# Patient Record
Sex: MALE | Race: WHITE | NOT HISPANIC OR LATINO | Employment: OTHER | URBAN - METROPOLITAN AREA
[De-identification: names, ages, dates, MRNs, and addresses within clinical notes are randomized per-mention and may not be internally consistent; named-entity substitution may affect disease eponyms.]

---

## 2017-11-21 ENCOUNTER — OFFICE VISIT (OUTPATIENT)
Dept: ORTHOPEDIC SURGERY | Facility: CLINIC | Age: 77
End: 2017-11-21

## 2017-11-21 VITALS — WEIGHT: 222 LBS | BODY MASS INDEX: 32.88 KG/M2 | HEIGHT: 69 IN | TEMPERATURE: 97.6 F

## 2017-11-21 DIAGNOSIS — M54.50 LUMBAR SPINE PAIN: Primary | ICD-10-CM

## 2017-11-21 DIAGNOSIS — M48.061 SPINAL STENOSIS OF LUMBAR REGION, UNSPECIFIED WHETHER NEUROGENIC CLAUDICATION PRESENT: ICD-10-CM

## 2017-11-21 DIAGNOSIS — M41.9 ACQUIRED SCOLIOSIS: ICD-10-CM

## 2017-11-21 PROCEDURE — 72100 X-RAY EXAM L-S SPINE 2/3 VWS: CPT | Performed by: ORTHOPAEDIC SURGERY

## 2017-11-21 PROCEDURE — 99204 OFFICE O/P NEW MOD 45 MIN: CPT | Performed by: ORTHOPAEDIC SURGERY

## 2017-11-21 RX ORDER — DUTASTERIDE 0.5 MG/1
0.5 CAPSULE, LIQUID FILLED ORAL DAILY
COMMUNITY
End: 2021-04-09

## 2017-11-21 RX ORDER — IBUPROFEN 200 MG
200 TABLET ORAL 2 TIMES DAILY
COMMUNITY
End: 2018-08-22

## 2017-11-21 RX ORDER — MULTIVIT-MIN/IRON FUM/FOLIC AC 7.5 MG-4
1 TABLET ORAL DAILY
COMMUNITY
End: 2020-09-22 | Stop reason: SDUPTHER

## 2017-11-21 RX ORDER — GABAPENTIN 300 MG/1
CAPSULE ORAL
COMMUNITY
Start: 2017-10-19 | End: 2019-08-06

## 2017-11-21 RX ORDER — LISINOPRIL 5 MG/1
5 TABLET ORAL
COMMUNITY
Start: 2017-02-16 | End: 2018-02-16

## 2017-11-21 RX ORDER — TAMSULOSIN HYDROCHLORIDE 0.4 MG/1
1 CAPSULE ORAL NIGHTLY
COMMUNITY
End: 2020-09-22 | Stop reason: SDUPTHER

## 2017-11-21 NOTE — PROGRESS NOTES
New patient or new problem visit    Chief Complaint   Patient presents with   • Lumbar Spine - Pain       HPI: He complains of chronic low back pain but more recent history of leg pain left more so than right which radiates to the feet.  The leg pain is worse than the back pain.  Over time the back pain is improved with the distraction therapy at the chiropractors, chiropractic itself home stretching devices a of various sorts and the most recent regimen of Neurontin and Aleve which have helped for the past 5 days to relieve his moderate intermittent but constantly present the pain which is burning in nature.    PFSH: See chart- reviewed    Review of Systems   Constitutional: Negative for chills, fever and unexpected weight change.   HENT: Positive for hearing loss and tinnitus. Negative for trouble swallowing and voice change.    Eyes: Negative for visual disturbance.   Respiratory: Negative for cough and shortness of breath.    Cardiovascular: Negative for chest pain and leg swelling.   Gastrointestinal: Negative for abdominal pain, nausea and vomiting.   Endocrine: Negative for cold intolerance and heat intolerance.   Genitourinary: Negative for difficulty urinating, frequency and urgency.   Skin: Negative for rash and wound.   Allergic/Immunologic: Negative for immunocompromised state.   Neurological: Positive for numbness (anita feet, left worse). Negative for weakness.   Hematological: Does not bruise/bleed easily.   Psychiatric/Behavioral: Negative for dysphoric mood. The patient is not nervous/anxious.        PE: Constitutional: Vital signs above-noted.  Awake, alert and oriented    Psychiatric: Affect and insight do not appear grossly disturbed.    Pulmonary: Breathing is unlabored, color is good.    Skin: Warm, dry and normal turgor    Cardiac:  pedal pulses intact.  No edema.    Eyesight and hearing appear adequate for examination purposes      Musculoskeletal:  There is some tenderness to percussion and  palpation of the spine. Motion appears undisturbed.  Posture is unremarkable to coronal and sagittal inspection.    The skin about the area is intact.  There is no palpable or visible deformity.  There is no local spasm.       Neurologic:   Reflexes are 2+ and symmetrical in the patellae and achilles.   Motor function is undisturbed in quadriceps, EHL, and gastrocnemius      Sensation appears symmetrically intact to light touch though he notes some numbness in the lateral aspect of the left leg.  In the bilateral lower extremities there is no evidence of atrophy.   Clonus is absent..  Gait appears undisturbed.  SLR test negative      MEDICAL DECISION MAKING    XRAY: Plain film x-rays of the lumbar spine show scoliosis and multilevel disc degeneration and some loss of lordosis.  No comparison views are available.    Other: n/a    Impression: Worsening back and leg pain which I believe is lumbar spinal stenosis, scoliosis and disc degeneration    Plan: I plan MRI scan with an eye toward lumbar epidural steroid injections.  The risk of the injections were explained.

## 2017-12-05 ENCOUNTER — HOSPITAL ENCOUNTER (OUTPATIENT)
Dept: MRI IMAGING | Facility: HOSPITAL | Age: 77
Discharge: HOME | End: 2017-12-05
Attending: ORTHOPAEDIC SURGERY | Admitting: ORTHOPAEDIC SURGERY

## 2017-12-05 DIAGNOSIS — M48.061 SPINAL STENOSIS OF LUMBAR REGION, UNSPECIFIED WHETHER NEUROGENIC CLAUDICATION PRESENT: ICD-10-CM

## 2017-12-05 DIAGNOSIS — M54.50 LUMBAR SPINE PAIN: ICD-10-CM

## 2017-12-05 PROCEDURE — 72148 MRI LUMBAR SPINE W/O DYE: CPT

## 2017-12-08 ENCOUNTER — TELEPHONE (OUTPATIENT)
Dept: ORTHOPEDIC SURGERY | Facility: CLINIC | Age: 77
End: 2017-12-08

## 2017-12-08 NOTE — TELEPHONE ENCOUNTER
----- Message from Alex Little MD sent at 12/7/2017  5:07 PM EST -----      LEFT MESSAGE FOR PT TO CALL BACK FOR MRI RESULTS MG    Tell him that the lumbar MRI shows moderate nerve compression, and that he is an excellent candidate for lumbar epidural steroid injections.  Go ahead and schedule these.

## 2017-12-08 NOTE — TELEPHONE ENCOUNTER
Patient has been informed of results and would like to proceed with injections. Please place order.cld

## 2017-12-11 DIAGNOSIS — M54.50 LUMBAR SPINE PAIN: Primary | ICD-10-CM

## 2017-12-26 ENCOUNTER — HOSPITAL ENCOUNTER (OUTPATIENT)
Dept: PAIN MEDICINE | Facility: HOSPITAL | Age: 77
Discharge: HOME | End: 2017-12-26
Attending: ORTHOPAEDIC SURGERY | Admitting: ORTHOPAEDIC SURGERY

## 2017-12-26 ENCOUNTER — ANESTHESIA (OUTPATIENT)
Dept: PAIN MEDICINE | Facility: HOSPITAL | Age: 77
End: 2017-12-26

## 2017-12-26 ENCOUNTER — TRANSCRIBE ORDERS (OUTPATIENT)
Dept: PAIN MEDICINE | Facility: HOSPITAL | Age: 77
End: 2017-12-26

## 2017-12-26 ENCOUNTER — HOSPITAL ENCOUNTER (OUTPATIENT)
Dept: GENERAL RADIOLOGY | Facility: HOSPITAL | Age: 77
Discharge: HOME | End: 2017-12-26

## 2017-12-26 ENCOUNTER — ANESTHESIA EVENT (OUTPATIENT)
Dept: PAIN MEDICINE | Facility: HOSPITAL | Age: 77
End: 2017-12-26

## 2017-12-26 VITALS
HEIGHT: 70 IN | OXYGEN SATURATION: 96 % | TEMPERATURE: 98.1 F | DIASTOLIC BLOOD PRESSURE: 77 MMHG | BODY MASS INDEX: 29.92 KG/M2 | SYSTOLIC BLOOD PRESSURE: 120 MMHG | RESPIRATION RATE: 16 BRPM | HEART RATE: 63 BPM | WEIGHT: 209 LBS

## 2017-12-26 DIAGNOSIS — M54.50 LUMBAR SPINE PAIN: Primary | ICD-10-CM

## 2017-12-26 DIAGNOSIS — M54.50 LUMBAR SPINE PAIN: ICD-10-CM

## 2017-12-26 DIAGNOSIS — R52 PAIN: ICD-10-CM

## 2017-12-26 PROBLEM — Z12.11 SCREEN FOR COLON CANCER: Status: ACTIVE | Noted: 2017-11-06

## 2017-12-26 PROBLEM — G62.9 NEUROPATHY: Status: ACTIVE | Noted: 2017-04-10

## 2017-12-26 PROBLEM — I10 HYPERTENSION: Status: ACTIVE | Noted: 2017-10-09

## 2017-12-26 PROBLEM — K21.9 GERD (GASTROESOPHAGEAL REFLUX DISEASE): Status: ACTIVE | Noted: 2017-12-26

## 2017-12-26 PROBLEM — I45.10 RIGHT BUNDLE BRANCH BLOCK (RBBB): Status: ACTIVE | Noted: 2017-12-26

## 2017-12-26 PROBLEM — N40.0 BPH (BENIGN PROSTATIC HYPERPLASIA): Status: ACTIVE | Noted: 2017-02-16

## 2017-12-26 PROCEDURE — 77003 FLUOROGUIDE FOR SPINE INJECT: CPT

## 2017-12-26 PROCEDURE — C1755 CATHETER, INTRASPINAL: HCPCS

## 2017-12-26 PROCEDURE — 25010000002 METHYLPREDNISOLONE PER 80 MG: Performed by: ANESTHESIOLOGY

## 2017-12-26 PROCEDURE — 0 IOPAMIDOL 41 % SOLUTION: Performed by: ANESTHESIOLOGY

## 2017-12-26 RX ORDER — OMEPRAZOLE 20 MG/1
20 CAPSULE, DELAYED RELEASE ORAL DAILY
COMMUNITY
End: 2021-04-09

## 2017-12-26 RX ORDER — LIDOCAINE HYDROCHLORIDE 10 MG/ML
1 INJECTION, SOLUTION INFILTRATION; PERINEURAL ONCE AS NEEDED
Status: DISCONTINUED | OUTPATIENT
Start: 2017-12-26 | End: 2017-12-27 | Stop reason: HOSPADM

## 2017-12-26 RX ORDER — MIDAZOLAM HYDROCHLORIDE 1 MG/ML
1 INJECTION INTRAMUSCULAR; INTRAVENOUS AS NEEDED
Status: DISCONTINUED | OUTPATIENT
Start: 2017-12-26 | End: 2017-12-27 | Stop reason: HOSPADM

## 2017-12-26 RX ORDER — SODIUM CHLORIDE 0.9 % (FLUSH) 0.9 %
1-10 SYRINGE (ML) INJECTION AS NEEDED
Status: DISCONTINUED | OUTPATIENT
Start: 2017-12-26 | End: 2017-12-27 | Stop reason: HOSPADM

## 2017-12-26 RX ORDER — FENTANYL CITRATE 50 UG/ML
50 INJECTION, SOLUTION INTRAMUSCULAR; INTRAVENOUS AS NEEDED
Status: DISCONTINUED | OUTPATIENT
Start: 2017-12-26 | End: 2017-12-27 | Stop reason: HOSPADM

## 2017-12-26 RX ORDER — NAPROXEN SODIUM 220 MG
220 TABLET ORAL 2 TIMES DAILY PRN
COMMUNITY
End: 2019-08-06

## 2017-12-26 RX ORDER — METHYLPREDNISOLONE ACETATE 80 MG/ML
80 INJECTION, SUSPENSION INTRA-ARTICULAR; INTRALESIONAL; INTRAMUSCULAR; SOFT TISSUE ONCE
Status: COMPLETED | OUTPATIENT
Start: 2017-12-26 | End: 2017-12-26

## 2017-12-26 RX ADMIN — IOPAMIDOL 10 ML: 408 INJECTION, SOLUTION INTRATHECAL at 09:21

## 2017-12-26 RX ADMIN — METHYLPREDNISOLONE ACETATE 80 MG: 80 INJECTION, SUSPENSION INTRA-ARTICULAR; INTRALESIONAL; INTRAMUSCULAR; SOFT TISSUE at 09:21

## 2017-12-26 NOTE — H&P
"[facility]    History and Physical    Patient Name: CHRIS Mccoy Jr.  :  1940  MRN:  [id number]  Date of Admission: 2017    Subjective     Patient is a 77 y.o. male presents with chief complaint of chronic, moderate, severe leg: bilateral pain, numbness, and tingling.  Onset of symptoms was gradual starting over 5 years ago.  Symptoms are associated/aggravated by activity or exercise. Symptoms improve with pain medication    The following portions of the patients history were reviewed and updated as appropriate: current medications, allergies, past medical history, past surgical history, past family history, past social history and problem list                Objective     Past Medical History:   Past Medical History:   Diagnosis Date   • GERD (gastroesophageal reflux disease)    • Hypertension    • Low back pain      Past Surgical History:   Past Surgical History:   Procedure Laterality Date   • TONSILLECTOMY      age 7     Family History:   Family History   Problem Relation Age of Onset   • Hypertension Father    • Diabetes Sister      Social History:   Social History   Substance Use Topics   • Smoking status: Never Smoker   • Smokeless tobacco: Never Used   • Alcohol use Yes      Comment: 4-5 drinks per week       Vital Signs Range for the last 24 hours  Temperature: Temp:  [36.7 °C (98.1 °F)] 36.7 °C (98.1 °F)   Temp Source: Temp src: Oral   BP: BP: (137)/(95) 137/95   Pulse: Heart Rate:  [65] 65   Respirations: Resp:  [16] 16   SPO2: SpO2:  [98 %] 98 %   O2 Amount (l/min):     O2 Devices O2 Device: room air   Weight: Weight:  [94.8 kg (209 lb)] 94.8 kg (209 lb)     Flowsheet Rows         First Filed Value    Admission Height  177.8 cm (70\") Documented at 2017 0857    Admission Weight  94.8 kg (209 lb) Documented at 2017 0857          --------------------------------------------------------------------------------    Current Outpatient Prescriptions   Medication Sig Dispense " Refill   • dutasteride (AVODART) 0.5 MG capsule Take  by mouth.     • gabapentin (NEURONTIN) 300 MG capsule TAKE ONE CAPSULE BY MOUTH TWICE A DAY     • lisinopril (PRINIVIL,ZESTRIL) 5 MG tablet Take 5 mg by mouth.     • Multiple Vitamins-Minerals (MULTIVITAMIN WITH MINERALS) tablet Take 1 tablet by mouth.     • naproxen sodium (ALEVE) 220 MG tablet Take 220 mg by mouth 2 (Two) Times a Day As Needed.     • omeprazole (priLOSEC) 20 MG capsule Take 20 mg by mouth Daily.     • tamsulosin (FLOMAX) 0.4 MG capsule 24 hr capsule Take 0.4 mg by mouth.     • ibuprofen (ADVIL,MOTRIN) 200 MG tablet Take 200 mg by mouth 2 (Two) Times a Day.       Current Facility-Administered Medications   Medication Dose Route Frequency Provider Last Rate Last Dose   • fentaNYL citrate (PF) (SUBLIMAZE) injection 50 mcg  50 mcg Intravenous PRN Damien Covarrubias MD       • iopamidol (ISOVUE-M 200) injection 41%  12 mL Epidural Once PRN Damien Covarrubias MD       • lidocaine (XYLOCAINE) 1 % injection 1 mL  1 mL Intradermal Once PRN Damien Covarrubias MD       • methylPREDNISolone acetate (DEPO-medrol) injection 80 mg  80 mg Intra-articular Once Damien Covarrubias MD       • midazolam (VERSED) injection 1 mg  1 mg Intravenous PRN Damien Covarrubias MD       • sodium chloride 0.9 % flush 1-10 mL  1-10 mL Intravenous PRN Damien Covarrubias MD           --------------------------------------------------------------------------------  Assessment/Plan      Anesthesia Evaluation     Patient summary reviewed and Nursing notes reviewed   no history of anesthetic complications:         Airway   Mallampati: II  TM distance: >3 FB  Neck ROM: full  no difficulty expected  Dental - normal exam     Pulmonary - negative pulmonary ROS and normal exam   (-) shortness of breath, sleep apnea, rhonchi, decreased breath sounds  Cardiovascular - normal exam  Exercise tolerance: good (4-7 METS)    Rhythm: regular  Rate: normal    (+) hypertension,  dysrhythmias (RBBB),   (-) past MI, angina, CHF, orthopnea, PND, LUND, PVD      Neuro/Psych  (+) numbness (LLE>RLE),   Strength normal in all four extremities,   left straight leg raise test,  Sensory Deficit,    normal reflexes and symmetric  (-) seizures, neuromuscular disease, TIA, CVA, dizziness/light headedness, weakness, normal coordination, normal gait, right straight leg raise test  GI/Hepatic/Renal/Endo    (+)  GERD,   (-) liver disease, diabetes    Musculoskeletal (-) normal exam    (+) back pain, radiculopathy Left lower extremity and Right lower extremity  (-)  DEAN test  Abdominal  - normal exam   Substance History - negative use  (-) alcohol use, drug use     OB/GYN negative ob/gyn ROS         Other - negative ROS                                          Diagnosis and Plan    Treatment Plan  ASA 3      Procedures: Lumbar Epidural Steroid Injection(LESI), With fluoroscopy,       Anesthetic plan and risks discussed with patient.        MRI - reviewed    Diagnosis     * Lumbar degenerative disc disease [M51.36]     * Lumbar radiculopathy [M54.16]     PLAN:  1.  Lumbar epidural steroid injections, up to 3, spaced 1-2 weeks apart.  If pain control is acceptable after 1 or 2 injections, it was discussed with the patient that they may return for the subsequent injections if and when their pain returns.  The risks were discussed with the patient including failure of relief, worsening pain, Headache (post dural puncture headache), bleeding (epidural hematoma) and infection (epidural abscess or skin infection).  2.  Physical therapy exercises at home as prescribed by physical therapy or from the pain clinic handout (given to the patient).  Continuation of these exercises every day, or multiple times per week, even when the patient has good pain relief, was stressed to the patient as a preventative measure to decrease the frequency and severity of future pain episodes.  3.  Continue pain medicines as already  prescribed.  If patient not currently taking any, it is recommended to begin Acetaminophen 1000 mg po q 8 hours.  If other medicines containing Acetaminophen are currently prescribed, maintain daily dose at 3000 mg.    4.  If they can tolerate NSAIDS, it is recommended to take Ibuprofen 600 mg po q 6 hours for 7 days during pain exacerbations.  Alternatively, they may substitute an NSAID of their choice (e.g. Aleve).  This may be taken at the same time as Acetaminophen.  5.  Heat and ice to the affected area as tolerated for pain control.  It was discussed that heating pads can cause burns.  6.  Daily low impact exercise such as walking or water exercise was recommended to maintain overall health and aid in weight control.   7.  Follow up as needed for subsequent injections.  8.  Patient was counseled to abstain from tobacco products.

## 2017-12-26 NOTE — PLAN OF CARE
Problem: Pain, Chronic (Adult)  Goal: Acceptable Pain Control/Comfort Level  Outcome: Ongoing (interventions implemented as appropriate)

## 2017-12-26 NOTE — PLAN OF CARE
Problem: Pain, Chronic (Adult)  Goal: Identify Related Risk Factors and Signs and Symptoms  Outcome: Ongoing (interventions implemented as appropriate)

## 2017-12-26 NOTE — ANESTHESIA PROCEDURE NOTES
PAIN Epidural block    Patient location during procedure: pain clinic  Indication:procedure for pain  Performed By  Anesthesiologist: BOAZ PEREZ  Preanesthetic Checklist  Completed: patient identified, surgical consent, pre-op evaluation, timeout performed, IV checked, risks and benefits discussed and monitors and equipment checked  Additional Notes  Diagnosis:  Post-Op Diagnosis Codes:     * Lumbar degenerative disc disease (M51.36)     * Lumbar radiculopathy (M54.16)      Prep:  Pt Position:prone  Sterile Tech:gloves, mask and sterile barrier  Prep:chlorhexidine gluconate and isopropyl alcohol  Monitoring:blood pressure monitoring, continuous pulse oximetry and EKG  Procedure:  Sedation: no   Approach:left paramedian  Guidance: fluoroscopy  Location:lumbar  Interspace: L5-S1.  Needle Type:Tuohy  Needle Gauge:20  Aspiration:negative  Test Dose:negative  Medications:  Depomedrol:80 mg  Preservative Free Saline:3mL  Isovue:3mL    Post Assessment:  Dressing:occlusive dressing applied  Pt Tolerance:patient tolerated the procedure well with no apparent complications  Complications:no

## 2018-01-23 ENCOUNTER — HOSPITAL ENCOUNTER (OUTPATIENT)
Dept: PAIN MEDICINE | Facility: HOSPITAL | Age: 78
Discharge: HOME | End: 2018-01-23
Admitting: ORTHOPAEDIC SURGERY

## 2018-01-23 ENCOUNTER — TRANSCRIBE ORDERS (OUTPATIENT)
Dept: PAIN MEDICINE | Facility: HOSPITAL | Age: 78
End: 2018-01-23

## 2018-01-23 ENCOUNTER — ANESTHESIA EVENT (OUTPATIENT)
Dept: PAIN MEDICINE | Facility: HOSPITAL | Age: 78
End: 2018-01-23

## 2018-01-23 ENCOUNTER — HOSPITAL ENCOUNTER (OUTPATIENT)
Dept: GENERAL RADIOLOGY | Facility: HOSPITAL | Age: 78
Discharge: HOME | End: 2018-01-23

## 2018-01-23 ENCOUNTER — ANESTHESIA (OUTPATIENT)
Dept: PAIN MEDICINE | Facility: HOSPITAL | Age: 78
End: 2018-01-23

## 2018-01-23 VITALS
TEMPERATURE: 98.1 F | DIASTOLIC BLOOD PRESSURE: 83 MMHG | WEIGHT: 207 LBS | RESPIRATION RATE: 16 BRPM | BODY MASS INDEX: 29.63 KG/M2 | HEIGHT: 70 IN | HEART RATE: 68 BPM | SYSTOLIC BLOOD PRESSURE: 126 MMHG | OXYGEN SATURATION: 97 %

## 2018-01-23 DIAGNOSIS — M54.50 LUMBAR SPINE PAIN: Primary | ICD-10-CM

## 2018-01-23 DIAGNOSIS — R52 PAIN: ICD-10-CM

## 2018-01-23 DIAGNOSIS — M54.50 LUMBAR SPINE PAIN: ICD-10-CM

## 2018-01-23 PROCEDURE — 0 IOPAMIDOL 41 % SOLUTION: Performed by: ANESTHESIOLOGY

## 2018-01-23 PROCEDURE — 77003 FLUOROGUIDE FOR SPINE INJECT: CPT

## 2018-01-23 PROCEDURE — 25010000002 METHYLPREDNISOLONE PER 80 MG: Performed by: ANESTHESIOLOGY

## 2018-01-23 PROCEDURE — C1755 CATHETER, INTRASPINAL: HCPCS

## 2018-01-23 RX ORDER — METHYLPREDNISOLONE ACETATE 80 MG/ML
80 INJECTION, SUSPENSION INTRA-ARTICULAR; INTRALESIONAL; INTRAMUSCULAR; SOFT TISSUE ONCE
Status: COMPLETED | OUTPATIENT
Start: 2018-01-23 | End: 2018-01-23

## 2018-01-23 RX ADMIN — IOPAMIDOL 3 ML: 408 INJECTION, SOLUTION INTRATHECAL at 08:55

## 2018-01-23 RX ADMIN — METHYLPREDNISOLONE ACETATE 80 MG: 80 INJECTION, SUSPENSION INTRA-ARTICULAR; INTRALESIONAL; INTRAMUSCULAR; SOFT TISSUE at 08:55

## 2018-01-23 NOTE — INTERVAL H&P NOTE
[facility]  H&P reviewed. No changes since last visit.  Patient states   25-50% improvement since the last procedure/injection.    Diagnosis     * Degeneration of lumbar intervertebral disc [M51.36]     * Lumbar radiculopathy [M54.16]      Airway assessed since last visit. Airway class equals: 2.  He is being treated primarily for left leg pain.  His pain level today as a 4/10.  He is here for lumbar epidural steroid injection #2.

## 2018-01-23 NOTE — H&P (VIEW-ONLY)
"[facility]    History and Physical    Patient Name: CHRIS Mccoy Jr.  :  1940  MRN:  [id number]  Date of Admission: 2017    Subjective     Patient is a 77 y.o. male presents with chief complaint of chronic, moderate, severe leg: bilateral pain, numbness, and tingling.  Onset of symptoms was gradual starting over 5 years ago.  Symptoms are associated/aggravated by activity or exercise. Symptoms improve with pain medication    The following portions of the patients history were reviewed and updated as appropriate: current medications, allergies, past medical history, past surgical history, past family history, past social history and problem list                Objective     Past Medical History:   Past Medical History:   Diagnosis Date   • GERD (gastroesophageal reflux disease)    • Hypertension    • Low back pain      Past Surgical History:   Past Surgical History:   Procedure Laterality Date   • TONSILLECTOMY      age 7     Family History:   Family History   Problem Relation Age of Onset   • Hypertension Father    • Diabetes Sister      Social History:   Social History   Substance Use Topics   • Smoking status: Never Smoker   • Smokeless tobacco: Never Used   • Alcohol use Yes      Comment: 4-5 drinks per week       Vital Signs Range for the last 24 hours  Temperature: Temp:  [36.7 °C (98.1 °F)] 36.7 °C (98.1 °F)   Temp Source: Temp src: Oral   BP: BP: (137)/(95) 137/95   Pulse: Heart Rate:  [65] 65   Respirations: Resp:  [16] 16   SPO2: SpO2:  [98 %] 98 %   O2 Amount (l/min):     O2 Devices O2 Device: room air   Weight: Weight:  [94.8 kg (209 lb)] 94.8 kg (209 lb)     Flowsheet Rows         First Filed Value    Admission Height  177.8 cm (70\") Documented at 2017 0857    Admission Weight  94.8 kg (209 lb) Documented at 2017 0857          --------------------------------------------------------------------------------    Current Outpatient Prescriptions   Medication Sig Dispense " Refill   • dutasteride (AVODART) 0.5 MG capsule Take  by mouth.     • gabapentin (NEURONTIN) 300 MG capsule TAKE ONE CAPSULE BY MOUTH TWICE A DAY     • lisinopril (PRINIVIL,ZESTRIL) 5 MG tablet Take 5 mg by mouth.     • Multiple Vitamins-Minerals (MULTIVITAMIN WITH MINERALS) tablet Take 1 tablet by mouth.     • naproxen sodium (ALEVE) 220 MG tablet Take 220 mg by mouth 2 (Two) Times a Day As Needed.     • omeprazole (priLOSEC) 20 MG capsule Take 20 mg by mouth Daily.     • tamsulosin (FLOMAX) 0.4 MG capsule 24 hr capsule Take 0.4 mg by mouth.     • ibuprofen (ADVIL,MOTRIN) 200 MG tablet Take 200 mg by mouth 2 (Two) Times a Day.       Current Facility-Administered Medications   Medication Dose Route Frequency Provider Last Rate Last Dose   • fentaNYL citrate (PF) (SUBLIMAZE) injection 50 mcg  50 mcg Intravenous PRN Damien Covarrubias MD       • iopamidol (ISOVUE-M 200) injection 41%  12 mL Epidural Once PRN Damien Covarrubias MD       • lidocaine (XYLOCAINE) 1 % injection 1 mL  1 mL Intradermal Once PRN Damien Covarrubias MD       • methylPREDNISolone acetate (DEPO-medrol) injection 80 mg  80 mg Intra-articular Once Damien Covarrubias MD       • midazolam (VERSED) injection 1 mg  1 mg Intravenous PRN Damien Covarrubias MD       • sodium chloride 0.9 % flush 1-10 mL  1-10 mL Intravenous PRN Damien Covarrubias MD           --------------------------------------------------------------------------------  Assessment/Plan      Anesthesia Evaluation     Patient summary reviewed and Nursing notes reviewed   no history of anesthetic complications:         Airway   Mallampati: II  TM distance: >3 FB  Neck ROM: full  no difficulty expected  Dental - normal exam     Pulmonary - negative pulmonary ROS and normal exam   (-) shortness of breath, sleep apnea, rhonchi, decreased breath sounds  Cardiovascular - normal exam  Exercise tolerance: good (4-7 METS)    Rhythm: regular  Rate: normal    (+) hypertension,  dysrhythmias (RBBB),   (-) past MI, angina, CHF, orthopnea, PND, LUND, PVD      Neuro/Psych  (+) numbness (LLE>RLE),   Strength normal in all four extremities,   left straight leg raise test,  Sensory Deficit,    normal reflexes and symmetric  (-) seizures, neuromuscular disease, TIA, CVA, dizziness/light headedness, weakness, normal coordination, normal gait, right straight leg raise test  GI/Hepatic/Renal/Endo    (+)  GERD,   (-) liver disease, diabetes    Musculoskeletal (-) normal exam    (+) back pain, radiculopathy Left lower extremity and Right lower extremity  (-)  DEAN test  Abdominal  - normal exam   Substance History - negative use  (-) alcohol use, drug use     OB/GYN negative ob/gyn ROS         Other - negative ROS                                          Diagnosis and Plan    Treatment Plan  ASA 3      Procedures: Lumbar Epidural Steroid Injection(LESI), With fluoroscopy,       Anesthetic plan and risks discussed with patient.        MRI - reviewed    Diagnosis     * Lumbar degenerative disc disease [M51.36]     * Lumbar radiculopathy [M54.16]     PLAN:  1.  Lumbar epidural steroid injections, up to 3, spaced 1-2 weeks apart.  If pain control is acceptable after 1 or 2 injections, it was discussed with the patient that they may return for the subsequent injections if and when their pain returns.  The risks were discussed with the patient including failure of relief, worsening pain, Headache (post dural puncture headache), bleeding (epidural hematoma) and infection (epidural abscess or skin infection).  2.  Physical therapy exercises at home as prescribed by physical therapy or from the pain clinic handout (given to the patient).  Continuation of these exercises every day, or multiple times per week, even when the patient has good pain relief, was stressed to the patient as a preventative measure to decrease the frequency and severity of future pain episodes.  3.  Continue pain medicines as already  prescribed.  If patient not currently taking any, it is recommended to begin Acetaminophen 1000 mg po q 8 hours.  If other medicines containing Acetaminophen are currently prescribed, maintain daily dose at 3000 mg.    4.  If they can tolerate NSAIDS, it is recommended to take Ibuprofen 600 mg po q 6 hours for 7 days during pain exacerbations.  Alternatively, they may substitute an NSAID of their choice (e.g. Aleve).  This may be taken at the same time as Acetaminophen.  5.  Heat and ice to the affected area as tolerated for pain control.  It was discussed that heating pads can cause burns.  6.  Daily low impact exercise such as walking or water exercise was recommended to maintain overall health and aid in weight control.   7.  Follow up as needed for subsequent injections.  8.  Patient was counseled to abstain from tobacco products.

## 2018-01-23 NOTE — ANESTHESIA PROCEDURE NOTES
PAIN Epidural block    Patient location during procedure: pain clinic  Start Time: 1/23/2018 8:46 AM  Stop Time: 1/23/2018 8:58 AM  Indication:procedure for pain  Performed By  Anesthesiologist: NICOLE TADEO  Preanesthetic Checklist  Completed: patient identified, site marked, surgical consent, pre-op evaluation, timeout performed, risks and benefits discussed and monitors and equipment checked  Additional Notes  Interlaminar epidural was performed under fluoroscopic guidance.    Diagnosis     * Degeneration of lumbar intervertebral disc (M51.36)     * Lumbar radiculopathy (M54.16)  Prep:  Pt Position:prone  Sterile Tech:cap, gloves, mask and sterile barrier  Prep:chlorhexidine gluconate and isopropyl alcohol  Monitoring:blood pressure monitoring, continuous pulse oximetry and EKG  Procedure:  Sedation: no   Approach:left paramedian  Guidance: fluoroscopy  Location:lumbar  Interspace: L5-S1.  Needle Type:Tuohy  Needle Gauge:20 G  Aspiration:negative  Medications:  Depomedrol:80 mg  Preservative Free Saline:3mL  Isovue:2mL  Comments:Epidural spread of contrast.  Post Assessment:  Dressing:occlusive dressing applied  Pt Tolerance:patient tolerated the procedure well with no apparent complications  Complications:no

## 2018-03-13 ENCOUNTER — HOSPITAL ENCOUNTER (OUTPATIENT)
Dept: GENERAL RADIOLOGY | Facility: HOSPITAL | Age: 78
Discharge: HOME | End: 2018-03-13

## 2018-03-13 ENCOUNTER — ANESTHESIA EVENT (OUTPATIENT)
Dept: PAIN MEDICINE | Facility: HOSPITAL | Age: 78
End: 2018-03-13

## 2018-03-13 ENCOUNTER — ANESTHESIA (OUTPATIENT)
Dept: PAIN MEDICINE | Facility: HOSPITAL | Age: 78
End: 2018-03-13

## 2018-03-13 ENCOUNTER — HOSPITAL ENCOUNTER (OUTPATIENT)
Dept: PAIN MEDICINE | Facility: HOSPITAL | Age: 78
Discharge: HOME | End: 2018-03-13
Admitting: ORTHOPAEDIC SURGERY

## 2018-03-13 VITALS
SYSTOLIC BLOOD PRESSURE: 118 MMHG | DIASTOLIC BLOOD PRESSURE: 75 MMHG | RESPIRATION RATE: 16 BRPM | OXYGEN SATURATION: 96 % | HEART RATE: 72 BPM | TEMPERATURE: 97.5 F

## 2018-03-13 DIAGNOSIS — R52 PAIN: ICD-10-CM

## 2018-03-13 DIAGNOSIS — M54.50 LUMBAR SPINE PAIN: ICD-10-CM

## 2018-03-13 PROCEDURE — C1755 CATHETER, INTRASPINAL: HCPCS

## 2018-03-13 PROCEDURE — 25010000002 METHYLPREDNISOLONE PER 80 MG: Performed by: ANESTHESIOLOGY

## 2018-03-13 PROCEDURE — 0 IOPAMIDOL 41 % SOLUTION: Performed by: ANESTHESIOLOGY

## 2018-03-13 PROCEDURE — 77003 FLUOROGUIDE FOR SPINE INJECT: CPT

## 2018-03-13 RX ORDER — LISINOPRIL 5 MG/1
5 TABLET ORAL
COMMUNITY
Start: 2018-01-08 | End: 2019-01-08

## 2018-03-13 RX ORDER — METHYLPREDNISOLONE ACETATE 80 MG/ML
80 INJECTION, SUSPENSION INTRA-ARTICULAR; INTRALESIONAL; INTRAMUSCULAR; SOFT TISSUE ONCE
Status: COMPLETED | OUTPATIENT
Start: 2018-03-13 | End: 2018-03-13

## 2018-03-13 RX ADMIN — METHYLPREDNISOLONE ACETATE 80 MG: 80 INJECTION, SUSPENSION INTRA-ARTICULAR; INTRALESIONAL; INTRAMUSCULAR; SOFT TISSUE at 09:39

## 2018-03-13 RX ADMIN — IOPAMIDOL 10 ML: 408 INJECTION, SOLUTION INTRATHECAL at 09:39

## 2018-03-13 NOTE — ANESTHESIA PROCEDURE NOTES
PAIN Epidural block    Patient location during procedure: pain clinic  Start Time: 3/13/2018 9:27 AM  Stop Time: 3/13/2018 9:41 AM  Indication:procedure for pain  Performed By  Anesthesiologist: NICOLE TADEO  Preanesthetic Checklist  Completed: patient identified, site marked, surgical consent, pre-op evaluation, timeout performed, risks and benefits discussed and monitors and equipment checked  Additional Notes  Interlaminar epidural was performed under fluoroscopic guidance.    Diagnosis     * Degeneration of lumbar intervertebral disc (M51.36)     * Lumbar radiculopathy (M54.16)  Prep:  Pt Position:prone  Sterile Tech:cap, gloves, mask and sterile barrier  Prep:chlorhexidine gluconate and isopropyl alcohol  Monitoring:blood pressure monitoring, continuous pulse oximetry and EKG  Procedure:  Sedation: no   Approach:left paramedian  Guidance: fluoroscopy  Location:lumbar  Interspace: L5-S1.  Needle Type:Tuohy  Needle Gauge:20 G  Aspiration:negative  Medications:  Depomedrol:80 mg  Preservative Free Saline:3mL  Isovue:2mL  Comments:Epidural spread of contrast.  Post Assessment:  Dressing:occlusive dressing applied  Pt Tolerance:patient tolerated the procedure well with no apparent complications  Complications:no

## 2018-03-13 NOTE — H&P
[facility]    History and Physical    Patient Name: CHRIS Mccoy Jr.  :  1940  MRN:  [id number]  Date of Admission: 3/13/2018    Subjective     The patient is a 77-year-old male with pain in his lower back which radiates to his left greater than right lower extremity.  He reports approximate 75% relief following his last lumbar epidural steroid injection.  His pain level today is a 4/10.  He is here for lumbar epidural steroid injection #3.    The following portions of the patients history were reviewed and updated as appropriate: current medications, allergies, past medical history, past surgical history, past family history, past social history and problem list                Objective     Past Medical History:   Past Medical History:   Diagnosis Date   • GERD (gastroesophageal reflux disease)    • Hypertension    • Low back pain      Past Surgical History:   Past Surgical History:   Procedure Laterality Date   • TONSILLECTOMY      age 7     Family History:   Family History   Problem Relation Age of Onset   • Hypertension Father    • Diabetes Sister      Social History:   Social History   Substance Use Topics   • Smoking status: Never Smoker   • Smokeless tobacco: Never Used   • Alcohol use Yes      Comment: 4-5 drinks per week       Vital Signs Range for the last 24 hours  Temperature: Temp:  [36.4 °C (97.5 °F)] 36.4 °C (97.5 °F)   Temp Source: Temp src: Oral   BP: BP: (130)/(87) 130/87   Pulse: Heart Rate:  [76] 76   Respirations: Resp:  [16] 16   SPO2: SpO2:  [95 %] 95 %   O2 Amount (l/min):     O2 Devices Device (Oxygen Therapy): room air   Weight:           --------------------------------------------------------------------------------    Current Outpatient Prescriptions   Medication Sig Dispense Refill   • gabapentin (NEURONTIN) 300 MG capsule TAKE ONE CAPSULE BY MOUTH TWICE A DAY     • lisinopril (PRINIVIL,ZESTRIL) 5 MG tablet Take 5 mg by mouth.     • Multiple Vitamins-Minerals  (MULTIVITAMIN WITH MINERALS) tablet Take 1 tablet by mouth.     • naproxen sodium (ALEVE) 220 MG tablet Take 220 mg by mouth 2 (Two) Times a Day As Needed.     • omeprazole (priLOSEC) 20 MG capsule Take 20 mg by mouth Daily.     • tamsulosin (FLOMAX) 0.4 MG capsule 24 hr capsule Take 0.4 mg by mouth.     • dutasteride (AVODART) 0.5 MG capsule Take  by mouth.     • ibuprofen (ADVIL,MOTRIN) 200 MG tablet Take 200 mg by mouth 2 (Two) Times a Day.       No current facility-administered medications for this encounter.        --------------------------------------------------------------------------------  Assessment/Plan      Anesthesia Evaluation     Patient summary reviewed and Nursing notes reviewed   NPO Solid Status: > 8 hours  NPO Liquid Status: < 2 hours           Airway   Mallampati: II  TM distance: >3 FB  Neck ROM: full  Dental - normal exam     Pulmonary - negative pulmonary ROS and normal exam    breath sounds clear to auscultation  Cardiovascular - normal exam    Rhythm: regular  Rate: normal    (+) hypertension, dysrhythmias,   (-) angina, orthopnea, PND, LUND      Neuro/Psych- negative ROS  GI/Hepatic/Renal/Endo    (+)  GERD,      Musculoskeletal (-) negative ROS    Abdominal  - normal exam    Abdomen: soft.  Bowel sounds: normal.   Substance History - negative use     OB/GYN negative ob/gyn ROS         Other - negative ROS                  Diagnosis and Plan    Treatment Plan  ASA 3   Patient has had previous injection/procedure with 50-75% improvement.   Procedures: Lumbar Epidural Steroid Injection(LESI), With fluoroscopy,       Anesthetic plan and risks discussed with patient.          Diagnosis     * Degeneration of lumbar intervertebral disc [M51.36]     * Lumbar radiculopathy [M54.16]

## 2018-04-12 ENCOUNTER — TRANSCRIBE ORDERS (OUTPATIENT)
Dept: ADMINISTRATIVE | Facility: HOSPITAL | Age: 78
End: 2018-04-12

## 2018-04-12 DIAGNOSIS — R09.89 GLOBUS SENSATION: Primary | ICD-10-CM

## 2018-04-17 ENCOUNTER — APPOINTMENT (OUTPATIENT)
Dept: GENERAL RADIOLOGY | Facility: HOSPITAL | Age: 78
End: 2018-04-17
Attending: OTOLARYNGOLOGY

## 2018-04-18 ENCOUNTER — HOSPITAL ENCOUNTER (OUTPATIENT)
Dept: GENERAL RADIOLOGY | Facility: HOSPITAL | Age: 78
Discharge: HOME | End: 2018-04-18
Attending: OTOLARYNGOLOGY | Admitting: OTOLARYNGOLOGY

## 2018-04-18 DIAGNOSIS — R09.89 GLOBUS SENSATION: ICD-10-CM

## 2018-04-18 PROCEDURE — A9270 NON-COVERED ITEM OR SERVICE: HCPCS | Performed by: OTOLARYNGOLOGY

## 2018-04-18 PROCEDURE — 92611 MOTION FLUOROSCOPY/SWALLOW: CPT

## 2018-04-18 PROCEDURE — 63710000001 BARIUM SULFATE 40 % SUSPENSION: Performed by: OTOLARYNGOLOGY

## 2018-04-18 PROCEDURE — G8996 SWALLOW CURRENT STATUS: HCPCS

## 2018-04-18 PROCEDURE — 74230 X-RAY XM SWLNG FUNCJ C+: CPT

## 2018-04-18 PROCEDURE — 63710000001 BARIUM SULFATE 98 % RECONSTITUTED SUSPENSION: Performed by: OTOLARYNGOLOGY

## 2018-04-18 PROCEDURE — G8997 SWALLOW GOAL STATUS: HCPCS

## 2018-04-18 PROCEDURE — 63710000001 BARIUM SULFATE 96 % RECONSTITUTED SUSPENSION: Performed by: OTOLARYNGOLOGY

## 2018-04-18 PROCEDURE — G8998 SWALLOW D/C STATUS: HCPCS

## 2018-04-18 RX ADMIN — BARIUM SULFATE 4 ML: 980 POWDER, FOR SUSPENSION ORAL at 13:37

## 2018-04-18 RX ADMIN — BARIUM SULFATE 65 ML: 960 POWDER, FOR SUSPENSION ORAL at 13:37

## 2018-04-18 RX ADMIN — BARIUM SULFATE 50 ML: 400 SUSPENSION ORAL at 13:38

## 2018-04-18 NOTE — MBS/VFSS/FEES
"Outpatient Speech Language Pathology   Adult Swallow Initial Evaluation  [facility]     Patient Name: CHRIS Mccoy Jr.  : 1940  MRN: [id number]  Today's Date: 2018         Visit Date: 2018   Patient Active Problem List   Diagnosis   • BPH (benign prostatic hyperplasia)   • Hypertension   • Neuropathy   • Screen for colon cancer   • Right bundle branch block (RBBB)   • GERD (gastroesophageal reflux disease)   • Low back pain        Past Medical History:   Diagnosis Date   • GERD (gastroesophageal reflux disease)    • Hypertension    • Low back pain         Past Surgical History:   Procedure Laterality Date   • TONSILLECTOMY      age 7         Visit Dx:     ICD-10-CM ICD-9-CM   1. Globus sensation F45.8 306.4                 SLP Adult Swallow Evaluation - 18 1300        Rehab Evaluation    Document Type evaluation  -    Subjective Information complains of   R side globus sensation, but \"much of it has subsided\"  -    Patient Observations alert;cooperative  -    Patient Effort excellent  -    Symptoms Noted During/After Treatment none  -       General Information    Patient Profile Reviewed yes  -    Pertinent History Of Current Problem Globus sensation following virus. Pt reported \"much of it has subsided\". Pt noted his neck muscles have been tight and the globus sensation has been decreasing with stretching.  -    Current Method of Nutrition regular textures;thin liquids;other (see comments)   Pt denies dysphagia  -    Precautions/Limitations, Hearing hearing impairment, bilaterally  -    Prior Level of Function-Communication WFL  -    Prior Level of Function-Swallowing no diet consistency restrictions  -    Plans/Goals Discussed with patient  -    Barriers to Rehab hearing deficit  -    Patient's Goals for Discharge patient did not state  -       Pain Assessment    Additional Documentation Pain Scale: Numbers Pre/Post-Treatment (Group)  -       Pain Scale: " Numbers Pre/Post-Treatment    Pain Scale: Numbers, Pretreatment 0/10 - no pain  -SH    Pain Scale: Numbers, Post-Treatment 0/10 - no pain  -SH       Oral Musculature and Cranial Nerve Assessment    Oral Motor General Assessment WFL  -       MBS/VFSS Interpretation    Oral Prep Phase WFL  -SH    Oral Transit Phase WFL  -    Oral Residue WFL  -    VFSS Summary Pt presents with functional oropharyngeal swallow with mild esophageal dysphagia. Prominent cricopharyngeus and possible forming diverticulum allowing incomplete clearance of material past upper esophageal sphincter with backflow to pyriforms. Anterior view revealed stasis bilaterally. Slow esophageal clearance with puree. No penetration noted with thins, nectar, puree, or regular. Adequate bolus preparation and manipulation. Trace residue present post swallow with nectar at valleculae, pyriforms, and upper esophageal sphincter. Mild residue post swallow with thins at valleculae, pyriforms, and upper esophageal sphincter requiring cues to partially clear. Mild-moderate stasis post swallow with puree at valleculae, pyriforms, posterior pharyngeal wall and upper esophageal sphincter. Pt required cues to partially clear. Spillage to pyriforms before the swallow with mixed with transient, shallow posterior penetration.   -SH       Initiation of Pharyngeal Swallow    Initiation of Pharyngeal Swallow WFL  -SH       Esophageal Phase    Esophageal Phase esophageal retention  -SH       SLP Communication to Radiology    Summary Statement Pt presents with functional oropharyngeal swallow with mild esophageal dysphagia. Prominent cricopharyngeus and possible forming diverticulum allowing incomplete clearance of material past upper esophageal sphincter with backflow to pyriforms. Anterior view revealed stasis bilaterally. Slow esophageal clearance with puree. No penetration noted with thins, nectar, puree, or regular. Adequate bolus preparation and manipulation. Trace  residue present post swallow with nectar at valleculae, pyriforms, and upper esophageal sphincter. Mild residue post swallow with thins at valleculae, pyriforms, and upper esophageal sphincter requiring cues to partially clear. Mild-moderate stasis post swallow with puree at valleculae, pyriforms, posterior pharyngeal wall and upper esophageal sphincter. Pt required cues to partially clear. Spillage to pyriforms before the swallow with mixed with transient, shallow posterior penetration.   -       Clinical Impression    SLP Swallowing Diagnosis functional oral phase;functional pharyngeal phase;mild;esophageal dysfunction  -    Functional Impact no impact on function  -    Criteria for Skilled Therapeutic Interventions Met no problems identified which require skilled intervention  -       Recommendations    Therapy Frequency (Swallow) evaluation only  -    SLP Diet Recommendation regular textures;thin liquids  -    Recommended Precautions and Strategies multiple swallows per bite of food;multiple swallows per sip of liquid;upright posture during/after eating  -    SLP Rec. for Method of Medication Administration meds whole;with thin liquids;with pudding or applesauce  -    Monitor for Signs of Aspiration yes;notify SLP if any concerns;cough;elevated WBC count;gurgly voice;throat clearing;pneumonia;fever;upper respiratory;right lower lobe infiltrates  -      User Key  (r) = Recorded By, (t) = Taken By, (c) = Cosigned By    Initials Name Provider Type     Janet Cotton MS CCC-SLP Speech and Language Pathologist                              OP SLP Education     Row Name 04/18/18 8509       Education    Barriers to Learning Hearing deficit  -    Education Provided Described results of evaluation;Patient expressed understanding of evaluation  -    Assessed Learning needs;Learning motivation;Learning preferences;Learning readiness  -    Learning Motivation Strong  -    Learning Method  Explanation  -    Teaching Response Verbalized understanding  -    Education Comments Reviewed images of VFSS with patient  -      User Key  (r) = Recorded By, (t) = Taken By, (c) = Cosigned By    Initials Name Effective Dates     Janet Cotton MS CCC-SLP 03/07/18 -                     OP SLP Assessment/Plan - 04/18/18 1414        SLP Assessment    Clinical Impression: Swallowing WNL;esophageal dysphagia  -    Please refer to paper survey for additional self-reported information Yes  -    Please refer to items scanned into chart for additional diagnostic informaiton and handouts as provided by clinician Yes  -    Prognosis Good (comment)  -    Patient/caregiver participated in establishment of treatment plan and goals Yes  -    Patient would benefit from skilled therapy intervention Yes  -      User Key  (r) = Recorded By, (t) = Taken By, (c) = Cosigned By    Initials Name Provider Type     Janet Cotton MS CCC-SLP Speech and Language Pathologist              SLP Outcome Measures (last 72 hours)      SLP Outcome Measures     Row Name 04/18/18 1400             SLP Outcome Measures    Outcome Measure Used? Adult NOMS  -         FCM Scores    FCM Chosen Swallowing  -      Swallowing FCM Score 6  -        User Key  (r) = Recorded By, (t) = Taken By, (c) = Cosigned By    Initials Name Effective Dates     Janet Cotton MS CCC-SLP 03/07/18 -                Time Calculation:   SLP Start Time: 1300  SLP Stop Time: 1415  SLP Time Calculation (min): 75 min    Therapy Charges for Today     Code Description Service Date Service Provider Modifiers Qty    28013488202 HC ST SWALLOWING CURRENT STATUS 4/18/2018 Janet Cotton, MS CCC-SLP GN, CI 1    92249909265 HC ST SWALLOWING PROJECTED 4/18/2018 Janet Cotton, MS CCC-SLP GN, CI 1    70693375514 HC ST SWALLOWING DISCHARGE 4/18/2018 Janet Cotton, MS CCC-SLP GN, CI 1    46314340462 HC ST MOTION FLUORO EVAL SWALLOW 5 4/18/2018 Janet A MS Yury CCC-SLP GN 1           SLP G-Codes  Functional Limitations: Swallowing  Swallow Current Status (): At least 1 percent but less than 20 percent impaired, limited or restricted  Swallow Goal Status (): At least 1 percent but less than 20 percent impaired, limited or restricted  Swallow Discharge Status (): At least 1 percent but less than 20 percent impaired, limited or restricted        Janet Cotton MS CCC-SLP  4/18/2018

## 2018-08-22 ENCOUNTER — ANESTHESIA (OUTPATIENT)
Dept: PAIN MEDICINE | Facility: HOSPITAL | Age: 78
End: 2018-08-22

## 2018-08-22 ENCOUNTER — ANESTHESIA EVENT (OUTPATIENT)
Dept: PAIN MEDICINE | Facility: HOSPITAL | Age: 78
End: 2018-08-22

## 2018-08-22 ENCOUNTER — HOSPITAL ENCOUNTER (OUTPATIENT)
Dept: GENERAL RADIOLOGY | Facility: HOSPITAL | Age: 78
Discharge: HOME | End: 2018-08-22

## 2018-08-22 ENCOUNTER — HOSPITAL ENCOUNTER (OUTPATIENT)
Dept: PAIN MEDICINE | Facility: HOSPITAL | Age: 78
Discharge: HOME | End: 2018-08-22
Admitting: ANESTHESIOLOGY

## 2018-08-22 VITALS
RESPIRATION RATE: 16 BRPM | DIASTOLIC BLOOD PRESSURE: 96 MMHG | OXYGEN SATURATION: 96 % | TEMPERATURE: 97.5 F | HEART RATE: 76 BPM | SYSTOLIC BLOOD PRESSURE: 134 MMHG

## 2018-08-22 DIAGNOSIS — R52 PAIN: ICD-10-CM

## 2018-08-22 PROCEDURE — 25010000002 METHYLPREDNISOLONE PER 80 MG: Performed by: ANESTHESIOLOGY

## 2018-08-22 PROCEDURE — 0 IOPAMIDOL 41 % SOLUTION: Performed by: ANESTHESIOLOGY

## 2018-08-22 PROCEDURE — C1755 CATHETER, INTRASPINAL: HCPCS

## 2018-08-22 PROCEDURE — 77003 FLUOROGUIDE FOR SPINE INJECT: CPT

## 2018-08-22 RX ORDER — MIDAZOLAM HYDROCHLORIDE 1 MG/ML
1 INJECTION INTRAMUSCULAR; INTRAVENOUS AS NEEDED
Status: DISCONTINUED | OUTPATIENT
Start: 2018-08-22 | End: 2018-08-23 | Stop reason: HOSPADM

## 2018-08-22 RX ORDER — FENTANYL CITRATE 50 UG/ML
50 INJECTION, SOLUTION INTRAMUSCULAR; INTRAVENOUS AS NEEDED
Status: DISCONTINUED | OUTPATIENT
Start: 2018-08-22 | End: 2018-08-23 | Stop reason: HOSPADM

## 2018-08-22 RX ORDER — METHYLPREDNISOLONE ACETATE 80 MG/ML
80 INJECTION, SUSPENSION INTRA-ARTICULAR; INTRALESIONAL; INTRAMUSCULAR; SOFT TISSUE ONCE
Status: COMPLETED | OUTPATIENT
Start: 2018-08-22 | End: 2018-08-22

## 2018-08-22 RX ORDER — LIDOCAINE HYDROCHLORIDE 10 MG/ML
1 INJECTION, SOLUTION INFILTRATION; PERINEURAL ONCE AS NEEDED
Status: DISCONTINUED | OUTPATIENT
Start: 2018-08-22 | End: 2018-08-23 | Stop reason: HOSPADM

## 2018-08-22 RX ORDER — SODIUM CHLORIDE 0.9 % (FLUSH) 0.9 %
1-10 SYRINGE (ML) INJECTION AS NEEDED
Status: DISCONTINUED | OUTPATIENT
Start: 2018-08-22 | End: 2018-08-23 | Stop reason: HOSPADM

## 2018-08-22 RX ADMIN — METHYLPREDNISOLONE ACETATE 80 MG: 80 INJECTION, SUSPENSION INTRA-ARTICULAR; INTRALESIONAL; INTRAMUSCULAR; SOFT TISSUE at 12:57

## 2018-08-22 RX ADMIN — IOPAMIDOL 2 ML: 408 INJECTION, SOLUTION INTRATHECAL at 12:57

## 2018-08-22 NOTE — ANESTHESIA PROCEDURE NOTES
PAIN Epidural block    Patient location during procedure: pain clinic  Start Time: 8/22/2018 12:47 PM  Stop Time: 8/22/2018 12:59 PM  Indication:at surgeon's request and procedure for pain  Performed By  Anesthesiologist: LAURENCE WEISS  Preanesthetic Checklist  Completed: patient identified, site marked, surgical consent, pre-op evaluation, timeout performed, IV checked, risks and benefits discussed and monitors and equipment checked  Additional Notes  Post-Op Diagnosis Codes:     * Lumbar degenerative disc disease (M51.36)  Prep:  Pt Position:prone  Sterile Tech:cap, gloves, mask and sterile barrier  Prep:chlorhexidine gluconate and isopropyl alcohol  Monitoring:blood pressure monitoring, continuous pulse oximetry and EKG  Procedure:  Sedation: no   Approach:left paramedian  Guidance: fluoroscopy  Location:lumbar  Level:5-6  Needle Type:Kory  Needle Gauge:17 G  Aspiration:negative  Test Dose:negative  Medications:  Depomedrol:80 mg  Preservative Free Saline:4mL  Isovue:2mL  Comments:IMPRESSION:  1.  Transitional anatomy appreciated with what is being considered to be  lumbarization of S1. Careful correlation prior to any intervention is  required, given the presence of transitional anatomy.  2.  There is multilevel degenerative disease involving the lumbar spine  including loss of disc height, disc desiccation, endplate degenerative  changes and a grade 1 retrolisthesis at L2-L3. Posterior element  degenerative disease and mild to moderate canal stenosis and lateral  recess narrowing is also present at L2-L3. The greatest degree of neural  foraminal compromise is at L5-S1 to the right. There is no evidence of  focal herniation. See above.     Post Assessment:  Dressing:occlusive dressing applied  Pt Tolerance:patient tolerated the procedure well with no apparent complications  Complications:no

## 2019-07-03 ENCOUNTER — OFFICE VISIT (OUTPATIENT)
Dept: ORTHOPEDIC SURGERY | Facility: CLINIC | Age: 79
End: 2019-07-03

## 2019-07-03 VITALS — HEIGHT: 70 IN | WEIGHT: 204 LBS | TEMPERATURE: 97.9 F | BODY MASS INDEX: 29.2 KG/M2

## 2019-07-03 DIAGNOSIS — M25.561 PAIN IN BOTH KNEES, UNSPECIFIED CHRONICITY: Primary | ICD-10-CM

## 2019-07-03 DIAGNOSIS — M25.562 PAIN IN BOTH KNEES, UNSPECIFIED CHRONICITY: Primary | ICD-10-CM

## 2019-07-03 PROCEDURE — 99213 OFFICE O/P EST LOW 20 MIN: CPT | Performed by: ORTHOPAEDIC SURGERY

## 2019-07-03 PROCEDURE — 73562 X-RAY EXAM OF KNEE 3: CPT | Performed by: ORTHOPAEDIC SURGERY

## 2019-07-03 RX ORDER — LISINOPRIL 5 MG/1
2.5 TABLET ORAL DAILY
COMMUNITY
Start: 2019-06-08

## 2019-07-03 RX ORDER — MULTIVIT-MIN/IRON FUM/FOLIC AC 7.5 MG-4
1 TABLET ORAL DAILY
COMMUNITY
End: 2019-08-06

## 2019-07-03 RX ORDER — NAPROXEN SODIUM 220 MG
220 TABLET ORAL
COMMUNITY
End: 2019-08-06

## 2019-07-03 NOTE — PROGRESS NOTES
Patient: CHRIS Mccoy Jr.    YOB: 1940    Medical Record Number: [id number]    Chief Complaints: Bilateral knee pain    History of Present Illness:     78 y.o. male patient who presents for evaluation of both knees.  He reports a 2 to 3-year history of anterior knee pain.  He describes it as a burning pain.  He has had several cortisone injections and Visco injections.  The cortisone did help somewhat.  The Visco did not.  Of note, he does have a history of neuropathy and sciatica.  He is scheduled to see Dr. Little for the sciatica.  He has had several epidural steroid injections which did help somewhat with his sciatica but did not seem to alleviate his knee pain.  He did try some limited physical therapy.  It did not seem to help.  He takes Neurontin and ibuprofen which do help.  Describes his pain is moderate, intermittent and burning.  Localizes pain to the front of both knees.  Denies any catching, locking or instability.    Allergies:   Allergies   Allergen Reactions   • Penicillins Rash   • Sulfa Antibiotics Rash       Home Medications:      Current Outpatient Medications:   •  dutasteride (AVODART) 0.5 MG capsule, Take  by mouth., Disp: , Rfl:   •  gabapentin (NEURONTIN) 300 MG capsule, TAKE ONE CAPSULE BY MOUTH TWICE A DAY, Disp: , Rfl:   •  lisinopril (PRINIVIL,ZESTRIL) 5 MG tablet, , Disp: , Rfl:   •  Multiple Vitamins-Minerals (MULTIVITAMIN WITH MINERALS) tablet, Take 1 tablet by mouth., Disp: , Rfl:   •  Multiple Vitamins-Minerals (MULTIVITAMIN WITH MINERALS) tablet, Take 1 tablet by mouth Daily., Disp: , Rfl:   •  naproxen sodium (ALEVE) 220 MG tablet, Take 220 mg by mouth 2 (Two) Times a Day As Needed., Disp: , Rfl:   •  naproxen sodium (ALEVE) 220 MG tablet, Take 220 mg by mouth., Disp: , Rfl:   •  omeprazole (priLOSEC) 20 MG capsule, Take 20 mg by mouth Daily., Disp: , Rfl:   •  tamsulosin (FLOMAX) 0.4 MG capsule 24 hr capsule, Take 0.4 mg by mouth., Disp: , Rfl:     Past Medical  "History:   Diagnosis Date   • GERD (gastroesophageal reflux disease)    • Hypertension    • Low back pain        Past Surgical History:   Procedure Laterality Date   • TONSILLECTOMY      age 7       Social History     Occupational History   • Not on file   Tobacco Use   • Smoking status: Never Smoker   • Smokeless tobacco: Never Used   Substance and Sexual Activity   • Alcohol use: Yes     Comment: 4-5 drinks per week   • Drug use: Defer   • Sexual activity: Defer      Social History     Social History Narrative   • Not on file       Family History   Problem Relation Age of Onset   • Hypertension Father    • Diabetes Sister        Review of Systems:      Constitutional: Denies fever, shaking or chills   Eyes: Denies change in visual acuity   HEENT: Denies nasal congestion or sore throat   Respiratory: Denies cough or shortness of breath   Cardiovascular: Denies chest pain or edema  Endocrine: Denies tremors, palpitations, intolerance of heat or cold, polyuria, polydipsia.  GI: Denies abdominal pain, nausea, vomiting, bloody stools or diarrhea  : Denies frequency, urgency, incontinence, retention, or nocturia.  Musculoskeletal: Denies numbness, tingling or loss of motor function except as above  Integument: Denies rash, lesion or ulceration   Neurologic: Denies headache or focal weakness, deficits  Heme: Denies spontaneous or excessive bleeding, epistaxis, hematuria, melena, fatigue, enlarged or tender lymph nodes.      All other pertinent positives and negatives as noted above in HPI.    Physical Exam: 78 y.o. male    Vitals:    07/03/19 1016   Temp: 97.9 °F (36.6 °C)   TempSrc: Temporal   Weight: 92.5 kg (204 lb)   Height: 177.8 cm (70\")       General:  Patient is awake and alert.  Appears in no acute distress or discomfort.    Psych:  Affect and demeanor are appropriate.    Eyes:  Conjunctiva and sclera appear grossly normal.  Eyes track well and EOM seem to be intact.    Ears:  No gross abnormalities.  Hearing " adequate for the exam.    Cardiovascular:  Regular rate and rhythm.    Lungs:  Good chest expansion.  Breathing unlabored.    Lymph:  No palpable masses or adenopathy in either lower extremity    Extremities: Both knees are examined and his exam is symmetric.  Skin is benign.  No atrophy, swelling or masses.  No adenopathy.  No significant tenderness.  He has some mild tenderness over the lateral patellofemoral retinaculum bilaterally but nothing exquisite.  No tenderness over the patellar tendon.  No tenderness along the joint lines.  He has full, symmetric knee motion.  No instability.  Negative medial and lateral Kalpesh's test.  Normal motor and sensory function throughout both legs.  No pain with resisted knee extension.  He does have some hamstring tightness bilaterally with popliteal angles of approximately 140 degrees.  He has intact sensation throughout both legs and feet.  Some diminished sensation in his toes which is his baseline.  Palpable pedal pulses.  Brisk capillary refill.         Radiology:   AP, merchant's and lateral views of both knees are ordered and reviewed.  No comparison films are available.  He has what appears to be mild patellofemoral degenerative changes bilaterally.  Overall his joint spaces are well preserved.  No malalignment.  No lesions, masses or other concerning findings.    Assessment/Plan: Persistent anterior knee pain of unclear etiology.    He has patellofemoral chondromalacia but the severity of his pain does not really match up with the severity of his arthritis.  His pain sounds more neuropathic to me.   I think further therapy focusing on core and gluteal strengthening as well as hamstring stretching could likely alleviate whatever symptoms are coming from the patellofemoral arthritis.  We discussed that and he is frustrated that his symptoms have persisted this long without a definitive answer as to where this is coming from.      I have agreed to refer him for MRIs of  both knees to evaluate for intra-articular pathology.  I was ana maría with him about the fact that I think it unlikely we are going to find a source that could predictably be improved by surgery at this time.  We are going to go ahead and take a look with the MRIs.  I told him that I or Daniella will call him with the results of the MRI.  We will come up with a plan pending review of the MRIs.    Robert Bello MD    07/03/2019    CC to Milly Jackman APRN

## 2019-07-09 ENCOUNTER — OFFICE VISIT (OUTPATIENT)
Dept: ORTHOPEDIC SURGERY | Facility: CLINIC | Age: 79
End: 2019-07-09

## 2019-07-09 VITALS — WEIGHT: 204 LBS | HEIGHT: 70 IN | BODY MASS INDEX: 29.2 KG/M2 | TEMPERATURE: 98.2 F

## 2019-07-09 DIAGNOSIS — M48.061 SPINAL STENOSIS OF LUMBAR REGION, UNSPECIFIED WHETHER NEUROGENIC CLAUDICATION PRESENT: ICD-10-CM

## 2019-07-09 DIAGNOSIS — M41.9 ACQUIRED SCOLIOSIS: ICD-10-CM

## 2019-07-09 DIAGNOSIS — M54.50 LUMBAR SPINE PAIN: Primary | ICD-10-CM

## 2019-07-09 PROCEDURE — 99214 OFFICE O/P EST MOD 30 MIN: CPT | Performed by: ORTHOPAEDIC SURGERY

## 2019-07-09 PROCEDURE — 72100 X-RAY EXAM L-S SPINE 2/3 VWS: CPT | Performed by: ORTHOPAEDIC SURGERY

## 2019-07-10 ENCOUNTER — TELEPHONE (OUTPATIENT)
Dept: ORTHOPEDIC SURGERY | Facility: CLINIC | Age: 79
End: 2019-07-10

## 2019-07-12 NOTE — PROGRESS NOTES
New patient or new problem visit    CC: He complains of continued back pain which radiates into the left lower extremity    HPI: Back and leg pain predominantly the latter quite severe improved with epidurals but only temporarily.  Traction seem to help some as well but gabapentin 600 3 times daily has not.  No balance difficulties bowel or bladder complaints but this is hurting and interfering with physical activities including laying golf.    PFSH: See attached    ROS: See attached    PE: Constitutional: Vital signs above-noted.  Awake, alert and oriented    Psychiatric: Affect and insight do not appear grossly disturbed.    Pulmonary: Breathing is unlabored, color is good.    Skin: Warm, dry and normal turgor    Cardiac: Pedal pulses intact.  No edema.    Eyesight and hearing appear adequate for examination purposes      Musculoskeletal:  There is moderate tenderness to percussion and palpation of the spine. Motion appears undisturbed.  Posture is unremarkable to coronal and sagittal inspection.    The skin about the area is intact.  There is no palpable or visible deformity.  There is no local spasm.       Neurologic:   Reflexes are 2+ and symmetrical in the patellae and absent in the Achilles.   Motor function is undisturbed in quadriceps, EHL, and gastrocnemius   sensation appears symmetrically intact to light touch.  In the bilateral lower extremities there is left calf atrophy.   Clonus is absent..  Gait appears undisturbed.  SLR test negative    XRAY: Plain film x-rays show DISH, scoliosis, loss of lordosis and no change from 2017    Other: n/a    Impression: Scoliosis, disc degeneration, spinal stenosis failing to improve with conservative care    Plan: Lumbar myelogram and CT scan with an eye toward surgery.  I will call him with results

## 2019-07-22 RX ORDER — IBUPROFEN 200 MG
200 TABLET ORAL EVERY 6 HOURS PRN
COMMUNITY
End: 2019-08-06

## 2019-07-24 ENCOUNTER — HOSPITAL ENCOUNTER (OUTPATIENT)
Dept: MRI IMAGING | Facility: HOSPITAL | Age: 79
Discharge: HOME | End: 2019-07-24

## 2019-07-24 ENCOUNTER — HOSPITAL ENCOUNTER (OUTPATIENT)
Dept: MRI IMAGING | Facility: HOSPITAL | Age: 79
Discharge: HOME | End: 2019-07-24
Admitting: ORTHOPAEDIC SURGERY

## 2019-07-24 DIAGNOSIS — M25.562 PAIN IN BOTH KNEES, UNSPECIFIED CHRONICITY: ICD-10-CM

## 2019-07-24 DIAGNOSIS — M25.561 PAIN IN BOTH KNEES, UNSPECIFIED CHRONICITY: ICD-10-CM

## 2019-07-24 PROCEDURE — 73721 MRI JNT OF LWR EXTRE W/O DYE: CPT

## 2019-07-26 ENCOUNTER — TELEPHONE (OUTPATIENT)
Dept: ORTHOPEDIC SURGERY | Facility: CLINIC | Age: 79
End: 2019-07-26

## 2019-07-26 ENCOUNTER — HOSPITAL ENCOUNTER (OUTPATIENT)
Dept: CT IMAGING | Facility: HOSPITAL | Age: 79
Discharge: HOME | End: 2019-07-26
Admitting: ORTHOPAEDIC SURGERY

## 2019-07-26 ENCOUNTER — HOSPITAL ENCOUNTER (OUTPATIENT)
Dept: GENERAL RADIOLOGY | Facility: HOSPITAL | Age: 79
Discharge: HOME | End: 2019-07-26

## 2019-07-26 VITALS
DIASTOLIC BLOOD PRESSURE: 73 MMHG | SYSTOLIC BLOOD PRESSURE: 121 MMHG | OXYGEN SATURATION: 97 % | HEIGHT: 70 IN | HEART RATE: 61 BPM | RESPIRATION RATE: 16 BRPM | WEIGHT: 205 LBS | BODY MASS INDEX: 29.35 KG/M2

## 2019-07-26 DIAGNOSIS — M48.061 SPINAL STENOSIS OF LUMBAR REGION, UNSPECIFIED WHETHER NEUROGENIC CLAUDICATION PRESENT: ICD-10-CM

## 2019-07-26 DIAGNOSIS — M54.50 LUMBAR SPINE PAIN: ICD-10-CM

## 2019-07-26 PROCEDURE — 0 IOPAMIDOL 41 % SOLUTION: Performed by: RADIOLOGY

## 2019-07-26 PROCEDURE — 72132 CT LUMBAR SPINE W/DYE: CPT

## 2019-07-26 PROCEDURE — 62304 MYELOGRAPHY LUMBAR INJECTION: CPT

## 2019-07-26 PROCEDURE — 25010000003 LIDOCAINE 1 % SOLUTION: Performed by: RADIOLOGY

## 2019-07-26 PROCEDURE — 72240 MYELOGRAPHY NECK SPINE: CPT

## 2019-07-26 RX ORDER — LIDOCAINE HYDROCHLORIDE 10 MG/ML
10 INJECTION, SOLUTION INFILTRATION; PERINEURAL ONCE
Status: COMPLETED | OUTPATIENT
Start: 2019-07-26 | End: 2019-07-26

## 2019-07-26 RX ADMIN — IOPAMIDOL 20 ML: 408 INJECTION, SOLUTION INTRATHECAL at 09:10

## 2019-07-26 RX ADMIN — LIDOCAINE HYDROCHLORIDE 3 ML: 10 INJECTION, SOLUTION INFILTRATION; PERINEURAL at 09:07

## 2019-07-26 NOTE — TELEPHONE ENCOUNTER
We discussed his MRI findings and his options.  In talking with him, I still think the majority of his symptoms are neuropathic, possibly coming from the back.  He absolutely agrees.  He says the back is by far his biggest issue right now.  He is going to follow-up with Dr. Max and figure out a plan for dressing those symptoms.  He says the knees are secondary issues and he will follow-up as needed.  He will call me if anything changes.

## 2019-07-26 NOTE — DISCHARGE INSTRUCTIONS
EDUCATION /DISCHARGE INSTRUCTIONS:  A myelogram is a special radiology procedure of the spinal cord, spinal nerves and other related structures.  You will be awake during the examination.  An area of your lower back will be cleansed with an antiseptic solution.  The physician will inject a numbing medication in your lower back.  While your back is numb, a needle will be placed in the lower back area.  A small amount of spinal fluid may be withdrawn and sent to the lab if ordered by your physician. While the needle is in the back, an injection of a contrast material (xray dye) will be given through the needle.  The contrast material will allow the physician to see the spinal cord and spinal nerves.  Once injected, the needle will be removed and a band aid will be placed over the injection site.  The table will be tilted during the process to allow the contrast material to flow to particular areas in the spine.  Following the injection and xrays, you will be taken to the CT scan where more pictures will be taken. After the procedure is finished, the contrast material will be absorbed by your body and eliminated through your kidneys.  The radiologist will study and interpret your myelogram and send the results to your physician.  Procedure risks of a myelogram include, but are not limited to:  *  Bleeding   *  seizure  *  Infection   *  Headache, possibly severe requiring  *  Contrast reaction      a blood patch  *  Nerve or cord injury  *  Paralysis and death  Benefits of the procedure:  *  Best examination for delineating pathology related to spinal cord compression from a disc and/or nerve root compression  Alternatives to the procedure:  MRI - a non invasive procedure requiring intravenous contrast injection.  Cannot be done on patients with certain pacemakers or metal in the body.  MRI risks include possible reaction to the contrast material, movement of metal located in the body.   Benefit to MRI:  Non-invasive  and usually painless procedure.  THIS EDUCATION INFORMATION WAS REVIEWED PRIOR TO THE PROCEDURE AND CONSENT. Patient initials __________________Time___0807______________    Important information following your myelogram:  *  Lie down with your head elevated no more than 2 pillows for the next 24 hours.   *  Sit up in the car going home.  Sit up to eat and use the restroom only,  for 24 hours.  *  24 HOURS COMPLETE AT ___1100_____________________   *  Tomorrow, after 24 hours complete, take it easy and rest.  *  Do not drive for 48 hours following a myelogram  *  You may remove the bandage and shower in the morning  *  Increase your fluids for the next 24 hours.  Caffeinated drinks are encouraged.   Resume taking your blood thinner or Aspirin on __7/27/16 after 1100_________________________    CALL YOUR PHYSICIAN FOR THE FOLLOWING:  * Pain at the injection site  * Reddness, swelling, bruising or drainage at the injection site  * A fever by mouth of 101.0  * Any new symptoms  If you have problems with a headache that is not relieved with rest and medication, please call the Radiology Triage Nurse desk  [phone]

## 2019-07-27 ENCOUNTER — TELEPHONE (OUTPATIENT)
Dept: INTERVENTIONAL RADIOLOGY/VASCULAR | Facility: HOSPITAL | Age: 79
End: 2019-07-27

## 2019-07-29 ENCOUNTER — OFFICE VISIT (OUTPATIENT)
Dept: ORTHOPEDIC SURGERY | Facility: CLINIC | Age: 79
End: 2019-07-29

## 2019-07-29 ENCOUNTER — TELEPHONE (OUTPATIENT)
Dept: ORTHOPEDIC SURGERY | Facility: CLINIC | Age: 79
End: 2019-07-29

## 2019-07-29 VITALS — HEIGHT: 70 IN | BODY MASS INDEX: 35.79 KG/M2 | TEMPERATURE: 97.9 F | WEIGHT: 250 LBS

## 2019-07-29 DIAGNOSIS — M25.562 PAIN IN BOTH KNEES, UNSPECIFIED CHRONICITY: ICD-10-CM

## 2019-07-29 DIAGNOSIS — M48.061 SPINAL STENOSIS OF LUMBAR REGION, UNSPECIFIED WHETHER NEUROGENIC CLAUDICATION PRESENT: Primary | ICD-10-CM

## 2019-07-29 DIAGNOSIS — M17.10 ARTHRITIS OF KNEE: Primary | ICD-10-CM

## 2019-07-29 DIAGNOSIS — M41.9 ACQUIRED SCOLIOSIS: ICD-10-CM

## 2019-07-29 DIAGNOSIS — M25.561 PAIN IN BOTH KNEES, UNSPECIFIED CHRONICITY: ICD-10-CM

## 2019-07-29 PROCEDURE — 20610 DRAIN/INJ JOINT/BURSA W/O US: CPT | Performed by: ORTHOPAEDIC SURGERY

## 2019-07-29 PROCEDURE — 99213 OFFICE O/P EST LOW 20 MIN: CPT | Performed by: ORTHOPAEDIC SURGERY

## 2019-07-29 RX ADMIN — LIDOCAINE HYDROCHLORIDE 2 ML: 10 INJECTION, SOLUTION EPIDURAL; INFILTRATION; INTRACAUDAL; PERINEURAL at 14:01

## 2019-07-29 RX ADMIN — METHYLPREDNISOLONE ACETATE 80 MG: 80 INJECTION, SUSPENSION INTRA-ARTICULAR; INTRALESIONAL; INTRAMUSCULAR; SOFT TISSUE at 14:01

## 2019-07-29 NOTE — PROGRESS NOTES
Patient:CHRIS Mccoy Jr.    YOB: 1940    Medical Record Number:[id number]    Chief Complaints: Follow-up regarding bilateral knee pain    History of Present Illness:     78 y.o. male patient who presents for follow-up of both knees.  He comes in wanting to discuss his MRI results.  Symptoms are unchanged.  Pain is primarily in the front of his knees.  He describes it as a burning pain.  No posterior pain.  No mechanical symptoms or instability.    Allergies:  Allergies   Allergen Reactions   • Penicillins Rash   • Sulfa Antibiotics Rash       Home Medications:    Current Outpatient Medications:   •  Arginine 1000 MG tablet, Take 1,000 mg by mouth Daily., Disp: , Rfl:   •  dutasteride (AVODART) 0.5 MG capsule, Take 0.5 mg by mouth Daily., Disp: , Rfl:   •  gabapentin (NEURONTIN) 300 MG capsule, TAKE ONE CAPSULE BY MOUTH TWICE A DAY, Disp: , Rfl:   •  ibuprofen (ADVIL,MOTRIN) 200 MG tablet, Take 200 mg by mouth Every 6 (Six) Hours As Needed for Mild Pain ., Disp: , Rfl:   •  lisinopril (PRINIVIL,ZESTRIL) 5 MG tablet, Take 5 mg by mouth Daily. Takes 2.5 mg or 5 mg depending on his BP, Disp: , Rfl:   •  Multiple Vitamins-Minerals (MULTIVITAMIN WITH MINERALS) tablet, Take 1 tablet by mouth Daily., Disp: , Rfl:   •  Multiple Vitamins-Minerals (MULTIVITAMIN WITH MINERALS) tablet, Take 1 tablet by mouth Daily., Disp: , Rfl:   •  naproxen sodium (ALEVE) 220 MG tablet, Take 220 mg by mouth 2 (Two) Times a Day As Needed., Disp: , Rfl:   •  naproxen sodium (ALEVE) 220 MG tablet, Take 220 mg by mouth., Disp: , Rfl:   •  omeprazole (priLOSEC) 20 MG capsule, Take 20 mg by mouth Daily., Disp: , Rfl:   •  tamsulosin (FLOMAX) 0.4 MG capsule 24 hr capsule, Take 0.4 mg by mouth Every Night., Disp: , Rfl:     Past Medical History:   Diagnosis Date   • GERD (gastroesophageal reflux disease)    • Hypertension    • Low back pain        Past Surgical History:   Procedure Laterality Date   • LEG SURGERY Left     compound fx  "of lower left leg - age 9 or 10   • TONSILLECTOMY      age 7       Social History     Occupational History   • Not on file   Tobacco Use   • Smoking status: Never Smoker   • Smokeless tobacco: Never Used   Substance and Sexual Activity   • Alcohol use: Yes     Comment: 4-5 drinks per week   • Drug use: Defer   • Sexual activity: Defer      Social History     Social History Narrative   • Not on file       Family History   Problem Relation Age of Onset   • Hypertension Father    • Diabetes Sister        Review of Systems:      Constitutional: Denies fever, shaking or chills   Eyes: Denies change in visual acuity   HEENT: Denies nasal congestion or sore throat   Respiratory: Denies cough or shortness of breath   Cardiovascular: Denies chest pain or edema  Endocrine: Denies tremors, palpitations, intolerance of heat or cold, polyuria, polydipsia.  GI: Denies abdominal pain, nausea, vomiting, bloody stools or diarrhea  : Denies frequency, urgency, incontinence, retention, or nocturia.  Musculoskeletal: Denies numbness, tingling or loss of motor function except as above  Integument: Denies rash, lesion or ulceration   Neurologic: Denies headache or focal weakness, deficits  Heme: Denies spontaneous or excessive bleeding, epistaxis, hematuria, melena, fatigue, enlarged or tender lymph nodes.      All other pertinent positives and negatives as noted above in HPI.    Physical Exam:78 y.o. male  Vitals:    07/29/19 1340   Temp: 97.9 °F (36.6 °C)   TempSrc: Temporal   Weight: 113 kg (250 lb)   Height: 177.8 cm (70\")       General:  Patient is awake and alert.  Appears in no acute distress or discomfort.    Psych:  Affect and demeanor are appropriate.    Extremities: Both knees are briefly examined.  He has some mild tenderness anteriorly over the patellar tendons and lateral patellofemoral retinaculum but nothing exquisite.  He has no effusion on either side.  He has good motion.  Negative medial and lateral Kalpesh's test.  " Intact motor and sensory function.  I could not reproduce his anterior knee pain with a straight leg raise on either side.    Imaging: MRIs of both knees are reviewed along with the associated reports.    Left knee:    IMPRESSION:  There is some chondromalacia in the medial and lateral  compartments and a horizontal undersurface medial meniscus tear.    Right knee:     IMPRESSION:  Large, complex lateral meniscus tear predominantly involving  the body and anterior horn. There is some fraying along the free edge of  posterior horn and there are areas of chondromalacia along the posterior  weightbearing surfaces of the lateral compartment. More extensive,  full-thickness chondral loss is observed at the patellofemoral  compartment.    Assessment/Plan: 1.  Bilateral knee pain of unclear etiology   2. 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  It still sounds neuropathic.  I suggested a neuropathic cream.  He was given a prescription for this.  Risk were discussed.  I also suggested injections for both knees.  I think this would be helpful for both a diagnostic and therapeutic standpoint.  Last, I think he should get an opinion from Dr. Max to determine whether or not this may be coming from the back.  The risk, benefits and alternatives to the injections were discussed and then the injections were performed as described below.  We will see how he responds.  We will see him back as needed.  He will let me know if the symptoms persist and then I told him I will be happy to reevaluate.    Robert Bello MD    07/29/2019    Large Joint Arthrocentesis: R knee  Date/Time: 7/29/2019 2:01 PM  Consent given by: patient  Site marked: site marked  Timeout: Immediately prior to procedure a time out was called to verify the correct patient, procedure, equipment, support staff and site/side marked as required   Supporting  Documentation  Indications: pain and joint swelling   Procedure Details  Location: knee - R knee  Preparation: Patient was prepped and draped in the usual sterile fashion  Needle gauge: 21 G.  Approach: anterolateral  Medications administered: 80 mg methylPREDNISolone acetate 80 MG/ML; 2 mL lidocaine PF 1% 1 %  Patient tolerance: patient tolerated the procedure well with no immediate complications    Large Joint Arthrocentesis: L knee  Date/Time: 7/29/2019 2:01 PM  Consent given by: patient  Site marked: site marked  Timeout: Immediately prior to procedure a time out was called to verify the correct patient, procedure, equipment, support staff and site/side marked as required   Supporting Documentation  Indications: pain and joint swelling   Procedure Details  Location: knee - L knee  Preparation: Patient was prepped and draped in the usual sterile fashion  Needle gauge: 21 G.  Approach: anterolateral  Medications administered: 2 mL lidocaine PF 1% 1 %; 80 mg methylPREDNISolone acetate 80 MG/ML  Patient tolerance: patient tolerated the procedure well with no immediate complications

## 2019-07-31 RX ORDER — LIDOCAINE HYDROCHLORIDE 10 MG/ML
2 INJECTION, SOLUTION EPIDURAL; INFILTRATION; INTRACAUDAL; PERINEURAL
Status: COMPLETED | OUTPATIENT
Start: 2019-07-29 | End: 2019-07-29

## 2019-07-31 RX ORDER — METHYLPREDNISOLONE ACETATE 80 MG/ML
80 INJECTION, SUSPENSION INTRA-ARTICULAR; INTRALESIONAL; INTRAMUSCULAR; SOFT TISSUE
Status: COMPLETED | OUTPATIENT
Start: 2019-07-29 | End: 2019-07-29

## 2019-08-01 ENCOUNTER — PREP FOR SURGERY (OUTPATIENT)
Dept: OTHER | Facility: HOSPITAL | Age: 79
End: 2019-08-01

## 2019-08-01 DIAGNOSIS — M41.9 ACQUIRED SCOLIOSIS: Primary | ICD-10-CM

## 2019-08-01 DIAGNOSIS — M48.062 SPINAL STENOSIS OF LUMBAR REGION WITH NEUROGENIC CLAUDICATION: ICD-10-CM

## 2019-08-01 NOTE — TELEPHONE ENCOUNTER
Call returned to the patient regarding his myelogram and post myelogram CT.  I was unable to reach him but I did leave a message on his answering machine for him to contact me at the office

## 2019-08-01 NOTE — TELEPHONE ENCOUNTER
Patient returned my call.  We did discuss his myelogram and post myelogram CT results.  Does have severe stenosis at L2-L5 and possibly L5-S1.  Dr. Little is recommending an L2-S1 laminectomy and fusion with Dr. George Luong.  Will make the patient an appointment to see Dr. Luong.  Patient is agreeable to proceed

## 2019-08-02 ENCOUNTER — OFFICE VISIT (OUTPATIENT)
Dept: NEUROSURGERY | Facility: CLINIC | Age: 79
End: 2019-08-02

## 2019-08-02 VITALS
BODY MASS INDEX: 35.79 KG/M2 | HEART RATE: 67 BPM | HEIGHT: 70 IN | WEIGHT: 250 LBS | DIASTOLIC BLOOD PRESSURE: 85 MMHG | SYSTOLIC BLOOD PRESSURE: 144 MMHG

## 2019-08-02 DIAGNOSIS — M48.062 SPINAL STENOSIS OF LUMBAR REGION WITH NEUROGENIC CLAUDICATION: Primary | ICD-10-CM

## 2019-08-02 PROBLEM — M48.061 LUMBAR SPINAL STENOSIS: Status: ACTIVE | Noted: 2017-12-26

## 2019-08-02 PROCEDURE — 99204 OFFICE O/P NEW MOD 45 MIN: CPT | Performed by: NEUROLOGICAL SURGERY

## 2019-08-02 RX ORDER — CEFAZOLIN SODIUM 2 G/100ML
2 INJECTION, SOLUTION INTRAVENOUS ONCE
Status: CANCELLED | OUTPATIENT
Start: 2019-08-12 | End: 2019-08-02

## 2019-08-02 NOTE — PROGRESS NOTES
Subjective   Patient ID: CHRIS Mccoy Jr. is a 78 y.o. male is being seen for consultation today at the request of Alex Little MD  For back and leg pain with new lumbar myelogram.  He tried 3 REBA with no relief.  Mr. Mccoy takes Gabapentin and Ibuprofen or Tylenol for pain.     History of Present Illness     This patient has been having severe pain primarily in his left leg for several years now.  The pain has been getting steadily worse.  He does not have a lot of pain in his back.  Most of the pain in his leg is located in the left hip and radiates down the posterior lateral thigh posterior lateral calf with numbness and tingling in the bottom of his feet which he has always attributed to neuropathy.  He has some pain in his right leg as well recently but not as bad as the pain in the left.  He has been treated with physical therapy, epidural blocks and medications as well as time.  He has no difficulty with bowel and bladder control or other associated symptoms.  Any kind of activity makes the pain worse especially walking.    The following portions of the patient's history were reviewed and updated as appropriate: allergies, current medications, past family history, past medical history, past social history, past surgical history and problem list.    Review of Systems   Constitutional: Positive for activity change.   Musculoskeletal: Positive for back pain.   Neurological: Positive for weakness and numbness.   All other systems reviewed and are negative.      Objective   Physical Exam   Constitutional: He is oriented to person, place, and time. He appears well-developed and well-nourished.   HENT:   Head: Normocephalic and atraumatic.   Eyes: Conjunctivae and EOM are normal. Pupils are equal, round, and reactive to light.   Fundoscopic exam:       The right eye shows no papilledema. The right eye shows venous pulsations.        The left eye shows no papilledema. The left eye shows venous pulsations.    Neck: Carotid bruit is not present.   Neurological: He is oriented to person, place, and time. He has a normal Finger-Nose-Finger Test and a normal Heel to Shin Test. Gait normal.   Reflex Scores:       Tricep reflexes are 2+ on the right side and 2+ on the left side.       Bicep reflexes are 2+ on the right side and 2+ on the left side.       Brachioradialis reflexes are 2+ on the right side and 2+ on the left side.       Patellar reflexes are 2+ on the right side and 2+ on the left side.       Achilles reflexes are 2+ on the right side and 2+ on the left side.  Psychiatric: His speech is normal.     Neurologic Exam     Mental Status   Oriented to person, place, and time.   Registration of memory: Good recent and remote memory.   Attention: normal. Concentration: normal.   Speech: speech is normal   Level of consciousness: alert  Knowledge: consistent with education.     Cranial Nerves     CN II   Visual fields full to confrontation.   Visual acuity: normal    CN III, IV, VI   Pupils are equal, round, and reactive to light.  Extraocular motions are normal.     CN V   Facial sensation intact.   Right corneal reflex: normal  Left corneal reflex: normal    CN VII   Facial expression full, symmetric.   Right facial weakness: none  Left facial weakness: none    CN VIII   Hearing: intact    CN IX, X   Palate: symmetric    CN XI   Right sternocleidomastoid strength: normal  Left sternocleidomastoid strength: normal    CN XII   Tongue: not atrophic  Tongue deviation: none    Motor Exam   Muscle bulk: normal  Right arm tone: normal  Left arm tone: normal  Right leg tone: normal  Left leg tone: normal    Strength   Strength 5/5 except as noted.     Sensory Exam   Light touch normal.     Gait, Coordination, and Reflexes     Gait  Gait: normal    Coordination   Finger to nose coordination: normal  Heel to shin coordination: normal    Reflexes   Right brachioradialis: 2+  Left brachioradialis: 2+  Right biceps: 2+  Left  biceps: 2+  Right triceps: 2+  Left triceps: 2+  Right patellar: 2+  Left patellar: 2+  Right achilles: 2+  Left achilles: 2+  Right : 2+  Left : 2+      Assessment/Plan   Independent Review of Radiographic Studies:      I reviewed his myelogram and post myelographic CT scan done on 26 July.  Myelogram itself shows lateral recess stenosis on the right at L4-5 and more on the left at L3-4 and L2-3.  The nerves at L5-S1 fill out pretty well on the left but there appears to be some pressure on the right on 1 of the images particularly when he is standing.  On the post myelographic CT scan the lower thoracic spine down to L2 looks okay.  At L2-3 there is fairly severe stenosis particularly on the left side in the lateral recess consistent with the myelogram.  L3-4 also shows severe left-sided lateral recess stenosis again consistent with the myelogram.  L4-5 shows bilateral stenosis and L5-S1 shows severe lateral recess stenosis with foraminal stenosis.    Medical Decision Making:      I told the patient and his wife about the imaging.  I told him that from my point of view I think he is down to 2 options.  He can either continue to live with it or he can proceed with surgery.  If surgery were done I think he would require a L2-S1 decompression and fusion.  I told the patient about the risks, complications and expected outcome of the lumbar surgery.  I explained that there was an 80% chance of getting rid of the pain in the leg.  I explained that there would still be back pain after the surgery.  Initially this will be quite severe but will improve over time.  There is a 2 or 3% chance of infection, bleeding, CSF leak, damage to the nerve as a result of surgery, paralysis, as well as anesthetic risk.  There is a 10% chance of recurrent problems.  There is a 10% chance of nonunion or failure of the instrumentation.  We discussed the postoperative hospital and home course.  The patient does ask proceed.    He will  need to be scheduled for a: L2-S1 laminectomy with a fusion by orthopedics    CHRIS was seen today for back pain and leg pain.    Diagnoses and all orders for this visit:    Spinal stenosis of lumbar region with neurogenic claudication  -     Case Request; Standing  -     Follow anesthesia standing orders.; Standing  -     SCD (sequential compression device)- to be placed on patient in Pre-op; Standing  -     Verify/Perform Chlorhexidine Skin Prep; Standing  -     ceFAZolin (ANCEF) 2 g in sodium chloride 0.9 % 100 mL IVPB  -     Obtain informed consent; Standing  -     Case Request      Return for 2-3 week post op.

## 2019-08-05 DIAGNOSIS — M48.061 SPINAL STENOSIS OF LUMBAR REGION, UNSPECIFIED WHETHER NEUROGENIC CLAUDICATION PRESENT: Primary | ICD-10-CM

## 2019-08-05 DIAGNOSIS — M41.9 ACQUIRED SCOLIOSIS: ICD-10-CM

## 2019-08-06 ENCOUNTER — APPOINTMENT (OUTPATIENT)
Dept: PREADMISSION TESTING | Facility: HOSPITAL | Age: 79
End: 2019-08-06

## 2019-08-06 VITALS
BODY MASS INDEX: 29.88 KG/M2 | WEIGHT: 208.7 LBS | SYSTOLIC BLOOD PRESSURE: 145 MMHG | TEMPERATURE: 97.9 F | HEIGHT: 70 IN | RESPIRATION RATE: 20 BRPM | DIASTOLIC BLOOD PRESSURE: 80 MMHG | OXYGEN SATURATION: 98 % | HEART RATE: 58 BPM

## 2019-08-06 LAB
ANION GAP SERPL CALCULATED.3IONS-SCNC: 10.3 MMOL/L (ref 5–15)
BUN BLD-MCNC: 13 MG/DL (ref 8–23)
BUN/CREAT SERPL: 12.4 (ref 7–25)
CALCIUM SPEC-SCNC: 9.3 MG/DL (ref 8.6–10.5)
CHLORIDE SERPL-SCNC: 101 MMOL/L (ref 98–107)
CO2 SERPL-SCNC: 27.7 MMOL/L (ref 22–29)
CREAT BLD-MCNC: 1.05 MG/DL (ref 0.76–1.27)
DEPRECATED RDW RBC AUTO: 55.8 FL (ref 37–54)
ERYTHROCYTE [DISTWIDTH] IN BLOOD BY AUTOMATED COUNT: 15.2 % (ref 12.3–15.4)
GFR SERPL CREATININE-BSD FRML MDRD: 68 ML/MIN/1.73
GLUCOSE BLD-MCNC: 114 MG/DL (ref 65–99)
HCT VFR BLD AUTO: 47.4 % (ref 37.5–51)
HGB BLD-MCNC: 15.6 G/DL (ref 13–17.7)
MCH RBC QN AUTO: 32.5 PG (ref 26.6–33)
MCHC RBC AUTO-ENTMCNC: 32.9 G/DL (ref 31.5–35.7)
MCV RBC AUTO: 98.8 FL (ref 79–97)
PLATELET # BLD AUTO: 143 10*3/MM3 (ref 140–450)
PMV BLD AUTO: 10.9 FL (ref 6–12)
POTASSIUM BLD-SCNC: 5.3 MMOL/L (ref 3.5–5.2)
RBC # BLD AUTO: 4.8 10*6/MM3 (ref 4.14–5.8)
SODIUM BLD-SCNC: 139 MMOL/L (ref 136–145)
WBC NRBC COR # BLD: 5.9 10*3/MM3 (ref 3.4–10.8)

## 2019-08-06 PROCEDURE — 85027 COMPLETE CBC AUTOMATED: CPT | Performed by: ORTHOPAEDIC SURGERY

## 2019-08-06 PROCEDURE — 36415 COLL VENOUS BLD VENIPUNCTURE: CPT

## 2019-08-06 PROCEDURE — 80048 BASIC METABOLIC PNL TOTAL CA: CPT | Performed by: ORTHOPAEDIC SURGERY

## 2019-08-06 PROCEDURE — 93005 ELECTROCARDIOGRAM TRACING: CPT

## 2019-08-06 PROCEDURE — 93010 ELECTROCARDIOGRAM REPORT: CPT | Performed by: INTERNAL MEDICINE

## 2019-08-06 RX ORDER — IBUPROFEN 200 MG
200 TABLET ORAL EVERY 6 HOURS PRN
Status: ON HOLD | COMMUNITY
End: 2019-08-12

## 2019-08-06 RX ORDER — ACETAMINOPHEN 325 MG/1
650 TABLET ORAL EVERY 6 HOURS PRN
COMMUNITY

## 2019-08-06 RX ORDER — GABAPENTIN 600 MG/1
600 TABLET ORAL 3 TIMES DAILY
COMMUNITY
End: 2020-08-14

## 2019-08-06 RX ORDER — CHLORAL HYDRATE 500 MG
1000 CAPSULE ORAL
COMMUNITY
End: 2021-04-09

## 2019-08-06 NOTE — DISCHARGE INSTRUCTIONS
Take the following medications the morning of surgery with a small sip of water:  OMEPRAZOLE, GABAPENTIN      ARRIVAL TIME 0530 TO MAIN OR         General Instructions:  • Do not eat solid food after midnight the night before surgery.  • You may drink clear liquids day of surgery but must stop at least one hour before your hospital arrival time. - CUTOFF TIME 0430  • It is beneficial for you to have a clear drink that contains carbohydrates the day of surgery.  We suggest a 12 to 20 ounce bottle of Gatorade or Powerade for non-diabetic patients or a 12 to 20 ounce bottle of G2 or Powerade Zero for diabetic patients. (Pediatric patients, are not advised to drink a 12 to 20 ounce carbohydrate drink)    Clear liquids are liquids you can see through.  Nothing red in color.     Plain water                               Sports drinks  Sodas                                   Gelatin (Jell-O)  Fruit juices without pulp such as white grape juice and apple juice  Popsicles that contain no fruit or yogurt  Tea or coffee (no cream or milk added)  Gatorade / Powerade  G2 / Powerade Zero    • Infants may have breast milk up to four hours before surgery.  • Infants drinking formula may drink formula up to six hours before surgery.   • Patients who avoid smoking, chewing tobacco and alcohol for 4 weeks prior to surgery have a reduced risk of post-operative complications.  Quit smoking as many days before surgery as you can.  • Do not smoke, use chewing tobacco or drink alcohol the day of surgery.   • If applicable bring your C-PAP/ BI-PAP machine.  • Bring any papers given to you in the doctor’s office.  • Wear clean comfortable clothes and socks.  • Do not wear contact lenses, false eyelashes or make-up.  Bring a case for your glasses.   • Bring crutches or walker if applicable.  • Remove all piercings.  Leave jewelry and any other valuables at home.  • Hair extensions with metal clips must be removed prior to surgery.  • The  Pre-Admission Testing nurse will instruct you to bring medications if unable to obtain an accurate list in Pre-Admission Testing.        Preventing a Surgical Site Infection:  • For 2 to 3 days before surgery, avoid shaving with a razor because the razor can irritate skin and make it easier to develop an infection.    • Any areas of open skin can increase the risk of a post-operative wound infection by allowing bacteria to enter and travel throughout the body.  Notify your surgeon if you have any skin wounds / rashes even if it is not near the expected surgical site.  The area will need assessed to determine if surgery should be delayed until it is healed.  • The night prior to surgery sleep in a clean bed with clean clothing.  Do not allow pets to sleep with you.  • Shower on the morning of surgery using a fresh bar of anti-bacterial soap (such as Dial) and clean washcloth.  Dry with a clean towel and dress in clean clothing.  • Ask your surgeon if you will be receiving antibiotics prior to surgery.  • Make sure you, your family, and all healthcare providers clean their hands with soap and water or an alcohol based hand  before caring for you or your wound.    Day of surgery:  Upon arrival, a Pre-op nurse and Anesthesiologist will review your health history, obtain vital signs, and answer questions you may have.  The only belongings needed at this time will be a list of your home medications and if applicable your C-PAP/BI-PAP machine.  If you are staying overnight your family can leave the rest of your belongings in the car and bring them to your room later.  A Pre-op nurse will start an IV and you may receive medication in preparation for surgery, including something to help you relax.  Your family will be able to see you in the Pre-op area.  While you are in surgery your family should notify the waiting room  if they leave the waiting room area and provide a contact phone number.    Please  be aware that surgery does come with discomfort.  We want to make every effort to control your discomfort so please discuss any uncontrolled symptoms with your nurse.   Your doctor will most likely have prescribed pain medications.      If you are going home after surgery you will receive individualized written care instructions before being discharged.  A responsible adult must drive you to and from the hospital on the day of your surgery and stay with you for 24 hours.    If you are staying overnight following surgery, you will be transported to your hospital room following the recovery period.  [facility] has all private rooms.    You have received a list of surgical assistants for your reference.  If you have any questions please call Pre-Admission Testing at [phone].  Deductibles and co-payments are collected on the day of service. Please be prepared to pay the required co-pay, deductible or deposit on the day of service as defined by your plan.

## 2019-08-12 ENCOUNTER — ANESTHESIA EVENT (OUTPATIENT)
Dept: PERIOP | Facility: HOSPITAL | Age: 79
End: 2019-08-12

## 2019-08-12 ENCOUNTER — HOSPITAL ENCOUNTER (INPATIENT)
Facility: HOSPITAL | Age: 79
LOS: 3 days | Discharge: NURSING, REHAB OR LONG-TERM CARE | End: 2019-08-15
Attending: NEUROLOGICAL SURGERY | Admitting: NEUROLOGICAL SURGERY

## 2019-08-12 ENCOUNTER — APPOINTMENT (OUTPATIENT)
Dept: GENERAL RADIOLOGY | Facility: HOSPITAL | Age: 79
End: 2019-08-12

## 2019-08-12 ENCOUNTER — ANESTHESIA (OUTPATIENT)
Dept: PERIOP | Facility: HOSPITAL | Age: 79
End: 2019-08-12

## 2019-08-12 DIAGNOSIS — M41.9 ACQUIRED SCOLIOSIS: ICD-10-CM

## 2019-08-12 DIAGNOSIS — M48.062 SPINAL STENOSIS OF LUMBAR REGION WITH NEUROGENIC CLAUDICATION: ICD-10-CM

## 2019-08-12 LAB
ABO GROUP BLD: NORMAL
BLD GP AB SCN SERPL QL: NEGATIVE
HCT VFR BLD AUTO: 42.7 % (ref 37.5–51)
HGB BLD-MCNC: 13.5 G/DL (ref 13–17.7)
RH BLD: POSITIVE
T&S EXPIRATION DATE: NORMAL

## 2019-08-12 PROCEDURE — 25010000002 HYDROMORPHONE PER 4 MG: Performed by: NURSE ANESTHETIST, CERTIFIED REGISTERED

## 2019-08-12 PROCEDURE — C1713 ANCHOR/SCREW BN/BN,TIS/BN: HCPCS | Performed by: NEUROLOGICAL SURGERY

## 2019-08-12 PROCEDURE — S0260 H&P FOR SURGERY: HCPCS | Performed by: ORTHOPAEDIC SURGERY

## 2019-08-12 PROCEDURE — 25010000002 NEOSTIGMINE PER 0.5 MG: Performed by: NURSE ANESTHETIST, CERTIFIED REGISTERED

## 2019-08-12 PROCEDURE — 25010000002 FENTANYL CITRATE (PF) 100 MCG/2ML SOLUTION: Performed by: ANESTHESIOLOGY

## 2019-08-12 PROCEDURE — 86900 BLOOD TYPING SEROLOGIC ABO: CPT | Performed by: ORTHOPAEDIC SURGERY

## 2019-08-12 PROCEDURE — 25010000003 CEFAZOLIN IN DEXTROSE 2-4 GM/100ML-% SOLUTION: Performed by: NEUROLOGICAL SURGERY

## 2019-08-12 PROCEDURE — 63047 LAM FACETEC & FORAMOT LUMBAR: CPT | Performed by: NEUROLOGICAL SURGERY

## 2019-08-12 PROCEDURE — 25010000002 PROPOFOL 10 MG/ML EMULSION: Performed by: NURSE ANESTHETIST, CERTIFIED REGISTERED

## 2019-08-12 PROCEDURE — 86850 RBC ANTIBODY SCREEN: CPT | Performed by: ORTHOPAEDIC SURGERY

## 2019-08-12 PROCEDURE — 86901 BLOOD TYPING SEROLOGIC RH(D): CPT | Performed by: ORTHOPAEDIC SURGERY

## 2019-08-12 PROCEDURE — 25010000002 VANCOMYCIN 10 G RECONSTITUTED SOLUTION: Performed by: ORTHOPAEDIC SURGERY

## 2019-08-12 PROCEDURE — 25010000002 MIDAZOLAM PER 1 MG: Performed by: ANESTHESIOLOGY

## 2019-08-12 PROCEDURE — 25010000002 KETOROLAC TROMETHAMINE PER 15 MG: Performed by: NURSE ANESTHETIST, CERTIFIED REGISTERED

## 2019-08-12 PROCEDURE — 0SG1071 FUSION OF 2 OR MORE LUMBAR VERTEBRAL JOINTS WITH AUTOLOGOUS TISSUE SUBSTITUTE, POSTERIOR APPROACH, POSTERIOR COLUMN, OPEN APPROACH: ICD-10-PCS | Performed by: NEUROLOGICAL SURGERY

## 2019-08-12 PROCEDURE — 85014 HEMATOCRIT: CPT | Performed by: ORTHOPAEDIC SURGERY

## 2019-08-12 PROCEDURE — 20939 BONE MARROW ASPIR BONE GRFG: CPT | Performed by: ORTHOPAEDIC SURGERY

## 2019-08-12 PROCEDURE — 25010000002 ONDANSETRON PER 1 MG: Performed by: NURSE ANESTHETIST, CERTIFIED REGISTERED

## 2019-08-12 PROCEDURE — 22614 ARTHRD PST TQ 1NTRSPC EA ADD: CPT | Performed by: ORTHOPAEDIC SURGERY

## 2019-08-12 PROCEDURE — 07DR0ZZ EXTRACTION OF ILIAC BONE MARROW, OPEN APPROACH: ICD-10-PCS | Performed by: INTERNAL MEDICINE

## 2019-08-12 PROCEDURE — 76000 FLUOROSCOPY <1 HR PHYS/QHP: CPT

## 2019-08-12 PROCEDURE — 85018 HEMOGLOBIN: CPT | Performed by: ORTHOPAEDIC SURGERY

## 2019-08-12 PROCEDURE — 72100 X-RAY EXAM L-S SPINE 2/3 VWS: CPT

## 2019-08-12 PROCEDURE — 22842 INSERT SPINE FIXATION DEVICE: CPT | Performed by: ORTHOPAEDIC SURGERY

## 2019-08-12 PROCEDURE — 25010000002 DEXAMETHASONE PER 1 MG: Performed by: NURSE ANESTHETIST, CERTIFIED REGISTERED

## 2019-08-12 PROCEDURE — 25010000002 PHENYLEPHRINE PER 1 ML: Performed by: NURSE ANESTHETIST, CERTIFIED REGISTERED

## 2019-08-12 PROCEDURE — 22612 ARTHRD PST TQ 1NTRSPC LUMBAR: CPT | Performed by: ORTHOPAEDIC SURGERY

## 2019-08-12 PROCEDURE — 63048 LAM FACETEC &FORAMOT EA ADDL: CPT | Performed by: NEUROLOGICAL SURGERY

## 2019-08-12 DEVICE — SCRW EXPEDIUM PA TI 6X45MM: Type: IMPLANTABLE DEVICE | Site: SPINE LUMBAR | Status: FUNCTIONAL

## 2019-08-12 DEVICE — MATRX STRIP PILAFX DBM 50X25X4MM: Type: IMPLANTABLE DEVICE | Site: SPINE LUMBAR | Status: FUNCTIONAL

## 2019-08-12 DEVICE — SCRW EXPEDIUM PA TI 7X40MM: Type: IMPLANTABLE DEVICE | Site: SPINE LUMBAR | Status: FUNCTIONAL

## 2019-08-12 DEVICE — SCRW INNR EXPEDIUM: Type: IMPLANTABLE DEVICE | Site: SPINE LUMBAR | Status: FUNCTIONAL

## 2019-08-12 DEVICE — SSC BONE WAX
Type: IMPLANTABLE DEVICE | Site: SPINE LUMBAR | Status: FUNCTIONAL
Brand: SSC BONE WAX

## 2019-08-12 DEVICE — SCRW EXPEDIUM PA TI 7X45MM: Type: IMPLANTABLE DEVICE | Site: SPINE LUMBAR | Status: FUNCTIONAL

## 2019-08-12 DEVICE — SCRW EXPEDIUM PA TI 7.5X40MM: Type: IMPLANTABLE DEVICE | Site: SPINE LUMBAR | Status: FUNCTIONAL

## 2019-08-12 DEVICE — ROD PRELRD SPINE EXPEDIUM W/LINE TI 5.5X135MM: Type: IMPLANTABLE DEVICE | Site: SPINE LUMBAR | Status: FUNCTIONAL

## 2019-08-12 RX ORDER — KETOROLAC TROMETHAMINE 30 MG/ML
INJECTION, SOLUTION INTRAMUSCULAR; INTRAVENOUS AS NEEDED
Status: DISCONTINUED | OUTPATIENT
Start: 2019-08-12 | End: 2019-08-12 | Stop reason: SURG

## 2019-08-12 RX ORDER — FAMOTIDINE 10 MG/ML
20 INJECTION, SOLUTION INTRAVENOUS ONCE
Status: COMPLETED | OUTPATIENT
Start: 2019-08-12 | End: 2019-08-12

## 2019-08-12 RX ORDER — HYDRALAZINE HYDROCHLORIDE 20 MG/ML
5 INJECTION INTRAMUSCULAR; INTRAVENOUS
Status: DISCONTINUED | OUTPATIENT
Start: 2019-08-12 | End: 2019-08-12 | Stop reason: HOSPADM

## 2019-08-12 RX ORDER — ONDANSETRON 2 MG/ML
INJECTION INTRAMUSCULAR; INTRAVENOUS AS NEEDED
Status: DISCONTINUED | OUTPATIENT
Start: 2019-08-12 | End: 2019-08-12 | Stop reason: SURG

## 2019-08-12 RX ORDER — DIPHENHYDRAMINE HYDROCHLORIDE 50 MG/ML
12.5 INJECTION INTRAMUSCULAR; INTRAVENOUS
Status: DISCONTINUED | OUTPATIENT
Start: 2019-08-12 | End: 2019-08-12 | Stop reason: HOSPADM

## 2019-08-12 RX ORDER — MIDAZOLAM HYDROCHLORIDE 1 MG/ML
2 INJECTION INTRAMUSCULAR; INTRAVENOUS
Status: DISCONTINUED | OUTPATIENT
Start: 2019-08-12 | End: 2019-08-12 | Stop reason: HOSPADM

## 2019-08-12 RX ORDER — PROMETHAZINE HYDROCHLORIDE 25 MG/ML
12.5 INJECTION, SOLUTION INTRAMUSCULAR; INTRAVENOUS ONCE AS NEEDED
Status: DISCONTINUED | OUTPATIENT
Start: 2019-08-12 | End: 2019-08-12 | Stop reason: HOSPADM

## 2019-08-12 RX ORDER — GLYCOPYRROLATE 0.2 MG/ML
INJECTION INTRAMUSCULAR; INTRAVENOUS AS NEEDED
Status: DISCONTINUED | OUTPATIENT
Start: 2019-08-12 | End: 2019-08-12 | Stop reason: SURG

## 2019-08-12 RX ORDER — TEMAZEPAM 15 MG/1
15 CAPSULE ORAL NIGHTLY PRN
Status: DISCONTINUED | OUTPATIENT
Start: 2019-08-12 | End: 2019-08-15 | Stop reason: HOSPADM

## 2019-08-12 RX ORDER — HYDROCODONE BITARTRATE AND ACETAMINOPHEN 7.5; 325 MG/1; MG/1
1 TABLET ORAL ONCE AS NEEDED
Status: DISCONTINUED | OUTPATIENT
Start: 2019-08-12 | End: 2019-08-12 | Stop reason: HOSPADM

## 2019-08-12 RX ORDER — FINASTERIDE 5 MG/1
5 TABLET, FILM COATED ORAL DAILY
Status: DISCONTINUED | OUTPATIENT
Start: 2019-08-12 | End: 2019-08-15 | Stop reason: HOSPADM

## 2019-08-12 RX ORDER — SODIUM CHLORIDE 0.9 % (FLUSH) 0.9 %
3 SYRINGE (ML) INJECTION EVERY 12 HOURS SCHEDULED
Status: DISCONTINUED | OUTPATIENT
Start: 2019-08-12 | End: 2019-08-15 | Stop reason: HOSPADM

## 2019-08-12 RX ORDER — SODIUM CHLORIDE 450 MG/100ML
100 INJECTION, SOLUTION INTRAVENOUS CONTINUOUS
Status: DISCONTINUED | OUTPATIENT
Start: 2019-08-12 | End: 2019-08-14

## 2019-08-12 RX ORDER — LISINOPRIL 2.5 MG/1
2.5 TABLET ORAL DAILY
Status: DISCONTINUED | OUTPATIENT
Start: 2019-08-13 | End: 2019-08-13

## 2019-08-12 RX ORDER — ONDANSETRON 2 MG/ML
4 INJECTION INTRAMUSCULAR; INTRAVENOUS ONCE AS NEEDED
Status: DISCONTINUED | OUTPATIENT
Start: 2019-08-12 | End: 2019-08-12 | Stop reason: HOSPADM

## 2019-08-12 RX ORDER — PROMETHAZINE HYDROCHLORIDE 25 MG/1
25 TABLET ORAL ONCE AS NEEDED
Status: DISCONTINUED | OUTPATIENT
Start: 2019-08-12 | End: 2019-08-12 | Stop reason: HOSPADM

## 2019-08-12 RX ORDER — OXYCODONE HYDROCHLORIDE AND ACETAMINOPHEN 5; 325 MG/1; MG/1
2 TABLET ORAL EVERY 4 HOURS PRN
Status: DISCONTINUED | OUTPATIENT
Start: 2019-08-12 | End: 2019-08-15 | Stop reason: HOSPADM

## 2019-08-12 RX ORDER — SENNA AND DOCUSATE SODIUM 50; 8.6 MG/1; MG/1
1 TABLET, FILM COATED ORAL 2 TIMES DAILY
Status: DISCONTINUED | OUTPATIENT
Start: 2019-08-12 | End: 2019-08-14

## 2019-08-12 RX ORDER — PANTOPRAZOLE SODIUM 40 MG/1
40 TABLET, DELAYED RELEASE ORAL EVERY MORNING
Status: DISCONTINUED | OUTPATIENT
Start: 2019-08-13 | End: 2019-08-15 | Stop reason: HOSPADM

## 2019-08-12 RX ORDER — MIDAZOLAM HYDROCHLORIDE 1 MG/ML
1 INJECTION INTRAMUSCULAR; INTRAVENOUS
Status: DISCONTINUED | OUTPATIENT
Start: 2019-08-12 | End: 2019-08-12 | Stop reason: HOSPADM

## 2019-08-12 RX ORDER — SODIUM CHLORIDE 0.9 % (FLUSH) 0.9 %
1-10 SYRINGE (ML) INJECTION AS NEEDED
Status: DISCONTINUED | OUTPATIENT
Start: 2019-08-12 | End: 2019-08-15 | Stop reason: HOSPADM

## 2019-08-12 RX ORDER — EPHEDRINE SULFATE 50 MG/ML
5 INJECTION, SOLUTION INTRAVENOUS ONCE AS NEEDED
Status: DISCONTINUED | OUTPATIENT
Start: 2019-08-12 | End: 2019-08-12 | Stop reason: HOSPADM

## 2019-08-12 RX ORDER — BISACODYL 10 MG
10 SUPPOSITORY, RECTAL RECTAL DAILY PRN
Status: DISCONTINUED | OUTPATIENT
Start: 2019-08-12 | End: 2019-08-15 | Stop reason: HOSPADM

## 2019-08-12 RX ORDER — CEFAZOLIN SODIUM 2 G/100ML
2 INJECTION, SOLUTION INTRAVENOUS ONCE
Status: COMPLETED | OUTPATIENT
Start: 2019-08-12 | End: 2019-08-12

## 2019-08-12 RX ORDER — PROPOFOL 10 MG/ML
VIAL (ML) INTRAVENOUS AS NEEDED
Status: DISCONTINUED | OUTPATIENT
Start: 2019-08-12 | End: 2019-08-12 | Stop reason: SURG

## 2019-08-12 RX ORDER — ROCURONIUM BROMIDE 10 MG/ML
INJECTION, SOLUTION INTRAVENOUS AS NEEDED
Status: DISCONTINUED | OUTPATIENT
Start: 2019-08-12 | End: 2019-08-12 | Stop reason: SURG

## 2019-08-12 RX ORDER — SODIUM CHLORIDE 0.9 % (FLUSH) 0.9 %
1-10 SYRINGE (ML) INJECTION AS NEEDED
Status: DISCONTINUED | OUTPATIENT
Start: 2019-08-12 | End: 2019-08-12 | Stop reason: HOSPADM

## 2019-08-12 RX ORDER — DIPHENHYDRAMINE HCL 25 MG
25 CAPSULE ORAL
Status: DISCONTINUED | OUTPATIENT
Start: 2019-08-12 | End: 2019-08-12 | Stop reason: HOSPADM

## 2019-08-12 RX ORDER — DEXAMETHASONE SODIUM PHOSPHATE 10 MG/ML
INJECTION INTRAMUSCULAR; INTRAVENOUS AS NEEDED
Status: DISCONTINUED | OUTPATIENT
Start: 2019-08-12 | End: 2019-08-12 | Stop reason: SURG

## 2019-08-12 RX ORDER — EPHEDRINE SULFATE 50 MG/ML
INJECTION, SOLUTION INTRAVENOUS AS NEEDED
Status: DISCONTINUED | OUTPATIENT
Start: 2019-08-12 | End: 2019-08-12 | Stop reason: SURG

## 2019-08-12 RX ORDER — LABETALOL HYDROCHLORIDE 5 MG/ML
5 INJECTION, SOLUTION INTRAVENOUS
Status: DISCONTINUED | OUTPATIENT
Start: 2019-08-12 | End: 2019-08-12 | Stop reason: HOSPADM

## 2019-08-12 RX ORDER — FENTANYL CITRATE 50 UG/ML
50 INJECTION, SOLUTION INTRAMUSCULAR; INTRAVENOUS
Status: DISCONTINUED | OUTPATIENT
Start: 2019-08-12 | End: 2019-08-12 | Stop reason: HOSPADM

## 2019-08-12 RX ORDER — NALOXONE HCL 0.4 MG/ML
0.1 VIAL (ML) INJECTION
Status: DISCONTINUED | OUTPATIENT
Start: 2019-08-12 | End: 2019-08-15 | Stop reason: HOSPADM

## 2019-08-12 RX ORDER — CYCLOBENZAPRINE HCL 10 MG
10 TABLET ORAL 3 TIMES DAILY PRN
Status: DISCONTINUED | OUTPATIENT
Start: 2019-08-12 | End: 2019-08-15 | Stop reason: HOSPADM

## 2019-08-12 RX ORDER — HYDROMORPHONE HCL IN 0.9% NACL 10 MG/50ML
PATIENT CONTROLLED ANALGESIA SYRINGE INTRAVENOUS CONTINUOUS
Status: DISCONTINUED | OUTPATIENT
Start: 2019-08-12 | End: 2019-08-14

## 2019-08-12 RX ORDER — SODIUM CHLORIDE, SODIUM LACTATE, POTASSIUM CHLORIDE, CALCIUM CHLORIDE 600; 310; 30; 20 MG/100ML; MG/100ML; MG/100ML; MG/100ML
9 INJECTION, SOLUTION INTRAVENOUS CONTINUOUS
Status: DISCONTINUED | OUTPATIENT
Start: 2019-08-12 | End: 2019-08-15 | Stop reason: HOSPADM

## 2019-08-12 RX ORDER — FLUMAZENIL 0.1 MG/ML
0.2 INJECTION INTRAVENOUS AS NEEDED
Status: DISCONTINUED | OUTPATIENT
Start: 2019-08-12 | End: 2019-08-12 | Stop reason: HOSPADM

## 2019-08-12 RX ORDER — TAMSULOSIN HYDROCHLORIDE 0.4 MG/1
0.4 CAPSULE ORAL NIGHTLY
Status: DISCONTINUED | OUTPATIENT
Start: 2019-08-12 | End: 2019-08-15 | Stop reason: HOSPADM

## 2019-08-12 RX ORDER — OXYCODONE AND ACETAMINOPHEN 7.5; 325 MG/1; MG/1
1 TABLET ORAL ONCE AS NEEDED
Status: DISCONTINUED | OUTPATIENT
Start: 2019-08-12 | End: 2019-08-12 | Stop reason: HOSPADM

## 2019-08-12 RX ORDER — PROMETHAZINE HYDROCHLORIDE 25 MG/ML
6.25 INJECTION, SOLUTION INTRAMUSCULAR; INTRAVENOUS
Status: DISCONTINUED | OUTPATIENT
Start: 2019-08-12 | End: 2019-08-12 | Stop reason: HOSPADM

## 2019-08-12 RX ORDER — HYDROMORPHONE HYDROCHLORIDE 1 MG/ML
0.5 INJECTION, SOLUTION INTRAMUSCULAR; INTRAVENOUS; SUBCUTANEOUS
Status: DISCONTINUED | OUTPATIENT
Start: 2019-08-12 | End: 2019-08-12 | Stop reason: HOSPADM

## 2019-08-12 RX ORDER — PROMETHAZINE HYDROCHLORIDE 25 MG/1
25 SUPPOSITORY RECTAL ONCE AS NEEDED
Status: DISCONTINUED | OUTPATIENT
Start: 2019-08-12 | End: 2019-08-12 | Stop reason: HOSPADM

## 2019-08-12 RX ORDER — NALOXONE HCL 0.4 MG/ML
0.2 VIAL (ML) INJECTION AS NEEDED
Status: DISCONTINUED | OUTPATIENT
Start: 2019-08-12 | End: 2019-08-12 | Stop reason: HOSPADM

## 2019-08-12 RX ORDER — LIDOCAINE HYDROCHLORIDE 20 MG/ML
INJECTION, SOLUTION INFILTRATION; PERINEURAL AS NEEDED
Status: DISCONTINUED | OUTPATIENT
Start: 2019-08-12 | End: 2019-08-12 | Stop reason: SURG

## 2019-08-12 RX ORDER — LIDOCAINE HYDROCHLORIDE 10 MG/ML
0.5 INJECTION, SOLUTION EPIDURAL; INFILTRATION; INTRACAUDAL; PERINEURAL ONCE AS NEEDED
Status: DISCONTINUED | OUTPATIENT
Start: 2019-08-12 | End: 2019-08-12 | Stop reason: HOSPADM

## 2019-08-12 RX ORDER — ACETAMINOPHEN 325 MG/1
650 TABLET ORAL ONCE AS NEEDED
Status: DISCONTINUED | OUTPATIENT
Start: 2019-08-12 | End: 2019-08-12 | Stop reason: HOSPADM

## 2019-08-12 RX ORDER — GABAPENTIN 300 MG/1
600 CAPSULE ORAL EVERY 8 HOURS SCHEDULED
Status: DISCONTINUED | OUTPATIENT
Start: 2019-08-12 | End: 2019-08-15 | Stop reason: HOSPADM

## 2019-08-12 RX ORDER — HYDROMORPHONE HCL 110MG/55ML
PATIENT CONTROLLED ANALGESIA SYRINGE INTRAVENOUS AS NEEDED
Status: DISCONTINUED | OUTPATIENT
Start: 2019-08-12 | End: 2019-08-12 | Stop reason: SURG

## 2019-08-12 RX ADMIN — GLYCOPYRROLATE 0.3 MG: 0.2 INJECTION INTRAMUSCULAR; INTRAVENOUS at 11:37

## 2019-08-12 RX ADMIN — PHENYLEPHRINE HYDROCHLORIDE 100 MCG: 10 INJECTION INTRAVENOUS at 08:43

## 2019-08-12 RX ADMIN — MIDAZOLAM 2 MG: 1 INJECTION INTRAMUSCULAR; INTRAVENOUS at 07:16

## 2019-08-12 RX ADMIN — KETOROLAC TROMETHAMINE 30 MG: 30 INJECTION, SOLUTION INTRAMUSCULAR; INTRAVENOUS at 11:47

## 2019-08-12 RX ADMIN — EPHEDRINE SULFATE 5 MG: 50 INJECTION INTRAMUSCULAR; INTRAVENOUS; SUBCUTANEOUS at 09:36

## 2019-08-12 RX ADMIN — HYDROMORPHONE HYDROCHLORIDE 0.5 MG: 2 INJECTION INTRAMUSCULAR; INTRAVENOUS; SUBCUTANEOUS at 11:42

## 2019-08-12 RX ADMIN — FENTANYL CITRATE 100 MCG: 50 INJECTION INTRAMUSCULAR; INTRAVENOUS at 07:38

## 2019-08-12 RX ADMIN — SENNOSIDES AND DOCUSATE SODIUM 1 TABLET: 8.6; 5 TABLET ORAL at 20:35

## 2019-08-12 RX ADMIN — TAMSULOSIN HYDROCHLORIDE 0.4 MG: 0.4 CAPSULE ORAL at 20:35

## 2019-08-12 RX ADMIN — GABAPENTIN 600 MG: 300 CAPSULE ORAL at 22:01

## 2019-08-12 RX ADMIN — LIDOCAINE HYDROCHLORIDE 100 MG: 20 INJECTION, SOLUTION INFILTRATION; PERINEURAL at 07:38

## 2019-08-12 RX ADMIN — DEXAMETHASONE SODIUM PHOSPHATE 8 MG: 10 INJECTION INTRAMUSCULAR; INTRAVENOUS at 07:44

## 2019-08-12 RX ADMIN — NEOSTIGMINE METHYLSULFATE 3 MG: 1 INJECTION INTRAMUSCULAR; INTRAVENOUS; SUBCUTANEOUS at 11:37

## 2019-08-12 RX ADMIN — HYDROMORPHONE HYDROCHLORIDE 0.5 MG: 2 INJECTION INTRAMUSCULAR; INTRAVENOUS; SUBCUTANEOUS at 08:04

## 2019-08-12 RX ADMIN — PHENYLEPHRINE HYDROCHLORIDE 100 MCG: 10 INJECTION INTRAVENOUS at 09:36

## 2019-08-12 RX ADMIN — PHENYLEPHRINE HYDROCHLORIDE 100 MCG: 10 INJECTION INTRAVENOUS at 09:13

## 2019-08-12 RX ADMIN — ONDANSETRON 4 MG: 2 INJECTION INTRAMUSCULAR; INTRAVENOUS at 11:42

## 2019-08-12 RX ADMIN — HYDROMORPHONE HYDROCHLORIDE 0.5 MG: 1 INJECTION, SOLUTION INTRAMUSCULAR; INTRAVENOUS; SUBCUTANEOUS at 16:02

## 2019-08-12 RX ADMIN — CEFAZOLIN SODIUM 2 G: 2 INJECTION, SOLUTION INTRAVENOUS at 07:52

## 2019-08-12 RX ADMIN — SODIUM CHLORIDE, POTASSIUM CHLORIDE, SODIUM LACTATE AND CALCIUM CHLORIDE 9 ML/HR: 600; 310; 30; 20 INJECTION, SOLUTION INTRAVENOUS at 07:15

## 2019-08-12 RX ADMIN — EPHEDRINE SULFATE 5 MG: 50 INJECTION INTRAMUSCULAR; INTRAVENOUS; SUBCUTANEOUS at 09:13

## 2019-08-12 RX ADMIN — FENTANYL CITRATE 50 MCG: 50 INJECTION INTRAMUSCULAR; INTRAVENOUS at 11:54

## 2019-08-12 RX ADMIN — FAMOTIDINE 20 MG: 10 INJECTION INTRAVENOUS at 07:15

## 2019-08-12 RX ADMIN — PROPOFOL 170 MG: 10 INJECTION, EMULSION INTRAVENOUS at 07:38

## 2019-08-12 RX ADMIN — PHENYLEPHRINE HYDROCHLORIDE 100 MCG: 10 INJECTION INTRAVENOUS at 09:29

## 2019-08-12 RX ADMIN — HYDROMORPHONE HYDROCHLORIDE: 10 INJECTION INTRAMUSCULAR; INTRAVENOUS; SUBCUTANEOUS at 16:11

## 2019-08-12 RX ADMIN — ROCURONIUM BROMIDE 10 MG: 10 INJECTION INTRAVENOUS at 08:20

## 2019-08-12 RX ADMIN — VANCOMYCIN HYDROCHLORIDE 1750 MG: 10 INJECTION, POWDER, LYOPHILIZED, FOR SOLUTION INTRAVENOUS at 07:16

## 2019-08-12 RX ADMIN — FENTANYL CITRATE 50 MCG: 50 INJECTION INTRAMUSCULAR; INTRAVENOUS at 12:07

## 2019-08-12 RX ADMIN — FENTANYL CITRATE 50 MCG: 50 INJECTION INTRAMUSCULAR; INTRAVENOUS at 08:20

## 2019-08-12 RX ADMIN — VANCOMYCIN HYDROCHLORIDE 1500 MG: 10 INJECTION, POWDER, LYOPHILIZED, FOR SOLUTION INTRAVENOUS at 20:36

## 2019-08-12 RX ADMIN — POLYETHYLENE GLYCOL 3350 17 G: 17 POWDER, FOR SOLUTION ORAL at 19:43

## 2019-08-12 RX ADMIN — SODIUM CHLORIDE 100 ML/HR: 4.5 INJECTION, SOLUTION INTRAVENOUS at 19:36

## 2019-08-12 RX ADMIN — ROCURONIUM BROMIDE 50 MG: 10 INJECTION INTRAVENOUS at 07:38

## 2019-08-12 NOTE — ANESTHESIA PROCEDURE NOTES
Airway  Urgency: elective    Airway not difficult    General Information and Staff    Patient location during procedure: OR  CRNA: David Wiggins CRNA    Indications and Patient Condition  Indications for airway management: airway protection    Preoxygenated: yes  MILS maintained throughout  Mask difficulty assessment: 1 - vent by mask    Final Airway Details  Final airway type: endotracheal airway      Successful airway: ETT and reinforced tube  Cuffed: yes   Successful intubation technique: direct laryngoscopy  Facilitating devices/methods: anterior pressure/BURP  Endotracheal tube insertion site: oral  Blade: Ketan  Blade size: 3  ETT size (mm): 7.0  Cormack-Lehane Classification: grade I - full view of glottis  Placement verified by: chest auscultation   Measured from: teeth  ETT to teeth (cm): 21  Number of attempts at approach: 1

## 2019-08-12 NOTE — ANESTHESIA PREPROCEDURE EVALUATION
Anesthesia Evaluation     Patient summary reviewed and Nursing notes reviewed   NPO Solid Status: > 8 hours  NPO Liquid Status: > 2 hours           Airway   Mallampati: II  TM distance: >3 FB  Neck ROM: full  Dental - normal exam     Pulmonary - negative pulmonary ROS and normal exam   Cardiovascular - normal exam    (+) hypertension, dysrhythmias,       Neuro/Psych- negative ROS  GI/Hepatic/Renal/Endo    (+)  GERD,      Musculoskeletal (-) negative ROS    Abdominal    Substance History - negative use     OB/GYN negative ob/gyn ROS         Other                        Anesthesia Plan    ASA 3     general     Anesthetic plan, all risks, benefits, and alternatives have been provided, discussed and informed consent has been obtained with: patient.

## 2019-08-12 NOTE — ANESTHESIA POSTPROCEDURE EVALUATION
"Patient: CHRIS Mccoy Jr.    Procedure Summary     Date:  08/12/19 Room / Location:  [facility] OR [address] [place] [address] MAIN OR    Anesthesia Start:  0735 Anesthesia Stop:  1210    Procedures:       L2-S1 laminectomy with a fusion by orthopedics (N/A Spine Lumbar)      L2-S1 Laminectomy and Fusion with Instrumentation with Dr. Luong and Dr. Little (N/A Spine Lumbar) Diagnosis:       Spinal stenosis of lumbar region with neurogenic claudication      (Spinal stenosis of lumbar region with neurogenic claudication [M48.062])    Surgeon:  Adin Luong MD; Alex Little MD Provider:  Michael Hu MD    Anesthesia Type:  general ASA Status:  3          Anesthesia Type: general  Last vitals  BP   90/62 (08/12/19 1315)   Temp   36.6 °C (97.8 °F) (08/12/19 1206)   Pulse   104 (08/12/19 1315)   Resp   14 (08/12/19 1315)     SpO2   94 % (08/12/19 1315)     Post Anesthesia Care and Evaluation    Patient location during evaluation: bedside  Patient participation: complete - patient participated  Level of consciousness: awake  Pain score: 2  Pain management: adequate  Airway patency: patent  Anesthetic complications: No anesthetic complications  PONV Status: none  Cardiovascular status: acceptable  Respiratory status: acceptable  Hydration status: acceptable    Comments: BP 90/62   Pulse 104   Temp 36.6 °C (97.8 °F) (Oral)   Resp 14   Ht 177.8 cm (70\")   Wt 94.4 kg (208 lb 3 oz)   SpO2 94%   BMI 29.87 kg/m²         "

## 2019-08-13 PROBLEM — I95.81 POSTOPERATIVE HYPOTENSION: Status: ACTIVE | Noted: 2019-08-13

## 2019-08-13 LAB
HCT VFR BLD AUTO: 31.5 % (ref 37.5–51)
HGB BLD-MCNC: 10.3 G/DL (ref 13–17.7)

## 2019-08-13 PROCEDURE — 97110 THERAPEUTIC EXERCISES: CPT

## 2019-08-13 PROCEDURE — 99024 POSTOP FOLLOW-UP VISIT: CPT | Performed by: PHYSICIAN ASSISTANT

## 2019-08-13 PROCEDURE — 97162 PT EVAL MOD COMPLEX 30 MIN: CPT

## 2019-08-13 PROCEDURE — 85018 HEMOGLOBIN: CPT | Performed by: ORTHOPAEDIC SURGERY

## 2019-08-13 PROCEDURE — 85014 HEMATOCRIT: CPT | Performed by: ORTHOPAEDIC SURGERY

## 2019-08-13 PROCEDURE — 99024 POSTOP FOLLOW-UP VISIT: CPT | Performed by: ORTHOPAEDIC SURGERY

## 2019-08-13 RX ORDER — CALCIUM CARBONATE 200(500)MG
2 TABLET,CHEWABLE ORAL EVERY 4 HOURS PRN
Status: DISCONTINUED | OUTPATIENT
Start: 2019-08-13 | End: 2019-08-15 | Stop reason: HOSPADM

## 2019-08-13 RX ORDER — CALCIUM CARBONATE 200(500)MG
1 TABLET,CHEWABLE ORAL EVERY 4 HOURS PRN
Status: DISCONTINUED | OUTPATIENT
Start: 2019-08-13 | End: 2019-08-15 | Stop reason: HOSPADM

## 2019-08-13 RX ADMIN — SODIUM CHLORIDE, PRESERVATIVE FREE 3 ML: 5 INJECTION INTRAVENOUS at 11:50

## 2019-08-13 RX ADMIN — FINASTERIDE 5 MG: 5 TABLET, FILM COATED ORAL at 11:50

## 2019-08-13 RX ADMIN — SENNOSIDES AND DOCUSATE SODIUM 1 TABLET: 8.6; 5 TABLET ORAL at 21:49

## 2019-08-13 RX ADMIN — TAMSULOSIN HYDROCHLORIDE 0.4 MG: 0.4 CAPSULE ORAL at 21:49

## 2019-08-13 RX ADMIN — GABAPENTIN 300 MG: 300 CAPSULE ORAL at 21:48

## 2019-08-13 RX ADMIN — SODIUM CHLORIDE 100 ML/HR: 4.5 INJECTION, SOLUTION INTRAVENOUS at 11:49

## 2019-08-13 RX ADMIN — PANTOPRAZOLE SODIUM 40 MG: 40 TABLET, DELAYED RELEASE ORAL at 05:56

## 2019-08-13 RX ADMIN — GABAPENTIN 600 MG: 300 CAPSULE ORAL at 14:32

## 2019-08-13 RX ADMIN — SODIUM CHLORIDE 100 ML/HR: 4.5 INJECTION, SOLUTION INTRAVENOUS at 21:48

## 2019-08-13 RX ADMIN — POLYETHYLENE GLYCOL 3350 17 G: 17 POWDER, FOR SOLUTION ORAL at 21:49

## 2019-08-13 RX ADMIN — OXYCODONE HYDROCHLORIDE AND ACETAMINOPHEN 2 TABLET: 5; 325 TABLET ORAL at 11:48

## 2019-08-13 RX ADMIN — OXYCODONE HYDROCHLORIDE AND ACETAMINOPHEN 2 TABLET: 5; 325 TABLET ORAL at 16:44

## 2019-08-13 RX ADMIN — SENNOSIDES AND DOCUSATE SODIUM 1 TABLET: 8.6; 5 TABLET ORAL at 12:15

## 2019-08-13 RX ADMIN — OXYCODONE HYDROCHLORIDE AND ACETAMINOPHEN 2 TABLET: 5; 325 TABLET ORAL at 21:48

## 2019-08-14 PROBLEM — D69.59 THROMBOCYTOPENIA DUE TO BLOOD LOSS: Status: ACTIVE | Noted: 2019-08-14

## 2019-08-14 PROBLEM — D62 POSTOPERATIVE ANEMIA DUE TO ACUTE BLOOD LOSS: Status: ACTIVE | Noted: 2019-08-14

## 2019-08-14 LAB
BASOPHILS # BLD AUTO: 0.02 10*3/MM3 (ref 0–0.2)
BASOPHILS NFR BLD AUTO: 0.2 % (ref 0–1.5)
DEPRECATED RDW RBC AUTO: 57.3 FL (ref 37–54)
DIFFERENTIAL METHOD BLD: ABNORMAL
EOSINOPHIL # BLD AUTO: 0.04 10*3/MM3 (ref 0–0.4)
EOSINOPHIL NFR BLD AUTO: 0.4 % (ref 0.3–6.2)
ERYTHROCYTE [DISTWIDTH] IN BLOOD BY AUTOMATED COUNT: 15.7 % (ref 12.3–15.4)
HCT VFR BLD AUTO: 29.9 % (ref 37.5–51)
HGB BLD-MCNC: 9.6 G/DL (ref 13–17.7)
IMM GRANULOCYTES # BLD AUTO: 0.08 10*3/MM3 (ref 0–0.05)
IMM GRANULOCYTES NFR BLD AUTO: 0.8 % (ref 0–0.5)
LYMPHOCYTES # BLD AUTO: 0.45 10*3/MM3 (ref 0.7–3.1)
LYMPHOCYTES NFR BLD AUTO: 4.3 % (ref 19.6–45.3)
MCH RBC QN AUTO: 32.2 PG (ref 26.6–33)
MCHC RBC AUTO-ENTMCNC: 32.1 G/DL (ref 31.5–35.7)
MCV RBC AUTO: 100.3 FL (ref 79–97)
MONOCYTES # BLD AUTO: 0.61 10*3/MM3 (ref 0.1–0.9)
MONOCYTES NFR BLD AUTO: 5.8 % (ref 5–12)
NEUTROPHILS # BLD AUTO: 9.24 10*3/MM3 (ref 1.7–7)
NEUTROPHILS NFR BLD AUTO: 88.5 % (ref 42.7–76)
NRBC BLD AUTO-RTO: 0 /100 WBC (ref 0–0.2)
PLATELET # BLD AUTO: 81 10*3/MM3 (ref 140–450)
PMV BLD AUTO: 11.3 FL (ref 6–12)
RBC # BLD AUTO: 2.98 10*6/MM3 (ref 4.14–5.8)
WBC # BLD AUTO: 10.44 10*3/MM3 (ref 3.4–10.8)
WBC NRBC COR # BLD: 10.44 10*3/MM3 (ref 3.4–10.8)

## 2019-08-14 PROCEDURE — 85025 COMPLETE CBC W/AUTO DIFF WBC: CPT | Performed by: PHYSICIAN ASSISTANT

## 2019-08-14 PROCEDURE — 99024 POSTOP FOLLOW-UP VISIT: CPT | Performed by: PHYSICIAN ASSISTANT

## 2019-08-14 PROCEDURE — 99024 POSTOP FOLLOW-UP VISIT: CPT | Performed by: ORTHOPAEDIC SURGERY

## 2019-08-14 PROCEDURE — 97110 THERAPEUTIC EXERCISES: CPT

## 2019-08-14 RX ORDER — SENNA AND DOCUSATE SODIUM 50; 8.6 MG/1; MG/1
2 TABLET, FILM COATED ORAL 2 TIMES DAILY
Status: DISCONTINUED | OUTPATIENT
Start: 2019-08-14 | End: 2019-08-15 | Stop reason: HOSPADM

## 2019-08-14 RX ADMIN — SODIUM CHLORIDE 100 ML/HR: 4.5 INJECTION, SOLUTION INTRAVENOUS at 07:55

## 2019-08-14 RX ADMIN — OXYCODONE HYDROCHLORIDE AND ACETAMINOPHEN 2 TABLET: 5; 325 TABLET ORAL at 12:16

## 2019-08-14 RX ADMIN — OXYCODONE HYDROCHLORIDE AND ACETAMINOPHEN 2 TABLET: 5; 325 TABLET ORAL at 22:33

## 2019-08-14 RX ADMIN — SENNOSIDES AND DOCUSATE SODIUM 2 TABLET: 8.6; 5 TABLET ORAL at 21:44

## 2019-08-14 RX ADMIN — BISACODYL 10 MG: 10 SUPPOSITORY RECTAL at 12:16

## 2019-08-14 RX ADMIN — POLYETHYLENE GLYCOL 3350 17 G: 17 POWDER, FOR SOLUTION ORAL at 21:44

## 2019-08-14 RX ADMIN — CYCLOBENZAPRINE 10 MG: 10 TABLET, FILM COATED ORAL at 21:44

## 2019-08-14 RX ADMIN — SENNOSIDES AND DOCUSATE SODIUM 1 TABLET: 8.6; 5 TABLET ORAL at 08:44

## 2019-08-14 RX ADMIN — OXYCODONE HYDROCHLORIDE AND ACETAMINOPHEN 2 TABLET: 5; 325 TABLET ORAL at 08:44

## 2019-08-14 RX ADMIN — SENNOSIDES AND DOCUSATE SODIUM 2 TABLET: 8.6; 5 TABLET ORAL at 16:35

## 2019-08-14 RX ADMIN — GABAPENTIN 600 MG: 300 CAPSULE ORAL at 13:33

## 2019-08-14 RX ADMIN — SODIUM CHLORIDE, PRESERVATIVE FREE 3 ML: 5 INJECTION INTRAVENOUS at 08:45

## 2019-08-14 RX ADMIN — GABAPENTIN 600 MG: 300 CAPSULE ORAL at 21:44

## 2019-08-14 RX ADMIN — PANTOPRAZOLE SODIUM 40 MG: 40 TABLET, DELAYED RELEASE ORAL at 06:15

## 2019-08-14 RX ADMIN — OXYCODONE HYDROCHLORIDE AND ACETAMINOPHEN 2 TABLET: 5; 325 TABLET ORAL at 18:41

## 2019-08-14 RX ADMIN — POLYETHYLENE GLYCOL 3350 17 G: 17 POWDER, FOR SOLUTION ORAL at 08:44

## 2019-08-14 RX ADMIN — FINASTERIDE 5 MG: 5 TABLET, FILM COATED ORAL at 08:44

## 2019-08-14 RX ADMIN — TAMSULOSIN HYDROCHLORIDE 0.4 MG: 0.4 CAPSULE ORAL at 21:44

## 2019-08-14 RX ADMIN — SODIUM CHLORIDE, PRESERVATIVE FREE 3 ML: 5 INJECTION INTRAVENOUS at 21:45

## 2019-08-14 RX ADMIN — OXYCODONE HYDROCHLORIDE AND ACETAMINOPHEN 2 TABLET: 5; 325 TABLET ORAL at 04:14

## 2019-08-15 ENCOUNTER — TELEPHONE (OUTPATIENT)
Dept: ORTHOPEDIC SURGERY | Facility: CLINIC | Age: 79
End: 2019-08-15

## 2019-08-15 VITALS
DIASTOLIC BLOOD PRESSURE: 82 MMHG | WEIGHT: 208.19 LBS | BODY MASS INDEX: 29.8 KG/M2 | RESPIRATION RATE: 19 BRPM | OXYGEN SATURATION: 99 % | HEIGHT: 70 IN | TEMPERATURE: 99.2 F | HEART RATE: 83 BPM | SYSTOLIC BLOOD PRESSURE: 135 MMHG

## 2019-08-15 LAB
BASOPHILS # BLD AUTO: 0.02 10*3/MM3 (ref 0–0.2)
BASOPHILS NFR BLD AUTO: 0.3 % (ref 0–1.5)
DEPRECATED RDW RBC AUTO: 57.9 FL (ref 37–54)
EOSINOPHIL # BLD AUTO: 0.04 10*3/MM3 (ref 0–0.4)
EOSINOPHIL NFR BLD AUTO: 0.5 % (ref 0.3–6.2)
ERYTHROCYTE [DISTWIDTH] IN BLOOD BY AUTOMATED COUNT: 15.5 % (ref 12.3–15.4)
HCT VFR BLD AUTO: 25.7 % (ref 37.5–51)
HGB BLD-MCNC: 8.5 G/DL (ref 13–17.7)
IMM GRANULOCYTES # BLD AUTO: 0.04 10*3/MM3 (ref 0–0.05)
IMM GRANULOCYTES NFR BLD AUTO: 0.5 % (ref 0–0.5)
LYMPHOCYTES # BLD AUTO: 0.47 10*3/MM3 (ref 0.7–3.1)
LYMPHOCYTES NFR BLD AUTO: 6.1 % (ref 19.6–45.3)
MCH RBC QN AUTO: 33.6 PG (ref 26.6–33)
MCHC RBC AUTO-ENTMCNC: 33.1 G/DL (ref 31.5–35.7)
MCV RBC AUTO: 101.6 FL (ref 79–97)
MONOCYTES # BLD AUTO: 0.47 10*3/MM3 (ref 0.1–0.9)
MONOCYTES NFR BLD AUTO: 6.1 % (ref 5–12)
NEUTROPHILS # BLD AUTO: 6.64 10*3/MM3 (ref 1.7–7)
NEUTROPHILS NFR BLD AUTO: 86.5 % (ref 42.7–76)
NRBC BLD AUTO-RTO: 0 /100 WBC (ref 0–0.2)
PLATELET # BLD AUTO: 69 10*3/MM3 (ref 140–450)
PLATELET # BLD AUTO: 72 10*3/MM3 (ref 140–450)
PLATELETS.RETICULATED NFR BLD AUTO: 6 % (ref 0.9–6.5)
PMV BLD AUTO: 11.5 FL (ref 6–12)
RBC # BLD AUTO: 2.53 10*6/MM3 (ref 4.14–5.8)
VIT B12 BLD-MCNC: 392 PG/ML (ref 211–946)
WBC NRBC COR # BLD: 7.68 10*3/MM3 (ref 3.4–10.8)

## 2019-08-15 PROCEDURE — 94799 UNLISTED PULMONARY SVC/PX: CPT

## 2019-08-15 PROCEDURE — 82607 VITAMIN B-12: CPT | Performed by: INTERNAL MEDICINE

## 2019-08-15 PROCEDURE — 85025 COMPLETE CBC W/AUTO DIFF WBC: CPT | Performed by: PHYSICIAN ASSISTANT

## 2019-08-15 PROCEDURE — 85055 RETICULATED PLATELET ASSAY: CPT | Performed by: INTERNAL MEDICINE

## 2019-08-15 PROCEDURE — 99024 POSTOP FOLLOW-UP VISIT: CPT | Performed by: NURSE PRACTITIONER

## 2019-08-15 PROCEDURE — 99024 POSTOP FOLLOW-UP VISIT: CPT | Performed by: ORTHOPAEDIC SURGERY

## 2019-08-15 RX ORDER — CALCIUM CARBONATE 200(500)MG
1 TABLET,CHEWABLE ORAL EVERY 4 HOURS PRN
Start: 2019-08-15 | End: 2020-08-22

## 2019-08-15 RX ORDER — OXYCODONE HYDROCHLORIDE AND ACETAMINOPHEN 5; 325 MG/1; MG/1
TABLET ORAL
Qty: 50 TABLET | Refills: 0
Start: 2019-08-15 | End: 2019-08-30 | Stop reason: SDUPTHER

## 2019-08-15 RX ORDER — SENNA AND DOCUSATE SODIUM 50; 8.6 MG/1; MG/1
2 TABLET, FILM COATED ORAL 2 TIMES DAILY
Start: 2019-08-15 | End: 2020-05-12

## 2019-08-15 RX ORDER — DOXYCYCLINE HYCLATE 50 MG/1
324 CAPSULE, GELATIN COATED ORAL
Qty: 30 TABLET | Refills: 0
Start: 2019-08-15 | End: 2019-09-14

## 2019-08-15 RX ADMIN — PANTOPRAZOLE SODIUM 40 MG: 40 TABLET, DELAYED RELEASE ORAL at 06:26

## 2019-08-15 RX ADMIN — FINASTERIDE 5 MG: 5 TABLET, FILM COATED ORAL at 09:14

## 2019-08-15 RX ADMIN — SODIUM CHLORIDE, PRESERVATIVE FREE 3 ML: 5 INJECTION INTRAVENOUS at 09:14

## 2019-08-15 RX ADMIN — OXYCODONE HYDROCHLORIDE AND ACETAMINOPHEN 2 TABLET: 5; 325 TABLET ORAL at 15:21

## 2019-08-15 RX ADMIN — OXYCODONE HYDROCHLORIDE AND ACETAMINOPHEN 2 TABLET: 5; 325 TABLET ORAL at 02:53

## 2019-08-15 RX ADMIN — GABAPENTIN 600 MG: 300 CAPSULE ORAL at 15:21

## 2019-08-15 RX ADMIN — OXYCODONE HYDROCHLORIDE AND ACETAMINOPHEN 2 TABLET: 5; 325 TABLET ORAL at 09:12

## 2019-08-15 RX ADMIN — POLYETHYLENE GLYCOL 3350 17 G: 17 POWDER, FOR SOLUTION ORAL at 09:14

## 2019-08-15 RX ADMIN — GABAPENTIN 600 MG: 300 CAPSULE ORAL at 06:26

## 2019-08-15 RX ADMIN — SENNOSIDES AND DOCUSATE SODIUM 2 TABLET: 8.6; 5 TABLET ORAL at 09:12

## 2019-08-20 ENCOUNTER — TELEPHONE (OUTPATIENT)
Dept: ORTHOPEDIC SURGERY | Facility: CLINIC | Age: 79
End: 2019-08-20

## 2019-08-20 NOTE — TELEPHONE ENCOUNTER
Wife called stating patient is in post op rehab at [facility]. Patient has a post op appt with HERLINDA on 8/30. She wants to know how long patient is supposed to be in rehab and will he be having home health rehab after discharge?

## 2019-08-30 ENCOUNTER — OFFICE VISIT (OUTPATIENT)
Dept: ORTHOPEDIC SURGERY | Facility: CLINIC | Age: 79
End: 2019-08-30

## 2019-08-30 VITALS — HEIGHT: 70 IN | WEIGHT: 208 LBS | BODY MASS INDEX: 29.78 KG/M2 | TEMPERATURE: 98.3 F

## 2019-08-30 DIAGNOSIS — Z98.1 S/P LUMBAR FUSION: Primary | ICD-10-CM

## 2019-08-30 PROCEDURE — 72100 X-RAY EXAM L-S SPINE 2/3 VWS: CPT | Performed by: ORTHOPAEDIC SURGERY

## 2019-08-30 PROCEDURE — 99024 POSTOP FOLLOW-UP VISIT: CPT | Performed by: ORTHOPAEDIC SURGERY

## 2019-09-03 RX ORDER — OXYCODONE HYDROCHLORIDE AND ACETAMINOPHEN 5; 325 MG/1; MG/1
TABLET ORAL
Qty: 50 TABLET | Refills: 0 | Status: SHIPPED | OUTPATIENT
Start: 2019-09-03 | End: 2020-05-12

## 2019-09-04 ENCOUNTER — TELEPHONE (OUTPATIENT)
Dept: ORTHOPEDIC SURGERY | Facility: CLINIC | Age: 79
End: 2019-09-04

## 2019-09-04 ENCOUNTER — HOSPITAL ENCOUNTER (OUTPATIENT)
Facility: HOSPITAL | Age: 79
Setting detail: OBSERVATION
Discharge: HOME | End: 2019-09-05
Attending: EMERGENCY MEDICINE | Admitting: HOSPITALIST

## 2019-09-04 ENCOUNTER — APPOINTMENT (OUTPATIENT)
Dept: GENERAL RADIOLOGY | Facility: HOSPITAL | Age: 79
End: 2019-09-04

## 2019-09-04 DIAGNOSIS — J18.9 HOSPITAL-ACQUIRED PNEUMONIA: Primary | ICD-10-CM
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
## 2019-09-04 DIAGNOSIS — R50.9 FEVER IN ADULT: ICD-10-CM

## 2019-09-04 LAB
ALBUMIN SERPL-MCNC: 3.6 G/DL (ref 3.5–5.2)
ALBUMIN/GLOB SERPL: 1.2 G/DL
ALP SERPL-CCNC: 120 U/L (ref 39–117)
ALT SERPL W P-5'-P-CCNC: 9 U/L (ref 1–41)
ANION GAP SERPL CALCULATED.3IONS-SCNC: 12.1 MMOL/L (ref 5–15)
AST SERPL-CCNC: 14 U/L (ref 1–40)
BACTERIA UR QL AUTO: NORMAL /HPF
BASOPHILS # BLD AUTO: 0.05 10*3/MM3 (ref 0–0.2)
BASOPHILS NFR BLD AUTO: 0.5 % (ref 0–1.5)
BILIRUB SERPL-MCNC: 0.5 MG/DL (ref 0.2–1.2)
BILIRUB UR QL STRIP: NEGATIVE
BUN BLD-MCNC: 14 MG/DL (ref 8–23)
BUN/CREAT SERPL: 13.1 (ref 7–25)
CALCIUM SPEC-SCNC: 9 MG/DL (ref 8.6–10.5)
CHLORIDE SERPL-SCNC: 101 MMOL/L (ref 98–107)
CLARITY UR: CLEAR
CO2 SERPL-SCNC: 23.9 MMOL/L (ref 22–29)
COLOR UR: YELLOW
CREAT BLD-MCNC: 1.07 MG/DL (ref 0.76–1.27)
D-LACTATE SERPL-SCNC: 1.8 MMOL/L (ref 0.5–2)
DEPRECATED RDW RBC AUTO: 54.6 FL (ref 37–54)
EOSINOPHIL # BLD AUTO: 0.03 10*3/MM3 (ref 0–0.4)
EOSINOPHIL NFR BLD AUTO: 0.3 % (ref 0.3–6.2)
ERYTHROCYTE [DISTWIDTH] IN BLOOD BY AUTOMATED COUNT: 15.2 % (ref 12.3–15.4)
GFR SERPL CREATININE-BSD FRML MDRD: 67 ML/MIN/1.73
GLOBULIN UR ELPH-MCNC: 3.1 GM/DL
GLUCOSE BLD-MCNC: 144 MG/DL (ref 65–99)
GLUCOSE UR STRIP-MCNC: NEGATIVE MG/DL
HCT VFR BLD AUTO: 36.6 % (ref 37.5–51)
HGB BLD-MCNC: 11.7 G/DL (ref 13–17.7)
HGB UR QL STRIP.AUTO: NEGATIVE
HOLD SPECIMEN: NORMAL
HOLD SPECIMEN: NORMAL
HYALINE CASTS UR QL AUTO: NORMAL /LPF
IMM GRANULOCYTES # BLD AUTO: 0.07 10*3/MM3 (ref 0–0.05)
IMM GRANULOCYTES NFR BLD AUTO: 0.6 % (ref 0–0.5)
KETONES UR QL STRIP: ABNORMAL
LEUKOCYTE ESTERASE UR QL STRIP.AUTO: ABNORMAL
LYMPHOCYTES # BLD AUTO: 0.17 10*3/MM3 (ref 0.7–3.1)
LYMPHOCYTES NFR BLD AUTO: 1.5 % (ref 19.6–45.3)
MCH RBC QN AUTO: 31.3 PG (ref 26.6–33)
MCHC RBC AUTO-ENTMCNC: 32 G/DL (ref 31.5–35.7)
MCV RBC AUTO: 97.9 FL (ref 79–97)
MONOCYTES # BLD AUTO: 0.4 10*3/MM3 (ref 0.1–0.9)
MONOCYTES NFR BLD AUTO: 3.6 % (ref 5–12)
NEUTROPHILS # BLD AUTO: 10.3 10*3/MM3 (ref 1.7–7)
NEUTROPHILS NFR BLD AUTO: 93.5 % (ref 42.7–76)
NITRITE UR QL STRIP: NEGATIVE
NRBC BLD AUTO-RTO: 0 /100 WBC (ref 0–0.2)
PH UR STRIP.AUTO: 7 [PH] (ref 5–8)
PLATELET # BLD AUTO: 207 10*3/MM3 (ref 140–450)
PMV BLD AUTO: 10.5 FL (ref 6–12)
POTASSIUM BLD-SCNC: 4.1 MMOL/L (ref 3.5–5.2)
PROCALCITONIN SERPL-MCNC: 0.35 NG/ML (ref 0.1–0.25)
PROT SERPL-MCNC: 6.7 G/DL (ref 6–8.5)
PROT UR QL STRIP: ABNORMAL
RBC # BLD AUTO: 3.74 10*6/MM3 (ref 4.14–5.8)
RBC # UR: NORMAL /HPF
REF LAB TEST METHOD: NORMAL
SODIUM BLD-SCNC: 137 MMOL/L (ref 136–145)
SP GR UR STRIP: 1.03 (ref 1–1.03)
SQUAMOUS #/AREA URNS HPF: NORMAL /HPF
UROBILINOGEN UR QL STRIP: ABNORMAL
WBC NRBC COR # BLD: 11.02 10*3/MM3 (ref 3.4–10.8)
WBC UR QL AUTO: NORMAL /HPF
WHOLE BLOOD HOLD SPECIMEN: NORMAL
WHOLE BLOOD HOLD SPECIMEN: NORMAL

## 2019-09-04 PROCEDURE — 84145 PROCALCITONIN (PCT): CPT | Performed by: NURSE PRACTITIONER

## 2019-09-04 PROCEDURE — 81001 URINALYSIS AUTO W/SCOPE: CPT | Performed by: NURSE PRACTITIONER

## 2019-09-04 PROCEDURE — 25010000002 CEFEPIME PER 500 MG: Performed by: NURSE PRACTITIONER

## 2019-09-04 PROCEDURE — 94799 UNLISTED PULMONARY SVC/PX: CPT

## 2019-09-04 PROCEDURE — G0378 HOSPITAL OBSERVATION PER HR: HCPCS

## 2019-09-04 PROCEDURE — 99284 EMERGENCY DEPT VISIT MOD MDM: CPT

## 2019-09-04 PROCEDURE — 85025 COMPLETE CBC W/AUTO DIFF WBC: CPT | Performed by: NURSE PRACTITIONER

## 2019-09-04 PROCEDURE — 80053 COMPREHEN METABOLIC PANEL: CPT | Performed by: NURSE PRACTITIONER

## 2019-09-04 PROCEDURE — 25010000002 CEFEPIME PER 500 MG: Performed by: HOSPITALIST

## 2019-09-04 PROCEDURE — 71046 X-RAY EXAM CHEST 2 VIEWS: CPT

## 2019-09-04 PROCEDURE — 87040 BLOOD CULTURE FOR BACTERIA: CPT | Performed by: NURSE PRACTITIONER

## 2019-09-04 PROCEDURE — 25010000002 VANCOMYCIN 10 G RECONSTITUTED SOLUTION: Performed by: NURSE PRACTITIONER

## 2019-09-04 PROCEDURE — 96365 THER/PROPH/DIAG IV INF INIT: CPT

## 2019-09-04 PROCEDURE — 96367 TX/PROPH/DG ADDL SEQ IV INF: CPT

## 2019-09-04 PROCEDURE — 83605 ASSAY OF LACTIC ACID: CPT | Performed by: NURSE PRACTITIONER

## 2019-09-04 RX ORDER — ACETAMINOPHEN 500 MG
1000 TABLET ORAL ONCE
Status: DISCONTINUED | OUTPATIENT
Start: 2019-09-04 | End: 2019-09-04

## 2019-09-04 RX ORDER — TAMSULOSIN HYDROCHLORIDE 0.4 MG/1
0.4 CAPSULE ORAL NIGHTLY
Status: DISCONTINUED | OUTPATIENT
Start: 2019-09-04 | End: 2019-09-05 | Stop reason: HOSPADM

## 2019-09-04 RX ORDER — ACETAMINOPHEN 500 MG
500 TABLET ORAL ONCE
Status: COMPLETED | OUTPATIENT
Start: 2019-09-04 | End: 2019-09-04

## 2019-09-04 RX ORDER — GABAPENTIN 100 MG/1
100 CAPSULE ORAL EVERY 8 HOURS SCHEDULED
Status: DISCONTINUED | OUTPATIENT
Start: 2019-09-04 | End: 2019-09-05 | Stop reason: HOSPADM

## 2019-09-04 RX ORDER — ACETAMINOPHEN 650 MG/1
650 SUPPOSITORY RECTAL EVERY 4 HOURS PRN
Status: DISCONTINUED | OUTPATIENT
Start: 2019-09-04 | End: 2019-09-05 | Stop reason: HOSPADM

## 2019-09-04 RX ORDER — PANTOPRAZOLE SODIUM 40 MG/1
40 TABLET, DELAYED RELEASE ORAL EVERY MORNING
Status: DISCONTINUED | OUTPATIENT
Start: 2019-09-05 | End: 2019-09-05 | Stop reason: HOSPADM

## 2019-09-04 RX ORDER — ACETAMINOPHEN 325 MG/1
650 TABLET ORAL EVERY 4 HOURS PRN
Status: DISCONTINUED | OUTPATIENT
Start: 2019-09-04 | End: 2019-09-05 | Stop reason: HOSPADM

## 2019-09-04 RX ORDER — LISINOPRIL 2.5 MG/1
2.5 TABLET ORAL DAILY
Status: DISCONTINUED | OUTPATIENT
Start: 2019-09-04 | End: 2019-09-05 | Stop reason: HOSPADM

## 2019-09-04 RX ORDER — ACETAMINOPHEN 325 MG/1
650 TABLET ORAL EVERY 6 HOURS PRN
Status: DISCONTINUED | OUTPATIENT
Start: 2019-09-04 | End: 2019-09-05 | Stop reason: HOSPADM

## 2019-09-04 RX ORDER — ONDANSETRON 4 MG/1
4 TABLET, FILM COATED ORAL EVERY 6 HOURS PRN
Status: DISCONTINUED | OUTPATIENT
Start: 2019-09-04 | End: 2019-09-05 | Stop reason: HOSPADM

## 2019-09-04 RX ORDER — SODIUM CHLORIDE 0.9 % (FLUSH) 0.9 %
10 SYRINGE (ML) INJECTION AS NEEDED
Status: DISCONTINUED | OUTPATIENT
Start: 2019-09-04 | End: 2019-09-05 | Stop reason: HOSPADM

## 2019-09-04 RX ORDER — ACETAMINOPHEN 160 MG/5ML
650 SOLUTION ORAL EVERY 4 HOURS PRN
Status: DISCONTINUED | OUTPATIENT
Start: 2019-09-04 | End: 2019-09-05 | Stop reason: HOSPADM

## 2019-09-04 RX ORDER — OXYCODONE HYDROCHLORIDE AND ACETAMINOPHEN 5; 325 MG/1; MG/1
1 TABLET ORAL EVERY 4 HOURS PRN
Status: DISCONTINUED | OUTPATIENT
Start: 2019-09-04 | End: 2019-09-05 | Stop reason: HOSPADM

## 2019-09-04 RX ORDER — SODIUM CHLORIDE 0.9 % (FLUSH) 0.9 %
10 SYRINGE (ML) INJECTION EVERY 12 HOURS SCHEDULED
Status: DISCONTINUED | OUTPATIENT
Start: 2019-09-04 | End: 2019-09-05 | Stop reason: HOSPADM

## 2019-09-04 RX ORDER — FINASTERIDE 5 MG/1
5 TABLET, FILM COATED ORAL DAILY
Status: DISCONTINUED | OUTPATIENT
Start: 2019-09-04 | End: 2019-09-05 | Stop reason: HOSPADM

## 2019-09-04 RX ORDER — SENNA AND DOCUSATE SODIUM 50; 8.6 MG/1; MG/1
2 TABLET, FILM COATED ORAL 2 TIMES DAILY
Status: DISCONTINUED | OUTPATIENT
Start: 2019-09-04 | End: 2019-09-05 | Stop reason: HOSPADM

## 2019-09-04 RX ORDER — ONDANSETRON 2 MG/ML
4 INJECTION INTRAMUSCULAR; INTRAVENOUS EVERY 6 HOURS PRN
Status: DISCONTINUED | OUTPATIENT
Start: 2019-09-04 | End: 2019-09-05 | Stop reason: HOSPADM

## 2019-09-04 RX ADMIN — GABAPENTIN 100 MG: 100 CAPSULE ORAL at 21:43

## 2019-09-04 RX ADMIN — CEFEPIME HYDROCHLORIDE 2 G: 2 INJECTION, POWDER, FOR SOLUTION INTRAVENOUS at 21:42

## 2019-09-04 RX ADMIN — OXYCODONE HYDROCHLORIDE AND ACETAMINOPHEN 1 TABLET: 5; 325 TABLET ORAL at 21:56

## 2019-09-04 RX ADMIN — POLYETHYLENE GLYCOL 3350 17 G: 17 POWDER, FOR SOLUTION ORAL at 21:42

## 2019-09-04 RX ADMIN — SODIUM CHLORIDE 500 ML: 9 INJECTION, SOLUTION INTRAVENOUS at 10:55

## 2019-09-04 RX ADMIN — SENNOSIDES AND DOCUSATE SODIUM 2 TABLET: 8.6; 5 TABLET ORAL at 21:42

## 2019-09-04 RX ADMIN — ACETAMINOPHEN 500 MG: 500 TABLET, FILM COATED ORAL at 10:44

## 2019-09-04 RX ADMIN — VANCOMYCIN HYDROCHLORIDE 1750 MG: 10 INJECTION, POWDER, LYOPHILIZED, FOR SOLUTION INTRAVENOUS at 13:27

## 2019-09-04 RX ADMIN — TAMSULOSIN HYDROCHLORIDE 0.4 MG: 0.4 CAPSULE ORAL at 21:42

## 2019-09-04 RX ADMIN — SODIUM CHLORIDE, PRESERVATIVE FREE 10 ML: 5 INJECTION INTRAVENOUS at 21:44

## 2019-09-04 RX ADMIN — CEFEPIME HYDROCHLORIDE 2 G: 2 INJECTION, POWDER, FOR SOLUTION INTRAVENOUS at 12:43

## 2019-09-04 NOTE — PROGRESS NOTES
"Pharmacokinetic Consult - Vancomycin Dosing     CHRIS Mccoy JrMatt is on day #1 pharmacy to dose vancomycin for PNA.  Pharmacy dosing vancomycin per 's request.   Goal trough: 15-20 mg/L   Duration: 7 days  Other Antibiotics: Cefepime 2 grams iv q 8 hours    Relevant clinical data and objective history reviewed:  78 y.o. male 177.8 cm (70\") 93.2 kg (205 lb 6.4 oz)    Past Medical History:   Diagnosis Date   • BPH (benign prostatic hyperplasia)    • GERD (gastroesophageal reflux disease)    • History of transfusion    • Hypertension    • Right bundle branch block    • Spinal stenosis of lumbar region     NUMBNESS/ TINGLING BILAT FEET, PAIN DOWN LEFT LEG      Creatinine   Date Value Ref Range Status   09/04/2019 1.07 0.76 - 1.27 mg/dL Final     BUN   Date Value Ref Range Status   09/04/2019 14 8 - 23 mg/dL Final     Estimated Creatinine Clearance: 66.3 mL/min (by C-G formula based on SCr of 1.07 mg/dL).    Lab Results   Component Value Date    WBC 11.02 (H) 09/04/2019     Temp Readings from Last 3 Encounters:   09/04/19 99.5 °F (37.5 °C) (Oral)   08/30/19 98.3 °F (36.8 °C)   08/15/19 99.2 °F (37.3 °C) (Oral)     Baseline culture/source/susceptibility: 9/4 MRSA In Process                                                                9/4 B/C x 2 In Process    Anti-Infectives (From admission, onward)    Ordered     Dose/Rate Route Frequency Start Stop    09/04/19 1647  vancomycin 1250 mg/250 mL 0.9% NS IVPB ([facility])     Ordering Provider:  Genaro Skaggs MD    13 mg/kg × 93.2 kg  over 120 Minutes Intravenous Every 12 Hours 09/05/19 0130 09/12/19 0129    09/04/19 1637  cefepime (MAXIPIME) 2 g/100 mL 0.9% NS (mbp)     Ordering Provider:  Genaro Skaggs MD    2 g  25 mL/hr over 4 Hours Intravenous Every 8 Hours 09/04/19 2100 09/07/19 0459    09/04/19 1632  Pharmacy to dose vancomycin     Ordering Provider:  Genaro Skaggs MD     Does not apply Continuous PRN 09/04/19 1631 09/11/19 1630    09/04/19 1225  cefepime " (MAXIPIME) 2 g/100 mL 0.9% NS (mbp)     Ordering Provider:  Tiffany Paez APRN    2 g  200 mL/hr over 30 Minutes Intravenous Once 09/04/19 1227 09/04/19 1320    09/04/19 1140  vancomycin 1750 mg/500 mL 0.9% NS IVPB ([facility])     Ordering Provider:  Tiffany Paez APRN    20 mg/kg × 93.2 kg Intravenous Once 09/04/19 1142 09/04/19 1327           VANCOMYCIN DOSING SCHEDULE ( INCLUDING LEVELS)  Vancomycin 1750mg IV (20mg/kg) x1 dose load                        9/04   1327  Vancomycin 1250mg IV (13mg/kg) iv q 12 hours                        9/05   0130   1330                        9/06   0130   1330  Vancomycin trough prior to 5th dose                        9/06               1300    Assessment/Plan  1. See above for dosing schedule   2. Monitor renal function…bmp in am  Scr =1.04  3. Encourage hydration to prevent toxic accumulation   4. Monitor for s/sx of toxicity including incr in SCr and decr in UOP  5. Pharmacy will continue to follow and adjust accordingly    Ruthy Gordillo [facility]

## 2019-09-04 NOTE — H&P
HISTORY AND PHYSICAL   [facility]        Patient Identification:  Name: CHRIS Mccoy Jr.  Age: 78 y.o.  Sex: male  :  1940  MRN: [id number]                     Primary Care Physician: Cheng Ngo MD    Chief Complaint: fever    History of Present Illness:        Patient is a 78-year-old white male with BPH, GERD, hypertension, right bundle branch block and recent surgery for lumbar spinal stenosis who was admitted with history of waking up on the day of admission with fever and chills.  He is not really been coughing much.  He had back surgery recently and his wounds healing nicely without any redness or drainage.  He has not had any nausea or vomiting.  He denied having any chest pain or shortness of air.  The patient was evaluated in the ER and chest x-ray showed infiltrate consistent with pneumonia.  Patient was started on broad-spectrum IV antibiotics for healthcare acquired pneumonia.  Patient admitted for further treatment of pneumonia.    Past Medical History:  Past Medical History:   Diagnosis Date   • BPH (benign prostatic hyperplasia)    • GERD (gastroesophageal reflux disease)    • History of transfusion    • Hypertension    • Right bundle branch block    • Spinal stenosis of lumbar region     NUMBNESS/ TINGLING BILAT FEET, PAIN DOWN LEFT LEG      Past Surgical History:  Past Surgical History:   Procedure Laterality Date   • COLONOSCOPY     • FRACTURE SURGERY      LEFT LEG COMPOUND FRACTURE AGE 10   • LUMBAR DISCECTOMY FUSION INSTRUMENTATION N/A 2019    Procedure: L2-S1 Laminectomy and Fusion with Instrumentation with Dr. Luong and Dr. Little;  Surgeon: Alex Little MD;  Location: [facility];  Service: Neurosurgery   • LUMBAR LAMINECTOMY DISCECTOMY DECOMPRESSION N/A 2019    Procedure: L2-S1 laminectomy with a fusion by orthopedics;  Surgeon: Adin Luong MD;  Location: [facility];  Service: Neurosurgery   • TONSILLECTOMY      age 7      Home  Meds:  Medications Prior to Admission   Medication Sig Dispense Refill Last Dose   • acetaminophen (TYLENOL) 325 MG tablet Take 650 mg by mouth Every 6 (Six) Hours As Needed for Mild Pain .   Taking   • calcium carbonate (TUMS) 500 MG chewable tablet Chew 500 mg Every 4 (Four) Hours As Needed for Indigestion or Heartburn.   Taking   • dutasteride (AVODART) 0.5 MG capsule Take 0.5 mg by mouth Daily.   Taking   • gabapentin (NEURONTIN) 600 MG tablet Take 600 mg by mouth 3 (Three) Times a Day.   Taking   • lisinopril (PRINIVIL,ZESTRIL) 5 MG tablet Take 2.5 mg by mouth Daily.   Taking   • Multiple Vitamins-Minerals (MULTIVITAMIN WITH MINERALS) tablet Take 1 tablet by mouth Daily.   Taking   • Omega-3 Fatty Acids (FISH OIL) 1000 MG capsule capsule Take 1,000 mg by mouth Daily With Breakfast. HELD FOR OR   Taking   • omeprazole (priLOSEC) 20 MG capsule Take 20 mg by mouth Daily.   Taking   • oxyCODONE-acetaminophen (PERCOCET) 5-325 MG per tablet As you order it 1 to 2 tablets p.o. every 4 to 6 hours as needed pain 50 tablet 0    • polyethylene glycol (MIRALAX) pack packet Take 17 g by mouth 2 (Two) Times a Day.   Taking   • sennosides-docusate sodium (SENOKOT-S) 8.6-50 MG tablet Take 2 tablets by mouth 2 (Two) Times a Day.   Taking   • tamsulosin (FLOMAX) 0.4 MG capsule 24 hr capsule Take 1 capsule by mouth Every Night.   Taking   • Arginine 1000 MG tablet Take 1,000 mg by mouth Daily. HELD FOR OR   Taking   • ferrous gluconate (FERGON) 324 MG tablet Take 1 tablet by mouth Daily With Breakfast for 30 days. 30 tablet 0 Taking     Current meds  No current facility-administered medications for this encounter.   Allergies:  Allergies   Allergen Reactions   • Sulfa Antibiotics Rash   • Penicillins Other (See Comments)     Tolerated cefazolin August 2019  CAUSED RASH BACK OF THROAT     Immunizations:    There is no immunization history on file for this patient.  Social History:   Social History     Social History Narrative   •  "Not on file     Social History     Socioeconomic History   • Marital status:      Spouse name: Not on file   • Number of children: Not on file   • Years of education: Not on file   • Highest education level: Not on file   Tobacco Use   • Smoking status: Never Smoker   • Smokeless tobacco: Never Used   Substance and Sexual Activity   • Alcohol use: Yes     Comment: 4-5 drinks per week   • Drug use: No   • Sexual activity: Defer       Family History:  Family History   Problem Relation Age of Onset   • Hypertension Father    • Diabetes Sister    • Malig Hyperthermia Neg Hx         Review of Systems  See history of present illness and past medical history.  Patient denies headache, dizziness, syncope, falls, trauma, change in vision, change in hearing, change in taste, changes in weight, changes in appetite, focal weakness, numbness, or paresthesia.  Patient denies chest pain, palpitations, dyspnea, orthopnea, PND, cough, sinus pressure, rhinorrhea, epistaxis, hemoptysis, nausea, vomiting, hematemesis, diarrhea, constipation or hematchezia.  Denies cold or heat intolerance, polydipsia, polyuria, polyphagia. Denies hematuria, pyuria, dysuria, hesitancy, frequency or urgency.     Remainder of ROS is negative.    Objective:  tMax 24 hrs: Temp (24hrs), Av.5 °F (38.1 °C), Min:99.5 °F (37.5 °C), Max:101.4 °F (38.6 °C)    Vitals Ranges:   Temp:  [99.5 °F (37.5 °C)-101.4 °F (38.6 °C)] 99.5 °F (37.5 °C)  Heart Rate:  [] 82  Resp:  [16-18] 16  BP: (100-128)/(61-87) 112/66      Exam:  /66 (BP Location: Right arm, Patient Position: Lying)   Pulse 82   Temp 99.5 °F (37.5 °C) (Oral)   Resp 16   Ht 177.8 cm (70\")   Wt 93.2 kg (205 lb 6.4 oz)   SpO2 95%   BMI 29.47 kg/m²     General Appearance:    Alert, cooperative, no distress, appears stated age   Head:    Normocephalic, without obvious abnormality, atraumatic   Eyes:    PERRL, conjunctiva/corneas clear, EOM's intact, both eyes   Ears:    Normal " external ear canals, both ears   Nose:   Nares normal, septum midline, mucosa normal, no drainage    or sinus tenderness   Throat:   Lips, mucosa, and tongue normal   Neck:   Supple, symmetrical, trachea midline, no adenopathy;     thyroid:  no enlargement/tenderness/nodules; no carotid    bruit or JVD   Back:     Symmetric, no curvature, ROM normal, no CVA tenderness   Lungs:    Crackles bilaterally, respirations unlabored   Chest Wall:    No tenderness or deformity    Heart:    Regular rate and rhythm, S1 and S2 normal, no murmur, rub   or gallop   Abdomen:     Soft, non-tender, bowel sounds active all four quadrants,     no masses, no hepatomegaly, no splenomegaly   Extremities:   Extremities normal, atraumatic, no cyanosis or edema   Pulses:   2+ and symmetric all extremities   Skin:   Skin color, texture, turgor normal, no rashes or lesions   Lymph nodes:   Cervical, supraclavicular, and axillary nodes normal   Neurologic:   CNII-XII intact, normal strength, sensation intact throughout      .    Data Review:  Lab Results (last 72 hours)     Procedure Component Value Units Date/Time    [place] Draw [[id number]] Collected:  09/04/19 1039    Specimen:  Blood Updated:  09/04/19 1145    Narrative:       The following orders were created for panel order [place] Draw.  Procedure                               Abnormality         Status                     ---------                               -----------         ------                     Light Blue Top[[id number]]                                   Final result               Green Top (Gel)[[id number]]                                  Final result               Lavender Top[[id number]]                                     Final result               Gold Top - SST[[id number]]                                   Final result                 Please view results for these tests on the individual orders.    Gold Top - SST [[id number]] Collected:  09/04/19 1039    Specimen:  Blood  Updated:  09/04/19 1145     Extra Tube Hold for add-ons.     Comment: Auto resulted.       Light Blue Top [[id number]] Collected:  09/04/19 1039    Specimen:  Blood Updated:  09/04/19 1145     Extra Tube hold for add-on     Comment: Auto resulted       Green Top (Gel) [[id number]] Collected:  09/04/19 1039    Specimen:  Blood Updated:  09/04/19 1145     Extra Tube Hold for add-ons.     Comment: Auto resulted.       Lavender Top [[id number]] Collected:  09/04/19 1039    Specimen:  Blood Updated:  09/04/19 1145     Extra Tube hold for add-on     Comment: Auto resulted       Urinalysis With Microscopic If Indicated (No Culture) - Urine, Clean Catch [[id number]]  (Abnormal) Collected:  09/04/19 1116    Specimen:  Urine, Clean Catch Updated:  09/04/19 1135     Color, UA Yellow     Appearance, UA Clear     pH, UA 7.0     Specific Gravity, UA 1.027     Glucose, UA Negative     Ketones, UA Trace     Bilirubin, UA Negative     Blood, UA Negative     Protein, UA Trace     Leuk Esterase, UA Trace     Nitrite, UA Negative     Urobilinogen, UA 1.0 E.U./dL    Urinalysis, Microscopic Only - Urine, Clean Catch [[id number]] Collected:  09/04/19 1116    Specimen:  Urine, Clean Catch Updated:  09/04/19 1135     RBC, UA 0-2 /HPF      WBC, UA 0-2 /HPF      Bacteria, UA None Seen /HPF      Squamous Epithelial Cells, UA 0-2 /HPF      Hyaline Casts, UA 3-6 /LPF      Methodology Automated Microscopy    Procalcitonin [[id number]]  (Abnormal) Collected:  09/04/19 1039    Specimen:  Blood Updated:  09/04/19 1129     Procalcitonin 0.35 ng/mL     Narrative:       As a Marker for Sepsis (Non-Neonates):   1. <0.5 ng/mL represents a low risk of severe sepsis and/or septic shock.  1. >2 ng/mL represents a high risk of severe sepsis and/or septic shock.    As a Marker for Lower Respiratory Tract Infections that require antibiotic therapy:  PCT on Admission     Antibiotic Therapy             6-12 Hrs later  > 0.5                Strongly Recommended           "  >0.25 - <0.5         Recommended  0.1 - 0.25           Discouraged                   Remeasure/reassess PCT  <0.1                 Strongly Discouraged          Remeasure/reassess PCT      As 28 day mortality risk marker: \"Change in Procalcitonin Result\" (> 80 % or <=80 %) if Day 0 (or Day 1) and Day 4 values are available. Refer to http://www.[facility]-pct-calculator.com/   Change in PCT <=80 %   A decrease of PCT levels below or equal to 80 % defines a positive change in PCT test result representing a higher risk for 28-day all-cause mortality of patients diagnosed with severe sepsis or septic shock.  Change in PCT > 80 %   A decrease of PCT levels of more than 80 % defines a negative change in PCT result representing a lower risk for 28-day all-cause mortality of patients diagnosed with severe sepsis or septic shock.                Blood Culture - Blood, Arm, Left [[id number]] Collected:  09/04/19 1123    Specimen:  Blood from Arm, Left Updated:  09/04/19 1127    Comprehensive Metabolic Panel [[id number]]  (Abnormal) Collected:  09/04/19 1039    Specimen:  Blood Updated:  09/04/19 1123     Glucose 144 mg/dL      BUN 14 mg/dL      Creatinine 1.07 mg/dL      Sodium 137 mmol/L      Potassium 4.1 mmol/L      Chloride 101 mmol/L      CO2 23.9 mmol/L      Calcium 9.0 mg/dL      Total Protein 6.7 g/dL      Albumin 3.60 g/dL      ALT (SGPT) 9 U/L      AST (SGOT) 14 U/L      Alkaline Phosphatase 120 U/L      Total Bilirubin 0.5 mg/dL      eGFR Non African Amer 67 mL/min/1.73      Globulin 3.1 gm/dL      A/G Ratio 1.2 g/dL      BUN/Creatinine Ratio 13.1     Anion Gap 12.1 mmol/L     Narrative:       GFR Normal >60  Chronic Kidney Disease <60  Kidney Failure <15    Lactic Acid, Plasma [[id number]]  (Normal) Collected:  09/04/19 1039    Specimen:  Blood Updated:  09/04/19 1120     Lactate 1.8 mmol/L     CBC & Differential [[id number]] Collected:  09/04/19 1039    Specimen:  Blood Updated:  09/04/19 1058    Narrative:       The " following orders were created for panel order CBC & Differential.  Procedure                               Abnormality         Status                     ---------                               -----------         ------                     CBC Auto Differential[[id number]]        Abnormal            Final result                 Please view results for these tests on the individual orders.    CBC Auto Differential [[id number]]  (Abnormal) Collected:  09/04/19 1039    Specimen:  Blood Updated:  09/04/19 1058     WBC 11.02 10*3/mm3      RBC 3.74 10*6/mm3      Hemoglobin 11.7 g/dL      Hematocrit 36.6 %      MCV 97.9 fL      MCH 31.3 pg      MCHC 32.0 g/dL      RDW 15.2 %      RDW-SD 54.6 fl      MPV 10.5 fL      Platelets 207 10*3/mm3      Neutrophil % 93.5 %      Lymphocyte % 1.5 %      Monocyte % 3.6 %      Eosinophil % 0.3 %      Basophil % 0.5 %      Immature Grans % 0.6 %      Neutrophils, Absolute 10.30 10*3/mm3      Lymphocytes, Absolute 0.17 10*3/mm3      Monocytes, Absolute 0.40 10*3/mm3      Eosinophils, Absolute 0.03 10*3/mm3      Basophils, Absolute 0.05 10*3/mm3      Immature Grans, Absolute 0.07 10*3/mm3      nRBC 0.0 /100 WBC     Blood Culture - Blood, Arm, Right [[id number]] Collected:  09/04/19 1039    Specimen:  Blood from Arm, Right Updated:  09/04/19 1056                   Imaging Results (all)     Procedure Component Value Units Date/Time    XR Chest 2 View [[id number]] Collected:  09/04/19 1112     Updated:  09/04/19 1119    Narrative:       XR CHEST 2 VW-     Clinical: Fever of unknown origin     FINDINGS: There are calcified bilateral hilar lymph nodes, these are  small. The right lung is clear. Cardiac size upper limits of normal to  mildly enlarged. The mediastinum is satisfactory in appearance.  Left-sided perihilar infiltrate is demonstrated which extends into the  left lower lobe. No pleural effusion or pulmonary edema identified.  There is a typical sclerosis demonstrated through the left  sixth rib. It  is indeterminant. Would recommend follow-up bone scan or CT scan of the  chest for further evaluation.     CONCLUSION:  1. There appears to be left perihilar infiltrate extending into the left  lower lobe, likely pneumonia.  2. Atypical sclerosis of the left sixth rib. Bone scan or computed  tomography recommended as follow-up.     This report was finalized on 9/4/2019 11:15 AM by Dr. Seamus Severino M.D.           Past Medical History:   Diagnosis Date   • BPH (benign prostatic hyperplasia)    • GERD (gastroesophageal reflux disease)    • History of transfusion    • Hypertension    • Right bundle branch block    • Spinal stenosis of lumbar region     NUMBNESS/ TINGLING BILAT FEET, PAIN DOWN LEFT LEG        Assessment:  Active Hospital Problems    Diagnosis  POA   • **Hospital-acquired pneumonia [J18.9]  Yes   • Fever in adult [R50.9]  Unknown   • GERD (gastroesophageal reflux disease) [K21.9]  Yes   • Lumbar spinal stenosis [M48.061]  Yes   • Hypertension [I10]  Yes   • Neuropathy [G62.9]  Yes   • BPH (benign prostatic hyperplasia) [N40.0]  Yes      Resolved Hospital Problems   No resolved problems to display.       Plan:  The patient admitted to hospital continue with broad-spectrum IV antibiotics for healthcare acquired pneumonia.  We will get respiratory culture and respiratory PCR.  We will get some follow-up laboratory data and also do MRSA screen.    Genaro Skaggs MD  9/4/2019  4:26 PM

## 2019-09-04 NOTE — ED PROVIDER NOTES
EMERGENCY DEPARTMENT ENCOUNTER    CHIEF COMPLAINT  Chief Complaint: post-op problem  History given by: patient, family  History limited by: nothing  Room Number: S616/1  PMD: Cheng Ngo MD Drs Werner and Cherise, neurology    HPI:  Pt is a 78 y.o. male who presents complaining of a fever (101F) which started when he woke up this morning. He reports associated chills. Pt called Dr Little, neurology, instructed pt to present to the ED. Pt was discharged on August 15 after a L2-S1 laminectomy with a fusion performed by Dr Luong, neurology. Pt went to physical therapy yesterday and thinks he 'may have overdone it.' He has finished 3 PT sessions. Pt c/o some back pain, noting a recent increase in pain over the past couple of days, with this morning being the most pain he has experienced since his surgery. He also c/o constipation,  increased urinary frequency and dysuria but denies hematuria, CP, SOA, weakness, cough, abd pain, congestion, sore throat, ear pain and all other complaints at this time. His urinary frequency started several days ago; his dysuria began today. Pt is not anticoagulated. He is not a smoker. He takes Percocet as needed for pain. His dosage was 'cut' yesterday. He also takes Gabapentin regularly. He denies being around anyone ill recently.    Duration:  One day  Onset: gradual  Timing: constant  Location: generalized  Radiation: none  Quality: 101F  Intensity/Severity: moderate  Progression: unchanged  Associated Symptoms: chills, increased urinary frequency, dysuria  Aggravating Factors: none  Alleviating Factors: none  Previous Episodes: none  Treatment before arrival: Percocet, Gabapentin    PAST MEDICAL HISTORY  Active Ambulatory Problems     Diagnosis Date Noted   • BPH (benign prostatic hyperplasia) 02/16/2017   • Hypertension 10/09/2017   • Neuropathy 04/10/2017   • Screen for colon cancer 11/06/2017   • Right bundle branch block (RBBB) 12/26/2017   • GERD (gastroesophageal  reflux disease) 12/26/2017   • Lumbar spinal stenosis 12/26/2017   • Spinal stenosis of lumbar region with neurogenic claudication 08/02/2019   • Postoperative hypotension 08/13/2019   • Postoperative anemia due to acute blood loss 08/14/2019   • Thrombocytopenia due to blood loss 08/14/2019     Resolved Ambulatory Problems     Diagnosis Date Noted   • No Resolved Ambulatory Problems     Past Medical History:   Diagnosis Date   • BPH (benign prostatic hyperplasia)    • GERD (gastroesophageal reflux disease)    • History of transfusion    • Hypertension    • Right bundle branch block    • Spinal stenosis of lumbar region        PAST SURGICAL HISTORY  Past Surgical History:   Procedure Laterality Date   • COLONOSCOPY     • FRACTURE SURGERY      LEFT LEG COMPOUND FRACTURE AGE 10   • LUMBAR DISCECTOMY FUSION INSTRUMENTATION N/A 8/12/2019    Procedure: L2-S1 Laminectomy and Fusion with Instrumentation with Dr. Luong and Dr. Little;  Surgeon: Alex Little MD;  Location: [facility];  Service: Neurosurgery   • LUMBAR LAMINECTOMY DISCECTOMY DECOMPRESSION N/A 8/12/2019    Procedure: L2-S1 laminectomy with a fusion by orthopedics;  Surgeon: Adin Luong MD;  Location: [facility];  Service: Neurosurgery   • TONSILLECTOMY      age 7       FAMILY HISTORY  Family History   Problem Relation Age of Onset   • Hypertension Father    • Diabetes Sister    • Malig Hyperthermia Neg Hx        SOCIAL HISTORY  Social History     Socioeconomic History   • Marital status:      Spouse name: Not on file   • Number of children: Not on file   • Years of education: Not on file   • Highest education level: Not on file   Tobacco Use   • Smoking status: Never Smoker   • Smokeless tobacco: Never Used   Substance and Sexual Activity   • Alcohol use: Yes     Comment: 4-5 drinks per week   • Drug use: No   • Sexual activity: Defer       ALLERGIES  Sulfa antibiotics and Penicillins    REVIEW OF SYSTEMS  Review of Systems    Constitutional: Positive for chills and fever. Negative for activity change and appetite change.   HENT: Negative for congestion and sore throat.    Eyes: Negative.    Respiratory: Negative for cough and shortness of breath.    Cardiovascular: Negative for chest pain and leg swelling.   Gastrointestinal: Positive for constipation. Negative for abdominal pain, diarrhea and vomiting.   Endocrine: Negative.    Genitourinary: Positive for dysuria and frequency. Negative for decreased urine volume, enuresis and hematuria.   Musculoskeletal: Negative for neck pain.   Skin: Negative for rash and wound.   Allergic/Immunologic: Negative.    Neurological: Negative for weakness, numbness and headaches.   Hematological: Negative.    Psychiatric/Behavioral: Negative.    All other systems reviewed and are negative.      PHYSICAL EXAM  ED Triage Vitals [09/04/19 0958]   Temp Heart Rate Resp BP SpO2   (!) 101.4 °F (38.6 °C) 120 18 -- 93 %       Physical Exam   Constitutional: He is oriented to person, place, and time. No distress.   HENT:   Head: Normocephalic and atraumatic.   Eyes: EOM are normal. Pupils are equal, round, and reactive to light.   Neck: Normal range of motion. Neck supple.   Cardiovascular: Normal rate, regular rhythm and normal heart sounds.   Pulmonary/Chest: Effort normal and breath sounds normal. No respiratory distress.   Abdominal: Soft. There is no tenderness. There is no rebound and no guarding.   Musculoskeletal: Normal range of motion. He exhibits no edema.   Neurological: He is alert and oriented to person, place, and time. He has normal sensation and normal strength.   Skin: Skin is warm and dry.   Well approximately vertically oriented linear incision over lumbar spine with skin glue closure. No dehiscence of incision. No induration. No drainage. No tenderness.    Psychiatric: Mood and affect normal.   Nursing note and vitals reviewed.      LAB RESULTS  Lab Results (last 24 hours)     Procedure  Component Value Units Date/Time    CBC & Differential [[id number]] Collected:  09/04/19 1039    Specimen:  Blood Updated:  09/04/19 1058    Narrative:       The following orders were created for panel order CBC & Differential.  Procedure                               Abnormality         Status                     ---------                               -----------         ------                     CBC Auto Differential[[id number]]        Abnormal            Final result                 Please view results for these tests on the individual orders.    Comprehensive Metabolic Panel [[id number]]  (Abnormal) Collected:  09/04/19 1039    Specimen:  Blood Updated:  09/04/19 1123     Glucose 144 mg/dL      BUN 14 mg/dL      Creatinine 1.07 mg/dL      Sodium 137 mmol/L      Potassium 4.1 mmol/L      Chloride 101 mmol/L      CO2 23.9 mmol/L      Calcium 9.0 mg/dL      Total Protein 6.7 g/dL      Albumin 3.60 g/dL      ALT (SGPT) 9 U/L      AST (SGOT) 14 U/L      Alkaline Phosphatase 120 U/L      Total Bilirubin 0.5 mg/dL      eGFR Non African Amer 67 mL/min/1.73      Globulin 3.1 gm/dL      A/G Ratio 1.2 g/dL      BUN/Creatinine Ratio 13.1     Anion Gap 12.1 mmol/L     Narrative:       GFR Normal >60  Chronic Kidney Disease <60  Kidney Failure <15    Blood Culture - Blood, Arm, Right [[id number]] Collected:  09/04/19 1039    Specimen:  Blood from Arm, Right Updated:  09/04/19 1056    Procalcitonin [[id number]]  (Abnormal) Collected:  09/04/19 1039    Specimen:  Blood Updated:  09/04/19 1129     Procalcitonin 0.35 ng/mL     Narrative:       As a Marker for Sepsis (Non-Neonates):   1. <0.5 ng/mL represents a low risk of severe sepsis and/or septic shock.  1. >2 ng/mL represents a high risk of severe sepsis and/or septic shock.    As a Marker for Lower Respiratory Tract Infections that require antibiotic therapy:  PCT on Admission     Antibiotic Therapy             6-12 Hrs later  > 0.5                Strongly Recommended           "  >0.25 - <0.5         Recommended  0.1 - 0.25           Discouraged                   Remeasure/reassess PCT  <0.1                 Strongly Discouraged          Remeasure/reassess PCT      As 28 day mortality risk marker: \"Change in Procalcitonin Result\" (> 80 % or <=80 %) if Day 0 (or Day 1) and Day 4 values are available. Refer to http://www.[facility]-pct-calculator.com/   Change in PCT <=80 %   A decrease of PCT levels below or equal to 80 % defines a positive change in PCT test result representing a higher risk for 28-day all-cause mortality of patients diagnosed with severe sepsis or septic shock.  Change in PCT > 80 %   A decrease of PCT levels of more than 80 % defines a negative change in PCT result representing a lower risk for 28-day all-cause mortality of patients diagnosed with severe sepsis or septic shock.                Lactic Acid, Plasma [[id number]]  (Normal) Collected:  09/04/19 1039    Specimen:  Blood Updated:  09/04/19 1120     Lactate 1.8 mmol/L     CBC Auto Differential [[id number]]  (Abnormal) Collected:  09/04/19 1039    Specimen:  Blood Updated:  09/04/19 1058     WBC 11.02 10*3/mm3      RBC 3.74 10*6/mm3      Hemoglobin 11.7 g/dL      Hematocrit 36.6 %      MCV 97.9 fL      MCH 31.3 pg      MCHC 32.0 g/dL      RDW 15.2 %      RDW-SD 54.6 fl      MPV 10.5 fL      Platelets 207 10*3/mm3      Neutrophil % 93.5 %      Lymphocyte % 1.5 %      Monocyte % 3.6 %      Eosinophil % 0.3 %      Basophil % 0.5 %      Immature Grans % 0.6 %      Neutrophils, Absolute 10.30 10*3/mm3      Lymphocytes, Absolute 0.17 10*3/mm3      Monocytes, Absolute 0.40 10*3/mm3      Eosinophils, Absolute 0.03 10*3/mm3      Basophils, Absolute 0.05 10*3/mm3      Immature Grans, Absolute 0.07 10*3/mm3      nRBC 0.0 /100 WBC     Urinalysis With Microscopic If Indicated (No Culture) - Urine, Clean Catch [[id number]]  (Abnormal) Collected:  09/04/19 1116    Specimen:  Urine, Clean Catch Updated:  09/04/19 1135     Color, UA " Yellow     Appearance, UA Clear     pH, UA 7.0     Specific Gravity, UA 1.027     Glucose, UA Negative     Ketones, UA Trace     Bilirubin, UA Negative     Blood, UA Negative     Protein, UA Trace     Leuk Esterase, UA Trace     Nitrite, UA Negative     Urobilinogen, UA 1.0 E.U./dL    Urinalysis, Microscopic Only - Urine, Clean Catch [[id number]] Collected:  09/04/19 1116    Specimen:  Urine, Clean Catch Updated:  09/04/19 1135     RBC, UA 0-2 /HPF      WBC, UA 0-2 /HPF      Bacteria, UA None Seen /HPF      Squamous Epithelial Cells, UA 0-2 /HPF      Hyaline Casts, UA 3-6 /LPF      Methodology Automated Microscopy    Blood Culture - Blood, Arm, Left [[id number]] Collected:  09/04/19 1123    Specimen:  Blood from Arm, Left Updated:  09/04/19 1127          I ordered the above labs and reviewed the results    RADIOLOGY  XR Chest 2 View   Final Result         Reviewed CXR.     CONCLUSION:  1. There appears to be left perihilar infiltrate extending into the left lower lobe, likely pneumonia.  2. Atypical sclerosis of the left sixth rib. Bone scan or computed tomography recommended as follow-up.    Independently viewed by me. Interpreted by Dr Severino, radiologist.      I ordered the above noted radiological studies. Interpreted by radiologist. Reviewed by me in PACS.         PROGRESS AND CONSULTS  Final Diagnosis: as of Sep 04 1712   Hospital-acquired pneumonia   Fever in adult     1200. Discussed case with Dr Ji. After a bedside evaluation, he agrees with the plan of care.     1140. Placed call to [facility].     1142. Discussed case with Dr Ji. After a bedside evaluation, he agrees with the plan of care.     1234. BP- 111/61 HR- 120 Temp- (!) 101.4 °F (38.6 °C) (Tympanic) O2 sat- 93%  Rechecked the patient who is in NAD and is resting. Updated patient on pln for admission for IV abx. Pt understands and agrees with the plan, all questions answered.    1236. Discussed case with Dr Skaggs, [facility], who agrees to admit the  "patient.       MEDICAL DECISION MAKING  Results were reviewed/discussed with the patient and they were also made aware of online access. Pt also made aware that some labs, such as cultures, will not be resulted during ER visit and follow up with PMD is necessary.     MDM  Number of Diagnoses or Management Options  Fever in adult:   Hospital-acquired pneumonia:      Amount and/or Complexity of Data Reviewed  Clinical lab tests: reviewed and ordered (procalcitonin 0.35)  Tests in the radiology section of CPT®: reviewed and ordered (left perihilar infiltrate seen on CXR)  Decide to obtain previous medical records or to obtain history from someone other than the patient: yes (Epic)  Review and summarize past medical records: yes (Admission for L2-S1 laminectomy with a fusion 8/12/2019 as follows, \"78 y.o. male admitted to [facility] [place] to services of Adin Luong MD with Spinal stenosis of lumbar region with neurogenic claudication; Spinal stenosis of lumbar region with neurogenic claudication on 8/12/2019 and underwent L2-S1 laminectomy with a fusion by orthopedics  Per Adin Luong MD. Antibiotic and VTE prophylaxis were per [facility]IP protocols.  The patient was admitted to the floor where IV and/or oral pain medications were administered for postoperative pain.  At discharge the incisional pain was tolerable and preop neurologic function was intact.  The dressing was dry and the wound was clean.\")  Discuss the patient with other providers: yes (Dr Skaggs, [facility])    Patient Progress  Patient progress: stable         DIAGNOSIS  Final diagnoses:   Hospital-acquired pneumonia   Fever in adult       DISPOSITION  ADMISSION    Discussed treatment plan and reason for admission with pt/family and admitting physician.  Pt/family voiced understanding of the plan for admission for further testing/treatment as needed.       Latest Documented Vital Signs:  As of 5:12 PM  BP- 112/66 HR- 82 Temp- 99.5 °F (37.5 °C) (Oral) O2 " sat- 95%    --  Documentation assistance provided by mahnaz Thomas for CARMEN Paez.  Information recorded by the scribe was done at my direction and has been verified and validated by me.       Adrianna Thomas  09/04/19 3620       Tiffany Paez APRN  09/04/19 0305

## 2019-09-04 NOTE — ED PROVIDER NOTES
MD ATTESTATION NOTE    Pt presents to the ED post op from back surgery, complaining of fever (T temp 103, 101.4 at triage), chills, and dysuria.    Reviewed pt's lab and imaging studies, including XR Chest showing pneumonia.     On exam, the pt is CTAB and respiration is unlabored.    I agree with the plan to admit pt for IV Abx.    The RADHA and I have discussed this patient's history, physical exam, and treatment plan.  I have reviewed the documentation and personally had a face to face interaction with the patient. I affirm the documentation and agree with the treatment and plan.  The attached note describes my personal findings.    Documentation assistance provided by mahnaz Mathew for Dr. Ji.  Information recorded by the scribe was done at my direction and has been verified and validated by me.     Cara Mathew  09/04/19 1202       Cheng Ji MD  09/04/19 1205

## 2019-09-04 NOTE — TELEPHONE ENCOUNTER
I spoke with the patient and his wife.  It actually reached 103.  He has had no urinary symptoms shortness of breath but nor has his back pain increased, drained or shown any other signs of infection.  This happened overnight, and may not reflect a problem with the back at all and as he looks quite good when I saw him last week I asked him to go ahead and go to the emergency room where a comprehensive evaluation can be performed so as not to miss anything else.  They are going to the ER at [facility].

## 2019-09-04 NOTE — ED TRIAGE NOTES
Patient presents to er via private vehicle from home.  Patient reports fevers highest of 103.  Patient took 325 mg of tylenol around 0900.

## 2019-09-05 VITALS
DIASTOLIC BLOOD PRESSURE: 76 MMHG | SYSTOLIC BLOOD PRESSURE: 121 MMHG | HEIGHT: 70 IN | BODY MASS INDEX: 29.41 KG/M2 | WEIGHT: 205.4 LBS | HEART RATE: 83 BPM | OXYGEN SATURATION: 96 % | RESPIRATION RATE: 18 BRPM | TEMPERATURE: 98.4 F

## 2019-09-05 LAB
ANION GAP SERPL CALCULATED.3IONS-SCNC: 11.4 MMOL/L (ref 5–15)
B PARAPERT DNA SPEC QL NAA+PROBE: NOT DETECTED
B PERT DNA SPEC QL NAA+PROBE: NOT DETECTED
BASOPHILS # BLD AUTO: 0.06 10*3/MM3 (ref 0–0.2)
BASOPHILS NFR BLD AUTO: 0.6 % (ref 0–1.5)
BUN BLD-MCNC: 13 MG/DL (ref 8–23)
BUN/CREAT SERPL: 14.8 (ref 7–25)
C PNEUM DNA NPH QL NAA+NON-PROBE: NOT DETECTED
CALCIUM SPEC-SCNC: 8.7 MG/DL (ref 8.6–10.5)
CHLORIDE SERPL-SCNC: 97 MMOL/L (ref 98–107)
CO2 SERPL-SCNC: 23.6 MMOL/L (ref 22–29)
CREAT BLD-MCNC: 0.88 MG/DL (ref 0.76–1.27)
DEPRECATED RDW RBC AUTO: 54.7 FL (ref 37–54)
EOSINOPHIL # BLD AUTO: 0.11 10*3/MM3 (ref 0–0.4)
EOSINOPHIL NFR BLD AUTO: 1.1 % (ref 0.3–6.2)
ERYTHROCYTE [DISTWIDTH] IN BLOOD BY AUTOMATED COUNT: 15 % (ref 12.3–15.4)
FLUAV H1 2009 PAND RNA NPH QL NAA+PROBE: NOT DETECTED
FLUAV H1 HA GENE NPH QL NAA+PROBE: NOT DETECTED
FLUAV H3 RNA NPH QL NAA+PROBE: NOT DETECTED
FLUAV SUBTYP SPEC NAA+PROBE: NOT DETECTED
FLUBV RNA ISLT QL NAA+PROBE: NOT DETECTED
GFR SERPL CREATININE-BSD FRML MDRD: 84 ML/MIN/1.73
GLUCOSE BLD-MCNC: 109 MG/DL (ref 65–99)
HADV DNA SPEC NAA+PROBE: NOT DETECTED
HCOV 229E RNA SPEC QL NAA+PROBE: NOT DETECTED
HCOV HKU1 RNA SPEC QL NAA+PROBE: NOT DETECTED
HCOV NL63 RNA SPEC QL NAA+PROBE: NOT DETECTED
HCOV OC43 RNA SPEC QL NAA+PROBE: NOT DETECTED
HCT VFR BLD AUTO: 34.5 % (ref 37.5–51)
HGB BLD-MCNC: 10.8 G/DL (ref 13–17.7)
HMPV RNA NPH QL NAA+NON-PROBE: NOT DETECTED
HPIV1 RNA SPEC QL NAA+PROBE: NOT DETECTED
HPIV2 RNA SPEC QL NAA+PROBE: NOT DETECTED
HPIV3 RNA NPH QL NAA+PROBE: NOT DETECTED
HPIV4 P GENE NPH QL NAA+PROBE: NOT DETECTED
IMM GRANULOCYTES # BLD AUTO: 0.06 10*3/MM3 (ref 0–0.05)
IMM GRANULOCYTES NFR BLD AUTO: 0.6 % (ref 0–0.5)
IRON 24H UR-MRATE: 22 MCG/DL (ref 59–158)
IRON SATN MFR SERPL: 9 % (ref 20–50)
LYMPHOCYTES # BLD AUTO: 0.5 10*3/MM3 (ref 0.7–3.1)
LYMPHOCYTES NFR BLD AUTO: 5 % (ref 19.6–45.3)
M PNEUMO IGG SER IA-ACNC: NOT DETECTED
MCH RBC QN AUTO: 31.2 PG (ref 26.6–33)
MCHC RBC AUTO-ENTMCNC: 31.3 G/DL (ref 31.5–35.7)
MCV RBC AUTO: 99.7 FL (ref 79–97)
MONOCYTES # BLD AUTO: 0.49 10*3/MM3 (ref 0.1–0.9)
MONOCYTES NFR BLD AUTO: 4.9 % (ref 5–12)
MRSA DNA SPEC QL NAA+PROBE: NORMAL
NEUTROPHILS # BLD AUTO: 8.82 10*3/MM3 (ref 1.7–7)
NEUTROPHILS NFR BLD AUTO: 87.8 % (ref 42.7–76)
NRBC BLD AUTO-RTO: 0 /100 WBC (ref 0–0.2)
PLATELET # BLD AUTO: 181 10*3/MM3 (ref 140–450)
PMV BLD AUTO: 11.1 FL (ref 6–12)
POTASSIUM BLD-SCNC: 4 MMOL/L (ref 3.5–5.2)
RBC # BLD AUTO: 3.46 10*6/MM3 (ref 4.14–5.8)
RHINOVIRUS RNA SPEC NAA+PROBE: NOT DETECTED
RSV RNA NPH QL NAA+NON-PROBE: NOT DETECTED
SODIUM BLD-SCNC: 132 MMOL/L (ref 136–145)
TIBC SERPL-MCNC: 255 MCG/DL (ref 298–536)
TRANSFERRIN SERPL-MCNC: 171 MG/DL (ref 200–360)
WBC NRBC COR # BLD: 10.04 10*3/MM3 (ref 3.4–10.8)

## 2019-09-05 PROCEDURE — G0378 HOSPITAL OBSERVATION PER HR: HCPCS

## 2019-09-05 PROCEDURE — 25010000002 IRON SUCROSE PER 1 MG: Performed by: HOSPITALIST

## 2019-09-05 PROCEDURE — 0100U HC BIOFIRE FILMARRAY RESP PANEL 2: CPT | Performed by: HOSPITALIST

## 2019-09-05 PROCEDURE — 83540 ASSAY OF IRON: CPT | Performed by: HOSPITALIST

## 2019-09-05 PROCEDURE — 87641 MR-STAPH DNA AMP PROBE: CPT | Performed by: HOSPITALIST

## 2019-09-05 PROCEDURE — 94799 UNLISTED PULMONARY SVC/PX: CPT

## 2019-09-05 PROCEDURE — 80048 BASIC METABOLIC PNL TOTAL CA: CPT | Performed by: HOSPITALIST

## 2019-09-05 PROCEDURE — 85025 COMPLETE CBC W/AUTO DIFF WBC: CPT | Performed by: HOSPITALIST

## 2019-09-05 PROCEDURE — 25010000002 CEFEPIME PER 500 MG: Performed by: HOSPITALIST

## 2019-09-05 PROCEDURE — 84466 ASSAY OF TRANSFERRIN: CPT | Performed by: HOSPITALIST

## 2019-09-05 PROCEDURE — 25010000002 VANCOMYCIN 10 G RECONSTITUTED SOLUTION: Performed by: HOSPITALIST

## 2019-09-05 RX ORDER — CEFDINIR 300 MG/1
300 CAPSULE ORAL 2 TIMES DAILY
Qty: 14 CAPSULE | Refills: 0 | Status: SHIPPED | OUTPATIENT
Start: 2019-09-05 | End: 2019-09-12

## 2019-09-05 RX ADMIN — GABAPENTIN 100 MG: 100 CAPSULE ORAL at 13:29

## 2019-09-05 RX ADMIN — SODIUM CHLORIDE, PRESERVATIVE FREE 10 ML: 5 INJECTION INTRAVENOUS at 08:51

## 2019-09-05 RX ADMIN — VANCOMYCIN HYDROCHLORIDE 1250 MG: 10 INJECTION, POWDER, LYOPHILIZED, FOR SOLUTION INTRAVENOUS at 02:02

## 2019-09-05 RX ADMIN — LISINOPRIL 2.5 MG: 2.5 TABLET ORAL at 08:51

## 2019-09-05 RX ADMIN — FINASTERIDE 5 MG: 5 TABLET, FILM COATED ORAL at 08:51

## 2019-09-05 RX ADMIN — PANTOPRAZOLE SODIUM 40 MG: 40 TABLET, DELAYED RELEASE ORAL at 06:28

## 2019-09-05 RX ADMIN — OXYCODONE HYDROCHLORIDE AND ACETAMINOPHEN 1 TABLET: 5; 325 TABLET ORAL at 08:58

## 2019-09-05 RX ADMIN — IRON SUCROSE 300 MG: 20 INJECTION, SOLUTION INTRAVENOUS at 12:23

## 2019-09-05 RX ADMIN — GABAPENTIN 100 MG: 100 CAPSULE ORAL at 06:28

## 2019-09-05 RX ADMIN — CEFEPIME HYDROCHLORIDE 2 G: 2 INJECTION, POWDER, FOR SOLUTION INTRAVENOUS at 04:20

## 2019-09-05 NOTE — PROGRESS NOTES
"Pharmacokinetic Evaluation: Vancomycin IV  Patient: CHRIS Mccoy Jr.  Admission Date: 904  MRN: [id number]  : 1940    Day of vancomycin therapy: 2  Consult for Dr. Skaggs  Treating: PNA  Goal trough: 15-20 mcg/mL    Current regimen: Vancomycin 1250 mg IV q12hrs  Other antimicrobials: Cefepime 2 gm IV q8hrs EI    Relevant clinical data and objective history reviewed:  78 y.o. male 177.8 cm (70\") 93.2 kg (205 lb 6.4 oz)  Body mass index is 29.47 kg/m².  Results from last 7 days   Lab Units 19  0706 19  1039   CREATININE mg/dL 0.88 1.07     Estimated Creatinine Clearance: 80.6 mL/min (by C-G formula based on SCr of 0.88 mg/dL).    Lab Results   Component Value Date    WBC 10.04 2019    WBC 11.02 (H) 2019     Temp:  [98.2 °F (36.8 °C)-99.5 °F (37.5 °C)] 98.4 °F (36.9 °C)  Heart Rate:  [76-86] 83  Resp:  [16-18] 18  BP: (100-121)/(59-76) 121/76    Intake/Output Summary (Last 24 hours) at 2019 1214  Last data filed at 2019 0848  Gross per 24 hour   Intake 1241 ml   Output 1750 ml   Net -509 ml     Baseline cultures/labs/radiology:     Lactate: 1.8   Procalcitonin: 0.35    Radiology:    CXR: There appears to be left perihilar infiltrate extending into the left  lower lobe, likely pneumonia.    Cultures:    Blood cx x2: NG x 24hrs   MRSA Screen, PCR: Neg   RVP: None detected    Assessment/Plan:   PMH: BPH, GERD, transfusion, Hypertension, Right bundle branch block, and Spinal stenosis of lumbar region.     HPI: Recent admission from 19 - 8/15/19 for L2-S1 laminectomy with fusion, admitted with fevers and chills    1. Vancomycin trough due  @ 13:30  2. Scr improved  3. MRSA Screen negative, suggest D/C Vanc IV    Vancomycin IV Dosing & Levels History:   13:27 1750 mg x1   02:02, 14:00 (due) 1250 mg q12hrs    Thank you for your consult,    Jessie Tapia ELENITA                    "

## 2019-09-05 NOTE — PROGRESS NOTES
Discharge Planning Assessment  [facility]     Patient Name: CHRIS Mccoy Jr.  MRN: [id number]  Today's Date: 9/5/2019    Admit Date: 9/4/2019    Discharge Needs Assessment     Row Name 09/05/19 0857       Living Environment    Lives With  spouse    Current Living Arrangements  home/apartment/condo    Primary Care Provided by  self    Provides Primary Care For  no one    Family Caregiver if Needed  spouse    Family Caregiver Names  Wife Satnam [phone]    Quality of Family Relationships  helpful    Able to Return to Prior Arrangements  yes       Resource/Environmental Concerns    Resource/Environmental Concerns  none       Transition Planning    Patient/Family Anticipates Transition to  home with family    Transportation Anticipated  family or friend will provide       Discharge Needs Assessment    Readmission Within the Last 30 Days  other (see comments) laminectomy/fusion 8/2019.  Now with [facility]    Concerns to be Addressed  denies needs/concerns at this time    Equipment Currently Used at Home  rollator;wheelchair;shower chair;hospital bed    Anticipated Changes Related to Illness  none    Equipment Needed After Discharge  none    Outpatient/Agency/Support Group Needs  outpatient therapy        Discharge Plan     Row Name 09/05/19 0859       Plan    Plan  Return home with spouse    Patient/Family in Agreement with Plan  yes    Plan Comments  Spoke with patient at bedside.  Patient lives with wife Satnam [phone], is IADL.  He has been using a walker, has W/C, hospital bed - since recent back surgery.  He went to [facility] for rehab and now is going to OP PT at [facility].  Patient plans to return home at [place] and continue OP PT.  CCP will follow as needed.  BHumeniuk RN               Demographic Summary     Row Name 09/05/19 0856       General Information    Admission Type  observation    Arrived From  home    Referral Source  admission list    Reason for Consult  discharge planning    Preferred Language  English      Used During This Interaction  no        Functional Status     Row Name 09/05/19 0857       Functional Status    Usual Activity Tolerance  moderate    Current Activity Tolerance  moderate       Functional Status, IADL    Medications  independent    Meal Preparation  assistive person Wife    Housekeeping  assistive person    Laundry  assistive person    Shopping  assistive person       Mental Status    General Appearance WDL  WDL       Mental Status Summary    Recent Changes in Mental Status/Cognitive Functioning  no changes                Becky S. Humeniuk, RN

## 2019-09-05 NOTE — DISCHARGE SUMMARY
PHYSICIAN DISCHARGE SUMMARY                                                                        [facility]    Patient Identification:  Name: CRHIS Mccoy Jr.  Age: 78 y.o.  Sex: male  :  1940  MRN: [id number]  Primary Care Physician: Cheng Ngo MD    Admit date: 2019  Discharge date and time:2019  Discharged Condition: good    Discharge Diagnoses:  Active Hospital Problems    Diagnosis  POA   • **Hospital-acquired pneumonia [J18.9]  Yes   • Fever in adult [R50.9]  Unknown   • GERD (gastroesophageal reflux disease) [K21.9]  Yes   • Lumbar spinal stenosis [M48.061]  Yes   • Hypertension [I10]  Yes   • Neuropathy [G62.9]  Yes   • BPH (benign prostatic hyperplasia) [N40.0]  Yes      Resolved Hospital Problems   No resolved problems to display.          PMHX:   Past Medical History:   Diagnosis Date   • BPH (benign prostatic hyperplasia)    • GERD (gastroesophageal reflux disease)    • History of transfusion    • Hypertension    • Right bundle branch block    • Spinal stenosis of lumbar region     NUMBNESS/ TINGLING BILAT FEET, PAIN DOWN LEFT LEG      PSHX:   Past Surgical History:   Procedure Laterality Date   • COLONOSCOPY     • FRACTURE SURGERY      LEFT LEG COMPOUND FRACTURE AGE 10   • LUMBAR DISCECTOMY FUSION INSTRUMENTATION N/A 2019    Procedure: L2-S1 Laminectomy and Fusion with Instrumentation with Dr. Luong and Dr. Little;  Surgeon: Alex Little MD;  Location: [facility];  Service: Neurosurgery   • LUMBAR LAMINECTOMY DISCECTOMY DECOMPRESSION N/A 2019    Procedure: L2-S1 laminectomy with a fusion by orthopedics;  Surgeon: Adin Luong MD;  Location: [facility];  Service: Neurosurgery   • TONSILLECTOMY      age 7       Hospital Course: CHRIS Mccoy Jr.  is a 78-year-old white male with BPH, GERD, hypertension, right bundle branch block and recent surgery for lumbar spinal  stenosis who was admitted with history of waking up on the day of admission with fever and chills. He is not really been coughing much. He had back surgery recently and his wounds healing nicely without any redness or drainage. He has not had any nausea or vomiting. He denied having any chest pain or shortness of air. The patient was evaluated in the ER and chest x-ray showed infiltrate consistent with pneumonia. Patient was started on broad-spectrum IV antibiotics for healthcare acquired pneumonia. Patient admitted for further treatment of pneumonia.        Patient was admitted to the hospital and treated with IV antibiotics for pneumonia.  He was feeling much better after being in the hospital overnight and looked well enough to go home.  His cultures were negative and MRSA PCR screen was negative.  We will give him another week of oral antibiotics and follow-up with his primary care doctor in 1 week for continuing care.  He will need follow-up chest x-ray in about a month to ensure that his pneumonia is clearing.    Consults:     Consults     Date and Time Order Name Status Description    9/4/2019 1140 [facility] (on-call MD unless specified) Details Completed         Results from last 7 days   Lab Units 09/05/19  0706   WBC 10*3/mm3 10.04   HEMOGLOBIN g/dL 10.8*   HEMATOCRIT % 34.5*   PLATELETS 10*3/mm3 181     Results from last 7 days   Lab Units 09/05/19  0706   SODIUM mmol/L 132*   POTASSIUM mmol/L 4.0   CHLORIDE mmol/L 97*   CO2 mmol/L 23.6   BUN mg/dL 13   CREATININE mg/dL 0.88   GLUCOSE mg/dL 109*   CALCIUM mg/dL 8.7     Significant Diagnostic Studies:   Lab Results   Component Value Date    WBC 10.04 09/05/2019    HGB 10.8 (L) 09/05/2019    HCT 34.5 (L) 09/05/2019     09/05/2019     Lab Results   Component Value Date     (L) 09/05/2019    K 4.0 09/05/2019    CL 97 (L) 09/05/2019    CO2 23.6 09/05/2019    BUN 13 09/05/2019    CREATININE 0.88 09/05/2019    GLUCOSE 109 (H) 09/05/2019     Lab Results    Component Value Date    CALCIUM 8.7 09/05/2019     Lab Results   Component Value Date    AST 14 09/04/2019    ALT 9 09/04/2019    ALKPHOS 120 (H) 09/04/2019     No results found for: APTT, INR  Lab Results   Component Value Date    COLORU Yellow 09/04/2019    CLARITYU Clear 09/04/2019    PHUR 7.0 09/04/2019    GLUCOSEU Negative 09/04/2019    KETONESU Trace (A) 09/04/2019    BLOODU Negative 09/04/2019    LEUKOCYTESUR Trace (A) 09/04/2019    BILIRUBINUR Negative 09/04/2019    UROBILINOGEN 1.0 E.U./dL 09/04/2019    RBCUA 0-2 09/04/2019    WBCUA 0-2 09/04/2019    BACTERIA None Seen 09/04/2019     No results found for: TROPONINT, TROPONINI, BNP  No components found for: HGBA1C;2  No components found for: TSH;2  Imaging Results (all)     Procedure Component Value Units Date/Time    XR Chest 2 View [[id number]] Collected:  09/04/19 1112     Updated:  09/04/19 1119    Narrative:       XR CHEST 2 VW-     Clinical: Fever of unknown origin     FINDINGS: There are calcified bilateral hilar lymph nodes, these are  small. The right lung is clear. Cardiac size upper limits of normal to  mildly enlarged. The mediastinum is satisfactory in appearance.  Left-sided perihilar infiltrate is demonstrated which extends into the  left lower lobe. No pleural effusion or pulmonary edema identified.  There is a typical sclerosis demonstrated through the left sixth rib. It  is indeterminant. Would recommend follow-up bone scan or CT scan of the  chest for further evaluation.     CONCLUSION:  1. There appears to be left perihilar infiltrate extending into the left  lower lobe, likely pneumonia.  2. Atypical sclerosis of the left sixth rib. Bone scan or computed  tomography recommended as follow-up.     This report was finalized on 9/4/2019 11:15 AM by Dr. Seamus Severino M.D.           Lab Results (last 7 days)     Procedure Component Value Units Date/Time    Blood Culture - Blood, Arm, Left [[id number]] Collected:  09/04/19 1123    Specimen:   Blood from Arm, Left Updated:  09/05/19 1130     Blood Culture No growth at 24 hours    Blood Culture - Blood, Arm, Right [[id number]] Collected:  09/04/19 1039    Specimen:  Blood from Arm, Right Updated:  09/05/19 1100     Blood Culture No growth at 24 hours    Basic Metabolic Panel [[id number]]  (Abnormal) Collected:  09/05/19 0706    Specimen:  Blood Updated:  09/05/19 0835     Glucose 109 mg/dL      BUN 13 mg/dL      Creatinine 0.88 mg/dL      Sodium 132 mmol/L      Potassium 4.0 mmol/L      Chloride 97 mmol/L      CO2 23.6 mmol/L      Calcium 8.7 mg/dL      eGFR Non African Amer 84 mL/min/1.73      BUN/Creatinine Ratio 14.8     Anion Gap 11.4 mmol/L     Narrative:       GFR Normal >60  Chronic Kidney Disease <60  Kidney Failure <15    Iron Profile [[id number]]  (Abnormal) Collected:  09/05/19 0706    Specimen:  Blood Updated:  09/05/19 0835     Iron 22 mcg/dL      Iron Saturation 9 %      Transferrin 171 mg/dL      TIBC 255 mcg/dL     MRSA Screen, PCR - Swab, Nares [[id number]]  (Normal) Collected:  09/05/19 0207    Specimen:  Swab from Nares Updated:  09/05/19 0826     MRSA PCR No MRSA Detected    CBC & Differential [[id number]] Collected:  09/05/19 0706    Specimen:  Blood Updated:  09/05/19 0808    Narrative:       The following orders were created for panel order CBC & Differential.  Procedure                               Abnormality         Status                     ---------                               -----------         ------                     CBC Auto Differential[[id number]]        Abnormal            Final result                 Please view results for these tests on the individual orders.    CBC Auto Differential [[id number]]  (Abnormal) Collected:  09/05/19 0706    Specimen:  Blood Updated:  09/05/19 0808     WBC 10.04 10*3/mm3      RBC 3.46 10*6/mm3      Hemoglobin 10.8 g/dL      Hematocrit 34.5 %      MCV 99.7 fL      MCH 31.2 pg      MCHC 31.3 g/dL      RDW 15.0 %      RDW-SD 54.7 fl      MPV  11.1 fL      Platelets 181 10*3/mm3      Neutrophil % 87.8 %      Lymphocyte % 5.0 %      Monocyte % 4.9 %      Eosinophil % 1.1 %      Basophil % 0.6 %      Immature Grans % 0.6 %      Neutrophils, Absolute 8.82 10*3/mm3      Lymphocytes, Absolute 0.50 10*3/mm3      Monocytes, Absolute 0.49 10*3/mm3      Eosinophils, Absolute 0.11 10*3/mm3      Basophils, Absolute 0.06 10*3/mm3      Immature Grans, Absolute 0.06 10*3/mm3      nRBC 0.0 /100 WBC     Respiratory Panel, PCR - Swab, Nasopharynx [[id number]]  (Normal) Collected:  09/05/19 0207    Specimen:  Swab from Nasopharynx Updated:  09/05/19 0327     ADENOVIRUS, PCR Not Detected     Coronavirus 229E Not Detected     Coronavirus HKU1 Not Detected     Coronavirus NL63 Not Detected     Coronavirus OC43 Not Detected     Human Metapneumovirus Not Detected     Human Rhinovirus/Enterovirus Not Detected     Influenza B PCR Not Detected     Parainfluenza Virus 1 Not Detected     Parainfluenza Virus 2 Not Detected     Parainfluenza Virus 3 Not Detected     Parainfluenza Virus 4 Not Detected     Bordetella pertussis pcr Not Detected     Influenza A H1 2009 PCR Not Detected     Chlamydophila pneumoniae PCR Not Detected     Mycoplasma pneumo by PCR Not Detected     Influenza A PCR Not Detected     Influenza A H3 Not Detected     Influenza A H1 Not Detected     RSV, PCR Not Detected     Bordetella parapertussis PCR Not Detected    Lafe Draw [[id number]] Collected:  09/04/19 1039    Specimen:  Blood Updated:  09/04/19 1145    Narrative:       The following orders were created for panel order Lafe Draw.  Procedure                               Abnormality         Status                     ---------                               -----------         ------                     Light Blue Top[[id number]]                                   Final result               Green Top (Gel)[[id number]]                                  Final result               Lavender Top[[id number]]                                      Final result               Gold Top - SST[[id number]]                                   Final result                 Please view results for these tests on the individual orders.    Gold Top - SST [[id number]] Collected:  09/04/19 1039    Specimen:  Blood Updated:  09/04/19 1145     Extra Tube Hold for add-ons.     Comment: Auto resulted.       Light Blue Top [[id number]] Collected:  09/04/19 1039    Specimen:  Blood Updated:  09/04/19 1145     Extra Tube hold for add-on     Comment: Auto resulted       Green Top (Gel) [[id number]] Collected:  09/04/19 1039    Specimen:  Blood Updated:  09/04/19 1145     Extra Tube Hold for add-ons.     Comment: Auto resulted.       Lavender Top [[id number]] Collected:  09/04/19 1039    Specimen:  Blood Updated:  09/04/19 1145     Extra Tube hold for add-on     Comment: Auto resulted       Urinalysis With Microscopic If Indicated (No Culture) - Urine, Clean Catch [[id number]]  (Abnormal) Collected:  09/04/19 1116    Specimen:  Urine, Clean Catch Updated:  09/04/19 1135     Color, UA Yellow     Appearance, UA Clear     pH, UA 7.0     Specific Gravity, UA 1.027     Glucose, UA Negative     Ketones, UA Trace     Bilirubin, UA Negative     Blood, UA Negative     Protein, UA Trace     Leuk Esterase, UA Trace     Nitrite, UA Negative     Urobilinogen, UA 1.0 E.U./dL    Urinalysis, Microscopic Only - Urine, Clean Catch [[id number]] Collected:  09/04/19 1116    Specimen:  Urine, Clean Catch Updated:  09/04/19 1135     RBC, UA 0-2 /HPF      WBC, UA 0-2 /HPF      Bacteria, UA None Seen /HPF      Squamous Epithelial Cells, UA 0-2 /HPF      Hyaline Casts, UA 3-6 /LPF      Methodology Automated Microscopy    Procalcitonin [[id number]]  (Abnormal) Collected:  09/04/19 1039    Specimen:  Blood Updated:  09/04/19 1129     Procalcitonin 0.35 ng/mL     Narrative:       As a Marker for Sepsis (Non-Neonates):   1. <0.5 ng/mL represents a low risk of severe sepsis and/or septic  "shock.  1. >2 ng/mL represents a high risk of severe sepsis and/or septic shock.    As a Marker for Lower Respiratory Tract Infections that require antibiotic therapy:  PCT on Admission     Antibiotic Therapy             6-12 Hrs later  > 0.5                Strongly Recommended            >0.25 - <0.5         Recommended  0.1 - 0.25           Discouraged                   Remeasure/reassess PCT  <0.1                 Strongly Discouraged          Remeasure/reassess PCT      As 28 day mortality risk marker: \"Change in Procalcitonin Result\" (> 80 % or <=80 %) if Day 0 (or Day 1) and Day 4 values are available. Refer to http://www.[employer] Partnerspct-calculator.com/   Change in PCT <=80 %   A decrease of PCT levels below or equal to 80 % defines a positive change in PCT test result representing a higher risk for 28-day all-cause mortality of patients diagnosed with severe sepsis or septic shock.  Change in PCT > 80 %   A decrease of PCT levels of more than 80 % defines a negative change in PCT result representing a lower risk for 28-day all-cause mortality of patients diagnosed with severe sepsis or septic shock.                Comprehensive Metabolic Panel [[id number]]  (Abnormal) Collected:  09/04/19 1039    Specimen:  Blood Updated:  09/04/19 1123     Glucose 144 mg/dL      BUN 14 mg/dL      Creatinine 1.07 mg/dL      Sodium 137 mmol/L      Potassium 4.1 mmol/L      Chloride 101 mmol/L      CO2 23.9 mmol/L      Calcium 9.0 mg/dL      Total Protein 6.7 g/dL      Albumin 3.60 g/dL      ALT (SGPT) 9 U/L      AST (SGOT) 14 U/L      Alkaline Phosphatase 120 U/L      Total Bilirubin 0.5 mg/dL      eGFR Non African Amer 67 mL/min/1.73      Globulin 3.1 gm/dL      A/G Ratio 1.2 g/dL      BUN/Creatinine Ratio 13.1     Anion Gap 12.1 mmol/L     Narrative:       GFR Normal >60  Chronic Kidney Disease <60  Kidney Failure <15    Lactic Acid, Plasma [[id number]]  (Normal) Collected:  09/04/19 1039    Specimen:  Blood Updated:  09/04/19 1120 " "    Lactate 1.8 mmol/L     CBC & Differential [[id number]] Collected:  09/04/19 1039    Specimen:  Blood Updated:  09/04/19 1058    Narrative:       The following orders were created for panel order CBC & Differential.  Procedure                               Abnormality         Status                     ---------                               -----------         ------                     CBC Auto Differential[[id number]]        Abnormal            Final result                 Please view results for these tests on the individual orders.    CBC Auto Differential [[id number]]  (Abnormal) Collected:  09/04/19 1039    Specimen:  Blood Updated:  09/04/19 1058     WBC 11.02 10*3/mm3      RBC 3.74 10*6/mm3      Hemoglobin 11.7 g/dL      Hematocrit 36.6 %      MCV 97.9 fL      MCH 31.3 pg      MCHC 32.0 g/dL      RDW 15.2 %      RDW-SD 54.6 fl      MPV 10.5 fL      Platelets 207 10*3/mm3      Neutrophil % 93.5 %      Lymphocyte % 1.5 %      Monocyte % 3.6 %      Eosinophil % 0.3 %      Basophil % 0.5 %      Immature Grans % 0.6 %      Neutrophils, Absolute 10.30 10*3/mm3      Lymphocytes, Absolute 0.17 10*3/mm3      Monocytes, Absolute 0.40 10*3/mm3      Eosinophils, Absolute 0.03 10*3/mm3      Basophils, Absolute 0.05 10*3/mm3      Immature Grans, Absolute 0.07 10*3/mm3      nRBC 0.0 /100 WBC         /76 (BP Location: Left arm, Patient Position: Lying)   Pulse 83   Temp 98.4 °F (36.9 °C) (Oral)   Resp 18   Ht 177.8 cm (70\")   Wt 93.2 kg (205 lb 6.4 oz)   SpO2 96%   BMI 29.47 kg/m²     Discharge Exam:  General Appearance:    Alert, cooperative, no distress                          Head:    Normocephalic, without obvious abnormality, atraumatic                          Eyes:                            Throat:   Lips, tongue, gums normal                          Neck:   Supple, symmetrical, trachea midline, no JVD                        Lungs:     Clear to auscultation bilaterally, respirations unlabored          "       Chest Wall:    No tenderness or deformity                        Heart:    Regular rate and rhythm, S1 and S2 normal, no murmur,no  Rub or gallop                  Abdomen:     Soft, non-tender, bowel sounds active, no masses, no organomegaly                  Extremities:   Extremities normal, atraumatic, no cyanosis or edema                             Skin:   Skin is warm and dry,  no rashes or palpable lesions                  Neurologic:   no focal deficits noted     Disposition:  Home    Patient Instructions:      Discharge Medications      New Medications      Instructions Start Date   cefdinir 300 MG capsule  Commonly known as:  OMNICEF   300 mg, Oral, 2 Times Daily         Continue These Medications      Instructions Start Date   acetaminophen 325 MG tablet  Commonly known as:  TYLENOL   650 mg, Oral, Every 6 Hours PRN      Arginine 1000 MG tablet   1,000 mg, Oral, Daily, HELD FOR OR      calcium carbonate 500 MG chewable tablet  Commonly known as:  TUMS   1 tablet, Oral, Every 4 Hours PRN      dutasteride 0.5 MG capsule  Commonly known as:  AVODART   0.5 mg, Oral, Daily      ferrous gluconate 324 MG tablet  Commonly known as:  FERGON   324 mg, Oral, Daily With Breakfast      fish oil 1000 MG capsule capsule   1,000 mg, Oral, Daily With Breakfast, HELD FOR OR       gabapentin 600 MG tablet  Commonly known as:  NEURONTIN   600 mg, Oral, 3 Times Daily      lisinopril 5 MG tablet  Commonly known as:  PRINIVIL,ZESTRIL   2.5 mg, Oral, Daily      multivitamin with minerals tablet   1 tablet, Oral, Daily      omeprazole 20 MG capsule  Commonly known as:  priLOSEC   20 mg, Oral, Daily      oxyCODONE-acetaminophen 5-325 MG per tablet  Commonly known as:  PERCOCET   As you order it 1 to 2 tablets p.o. every 4 to 6 hours as needed pain      polyethylene glycol pack packet  Commonly known as:  MIRALAX   17 g, Oral, 2 Times Daily      sennosides-docusate sodium 8.6-50 MG tablet  Commonly known as:  SENOKOT-S   2  tablets, Oral, 2 Times Daily      tamsulosin 0.4 MG capsule 24 hr capsule  Commonly known as:  FLOMAX   1 capsule, Oral, Nightly           Future Appointments   Date Time Provider Department Center   9/13/2019 10:30 AM Cristy Hilliard APRN [facility] None   10/11/2019 10:30 AM Alex Little MD [facility] None     Follow-up Information     Cheng Ngo MD Follow up in 1 week(s).    Specialty:  Internal Medicine  Why:  Get follow-up chest x-ray in 4 weeks.  Contact information:  [address] [address]  [phone]                 Discharge Order (From admission, onward)    Start     Ordered    09/05/19 1144  Discharge patient  Once     Expected Discharge Date:  09/05/19    Discharge Disposition:  Home or Self Care    Physician of Record for Attribution - Please select from Treatment Team:  MERCED SKAGGS [[id number]]    Review needed by CMO to determine Physician of Record:  No       Question Answer Comment   Physician of Record for Attribution - Please select from Treatment Team MERCED SKAGGS    Review needed by CMO to determine Physician of Record No        09/05/19 1146          Total time spent discharging patient including evaluation,post hospitalization follow up,  medication and post hospitalization instructions and education total time exceeds 30 minutes.    Signed:  Merced Skaggs MD  9/5/2019  11:47 AM

## 2019-09-06 NOTE — PROGRESS NOTES
Subjective   History of Present Illness: CHRIS Mccoy Jr. is a 78 y.o. male is here today for follow-up. Mr. Mccoy is over 4 weeks out from having a L2-S1 laminectomy by Dr. Luong and fusion by orthopedics on 08/12/19. Patient reports a burning sensation in his left lower leg after increased walking. Patient denies any incisional problems. Patient is taking Gabapentin 600 mg TID and Percocet 5-325 q4-6hrs PRN. On a scale of 0-10, the patient rates his pain a 3.5.    The left leg pain has improved greatly and he especially notices improvement with prolonged sitting. He now gets pain only when he is active. He was getting therapy, but had to quit for pneumonia, but is ready to go back.     History of Present Illness    The following portions of the patient's history were reviewed and updated as appropriate: allergies, current medications, past medical history, past surgical history and problem list.    Review of Systems   Respiratory: Negative for chest tightness and shortness of breath.    Cardiovascular: Negative for chest pain.   Musculoskeletal: Positive for back pain.   All other systems reviewed and are negative.      Objective     There were no vitals filed for this visit.  There is no height or weight on file to calculate BMI.      Physical Exam   Constitutional: He is oriented to person, place, and time. He appears well-developed and well-nourished. No distress.   Pulmonary/Chest: Effort normal.   Neurological: He is alert and oriented to person, place, and time.   Skin: Skin is warm and dry.   Psychiatric: He has a normal mood and affect.     Neurologic Exam     Mental Status   Oriented to person, place, and time.     Sensory Exam   Light touch normal.   Sensory deficit distribution on left: L4    Gait, Coordination, and Reflexes     Gait  Gait: (Walks with a walker)    Incision healing well    Assessment/Plan   Independent Review of Radiographic Studies:      I personally reviewed the images from the  following studies.    No imaging    Medical Decision Making:      He is doing well from the surgical perspective with minimal residual left lateral leg pain that only flares up with increased activity. We discussed this is fairly typical and should subside. It is a good sign that it is now only intermittent. For now, we will defer further care to Dr Little and of course we would be happy to re-evaluate at anytime. Recommend completing therapy and continuing Neurontin until the left leg pain has completely resolved. He says he is going to stop using the walker and use a cane instead, but he was advised to let therapy make that decision.     There are no diagnoses linked to this encounter.  No Follow-up on file.

## 2019-09-09 LAB
BACTERIA SPEC AEROBE CULT: NORMAL
BACTERIA SPEC AEROBE CULT: NORMAL

## 2019-09-13 ENCOUNTER — OFFICE VISIT (OUTPATIENT)
Dept: NEUROSURGERY | Facility: CLINIC | Age: 79
End: 2019-09-13

## 2019-09-13 DIAGNOSIS — M48.062 SPINAL STENOSIS OF LUMBAR REGION WITH NEUROGENIC CLAUDICATION: Primary | ICD-10-CM

## 2019-09-13 PROCEDURE — 99024 POSTOP FOLLOW-UP VISIT: CPT | Performed by: NURSE PRACTITIONER

## 2019-10-11 ENCOUNTER — OFFICE VISIT (OUTPATIENT)
Dept: ORTHOPEDIC SURGERY | Facility: CLINIC | Age: 79
End: 2019-10-11

## 2019-10-11 VITALS — WEIGHT: 205 LBS | BODY MASS INDEX: 29.35 KG/M2 | HEIGHT: 70 IN | TEMPERATURE: 98.7 F

## 2019-10-11 DIAGNOSIS — Z98.1 S/P LUMBAR FUSION: Primary | ICD-10-CM

## 2019-10-11 PROCEDURE — 72100 X-RAY EXAM L-S SPINE 2/3 VWS: CPT | Performed by: ORTHOPAEDIC SURGERY

## 2019-10-11 PROCEDURE — 99024 POSTOP FOLLOW-UP VISIT: CPT | Performed by: ORTHOPAEDIC SURGERY

## 2019-10-30 ENCOUNTER — TELEPHONE (OUTPATIENT)
Dept: ORTHOPEDIC SURGERY | Facility: CLINIC | Age: 79
End: 2019-10-30

## 2019-10-30 NOTE — TELEPHONE ENCOUNTER
Patient called back wanting to cancel this message. Says after some research he does not think he needs an MRI. Probably might need a cortisone injection.

## 2019-10-30 NOTE — TELEPHONE ENCOUNTER
Patient would like to know if a mri could be ordered of his left shoulder prior to his appointment with [facility] on 12/6.

## 2019-12-13 ENCOUNTER — OFFICE VISIT (OUTPATIENT)
Dept: ORTHOPEDIC SURGERY | Facility: CLINIC | Age: 79
End: 2019-12-13

## 2019-12-13 VITALS — BODY MASS INDEX: 28.7 KG/M2 | WEIGHT: 205 LBS | HEIGHT: 71 IN | TEMPERATURE: 97.5 F

## 2019-12-13 DIAGNOSIS — Z98.1 S/P LUMBAR FUSION: Primary | ICD-10-CM

## 2019-12-13 PROCEDURE — 72100 X-RAY EXAM L-S SPINE 2/3 VWS: CPT | Performed by: ORTHOPAEDIC SURGERY

## 2019-12-13 PROCEDURE — 99213 OFFICE O/P EST LOW 20 MIN: CPT | Performed by: ORTHOPAEDIC SURGERY

## 2019-12-13 RX ORDER — DOXYCYCLINE HYCLATE 100 MG/1
CAPSULE ORAL
COMMUNITY
Start: 2019-12-07 | End: 2020-08-22

## 2019-12-13 NOTE — PROGRESS NOTES
Back pain is improved.  No significant leg pain.  Two-view x-rays of the lumbar spine obtained to evaluate hardware and  fusion bone suggest further healing since prior x-ray.  We will need an AP and lateral lumbar x-ray on return visit.  Instructions were given.  I will see him in 3 months.  He will continue to wean the gabapentin meantime

## 2020-01-23 ENCOUNTER — TELEPHONE (OUTPATIENT)
Dept: ORTHOPEDIC SURGERY | Facility: CLINIC | Age: 80
End: 2020-01-23

## 2020-01-23 NOTE — TELEPHONE ENCOUNTER
Call returned to the patient.  I was unable to reach him but I did leave a message for him to contact me at the office

## 2020-01-27 NOTE — TELEPHONE ENCOUNTER
"Have spoken with the patient.  He has had a couple episodes when he gets up at night to go to the bathroom where he is felt a little \"foggy headed\" does have a history of vertigo but states that it was not like the symptoms that he has with vertigo.  He does notice that if he takes his time and sits on the side of the bed for few minutes before getting up he does not have those symptoms.  As of today those symptoms have resolved.  Have discussed with the patient that more than likely this is not related to his spine.  Would recommend if he continues to have symptoms should get in touch with his primary care physician.  Have also asked him to increase his p.o. fluid intake to see if that helps with his symptoms.  Have left it open for him to call if he has any other questions or concerns  "

## 2020-03-09 ENCOUNTER — OFFICE VISIT (OUTPATIENT)
Dept: ORTHOPEDIC SURGERY | Facility: CLINIC | Age: 80
End: 2020-03-09

## 2020-03-09 VITALS — HEIGHT: 70 IN | WEIGHT: 216 LBS | TEMPERATURE: 97.4 F | BODY MASS INDEX: 30.92 KG/M2

## 2020-03-09 DIAGNOSIS — M17.10 ARTHRITIS OF KNEE: Primary | ICD-10-CM

## 2020-03-09 PROCEDURE — 99212 OFFICE O/P EST SF 10 MIN: CPT | Performed by: ORTHOPAEDIC SURGERY

## 2020-03-09 PROCEDURE — 20610 DRAIN/INJ JOINT/BURSA W/O US: CPT | Performed by: ORTHOPAEDIC SURGERY

## 2020-03-09 RX ORDER — ERGOCALCIFEROL 1.25 MG/1
50000 CAPSULE ORAL
COMMUNITY
Start: 2020-01-09 | End: 2020-08-22

## 2020-03-09 RX ADMIN — METHYLPREDNISOLONE ACETATE 80 MG: 80 INJECTION, SUSPENSION INTRA-ARTICULAR; INTRALESIONAL; INTRAMUSCULAR; SOFT TISSUE at 17:14

## 2020-03-09 NOTE — PROGRESS NOTES
"Chief complaint: Bilateral knee pain, new complaint of \"Baker's cyst\" on the right    Mr. Mccoy follows up for both knees.  He has noticed some new swelling in the back of the right knee.  The swelling comes and goes.  Today is a good day as he states it.  The last injections did help both knees somewhat.  He continues to have soreness.  No mechanical symptoms.  He also reports burning pain in the front of both knees.  This is largely unchanged.  He uses a topical that helps tremendously..    Both knees are examined.  Skin is benign.  His right is a little more swollen than the left but there is no appreciable mass or cyst posteriorly.  No adenopathy.  Mild bilateral medial tenderness but nothing exquisite.  His motion is good.  Negative Kalpesh's test bilaterally.  No instability.  Gait is normal.    Assessment: 1.  Bilateral knee pain, probable neuropathic component 2.  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  I think a lot of his pain is neuropathic.  I suggested that he continue to use the topical anti-inflammatory as needed.  If the last injections helped then I think it is reasonable to repeat those today.  He did want to get those repeated.  The risk, benefits and alternatives were discussed.  He consented and the injections were performed as described below.  He will follow-up as needed.    Large Joint Arthrocentesis: R knee  Date/Time: 3/9/2020 5:14 PM  Consent given by: patient  Site marked: site marked  Timeout: Immediately prior to procedure a time out was called to verify the correct patient, procedure, equipment, support staff and site/side marked as required   Supporting Documentation  Indications: pain and joint swelling   Procedure Details  Location: knee - R knee  Preparation: Patient was prepped and draped in the usual sterile fashion  Needle gauge: 21 G.  Approach: anterolateral  Medications administered: 2 mL lidocaine " (cardiac); 80 mg methylPREDNISolone acetate 80 MG/ML  Patient tolerance: patient tolerated the procedure well with no immediate complications    Large Joint Arthrocentesis: L knee  Date/Time: 3/9/2020 5:14 PM  Consent given by: patient  Site marked: site marked  Timeout: Immediately prior to procedure a time out was called to verify the correct patient, procedure, equipment, support staff and site/side marked as required   Supporting Documentation  Indications: pain and joint swelling   Procedure Details  Location: knee - L knee  Preparation: Patient was prepped and draped in the usual sterile fashion  Needle gauge: 21 G\  Approach: anterolateral  Medications administered: 2 mL lidocaine (cardiac); 80 mg methylPREDNISolone acetate 80 MG/ML  Patient tolerance: patient tolerated the procedure well with no immediate complications

## 2020-03-13 RX ORDER — METHYLPREDNISOLONE ACETATE 80 MG/ML
80 INJECTION, SUSPENSION INTRA-ARTICULAR; INTRALESIONAL; INTRAMUSCULAR; SOFT TISSUE
Status: COMPLETED | OUTPATIENT
Start: 2020-03-09 | End: 2020-03-09

## 2020-03-18 ENCOUNTER — TELEPHONE (OUTPATIENT)
Dept: ORTHOPEDIC SURGERY | Facility: CLINIC | Age: 80
End: 2020-03-18

## 2020-03-18 NOTE — TELEPHONE ENCOUNTER
Spoke with patient and he was inform Dr Little needed to cancel his appointment for 3/20/2020.  He stated he understood and will call back at a later date to reschedule it.

## 2020-05-12 ENCOUNTER — OFFICE VISIT (OUTPATIENT)
Dept: ORTHOPEDIC SURGERY | Facility: CLINIC | Age: 80
End: 2020-05-12

## 2020-05-12 VITALS — BODY MASS INDEX: 29.88 KG/M2 | WEIGHT: 213.4 LBS | TEMPERATURE: 98.6 F | HEIGHT: 71 IN

## 2020-05-12 DIAGNOSIS — Z98.1 S/P LUMBAR FUSION: Primary | ICD-10-CM

## 2020-05-12 PROCEDURE — 99213 OFFICE O/P EST LOW 20 MIN: CPT | Performed by: ORTHOPAEDIC SURGERY

## 2020-05-12 PROCEDURE — 72100 X-RAY EXAM L-S SPINE 2/3 VWS: CPT | Performed by: ORTHOPAEDIC SURGERY

## 2020-05-12 NOTE — PROGRESS NOTES
He is looking pretty good.  Has some occasional back and rare radiating pain but overall very happy with his operative result.  He is just now picking up a golf club and getting ready to play.  Good strength on exam of the lower extremities.  2 view x-rays show solid fusion and no significant hardware loosening compared to prior films significant remodeling of the fusion mass is noted.  Doing very well I will see him as needed

## 2020-06-05 ENCOUNTER — TELEPHONE (OUTPATIENT)
Dept: ORTHOPEDIC SURGERY | Facility: CLINIC | Age: 80
End: 2020-06-05

## 2020-06-05 NOTE — TELEPHONE ENCOUNTER
Spoke with the patient.  Advised him that [facility] PAT does not require premedication after spinal fusion.

## 2020-06-05 NOTE — TELEPHONE ENCOUNTER
Patient had lumbar laminectomy/fusion by HERLINDA on 8/12/19. He has a dental cleaning appt on 6/8 and needs to know if he has to take antibiotics like he did last time? He has at home, Clindamycin 150 mg, #4 tabs. Please advise.

## 2020-06-16 ENCOUNTER — TELEPHONE (OUTPATIENT)
Dept: NEUROSURGERY | Facility: CLINIC | Age: 80
End: 2020-06-16

## 2020-06-16 NOTE — TELEPHONE ENCOUNTER
I called patient and told him that Cristy is out of the office this week. He can talk to her at the next visit about injection. He verbally understood.

## 2020-06-16 NOTE — TELEPHONE ENCOUNTER
PATIENT CALLED REGARDING THE FOLLOWING APPOINTMENT.  Type: FOLLOW UP EX  Date: 07/06/20  Provider: ALMA LOERA  Caller: PATIENT  Phone Number: [phone]  Reason: PATIENT CURRENTLY SCHEDULED DUE TO EXPERIENCING INCREASING SYMPTOMS SIMILAR TO HAVING BEFORE SURGERY IN AUG 2019. PATIENT ULTIMATELY INQUIRING INTO POSSIBLE OPTION OF INJECTIONS WITH PAIN MGMT BUT WANTS TO DISCUSS WITH PROVIDER BEFORE PURSUING.  Availability for callback: ANYTIME    PLEASE CONTACT PATIENT FOR FURTHER INSTRUCTION.

## 2020-07-02 NOTE — PROGRESS NOTES
"Subjective   History of Present Illness: CHRIS Mccoy Jr. is a 79 y.o. male is here today for follow-up. He was last in the office 9/13/19 for p/o visit following L2-S1 laminectomy by Dr. Luong and fusion by orthopedics on 08/12/19. Today Mr. Mccoy reports nerve pain and burning pain bilaterally in his legs. He has N/W in his feet. He denies back pain. He has some occasional issues with balance. He denies having any issues with bowel or bladder control.     History of Present Illness  He was last seen in this office 9/13/2019 and last saw Dr. Little 5/12/2020 but did not discussed the above complaints.  The following portions of the patient's history were reviewed and updated as appropriate: allergies, current medications, past family history, past medical history, past social history, past surgical history and problem list.    Review of Systems   Constitutional: Negative for chills and fever.   Cardiovascular: Negative for leg swelling.   Gastrointestinal: Negative for abdominal pain and constipation.   Genitourinary: Negative for difficulty urinating and frequency.   Musculoskeletal: Negative for back pain and gait problem.   Neurological: Positive for weakness (intermittent in BLE) and numbness.        Neuropathy in feet   All other systems reviewed and are negative.      Objective     ./85   Pulse 95   Temp 98 °F (36.7 °C)   Ht 179.1 cm (70.5\")   Wt 96.6 kg (213 lb)   BMI 30.13 kg/m²    Body mass index is 30.13 kg/m².      Physical Exam   Constitutional: He is oriented to person, place, and time. He appears well-developed and well-nourished.   HENT:   Head: Normocephalic.   Eyes: Pupils are equal, round, and reactive to light. EOM are normal.   Neck: Normal range of motion.   Cardiovascular: Normal rate.   Pulmonary/Chest: Effort normal.   Musculoskeletal: Normal range of motion.   Neurological: He is alert and oriented to person, place, and time. He has normal strength. Gait normal.   Skin: Skin is " warm and dry.   Psychiatric: He has a normal mood and affect.   Vitals reviewed.    Neurologic Exam     Mental Status   Oriented to person, place, and time.     Cranial Nerves     CN III, IV, VI   Pupils are equal, round, and reactive to light.  Extraocular motions are normal.     Motor Exam   Muscle bulk: normal  Overall muscle tone: normal    Strength   Strength 5/5 throughout.     Sensory Exam   Light touch normal.     Gait, Coordination, and Reflexes     Gait  Gait: normal    No bowel or bladder dysfunction  Trace edema in bilateral ankles  Dorsalis pedis pulses 1+  SLRT Left negative, Right negative       Assessment/Plan   Independent Review of Radiographic Studies:      I personally reviewed the images from the following studies.    No recent testing since he last saw Dr Little.    Medical Decision Making:      He has had a vascular workup prior to and since surgery. Although he has no hair on his lower legs, he says the vascular workup was normal. He recalls talking to Dr Luong prior to surgery and was told that the leg pain may never completley recover due to the length of time he was symptomatic prior to surgery. He would like to try the pain management options. He has a friend who sees Dr Trujillo and he would like to be referred there. If he does not get satisfactory relief from the injections, a next step may be an EMG/NCV as the residual symptoms may be related to peripheral neuropathy. That can be done through his PCP or our office. We will otherwise plan on seeing him back as needed.     CHRIS was seen today for follow-up.    Diagnoses and all orders for this visit:    Spinal stenosis of lumbar region with neurogenic claudication  -     Ambulatory Referral to Pain Management      Return if symptoms worsen or fail to improve.

## 2020-07-06 ENCOUNTER — OFFICE VISIT (OUTPATIENT)
Dept: NEUROSURGERY | Facility: CLINIC | Age: 80
End: 2020-07-06

## 2020-07-06 VITALS
HEIGHT: 71 IN | SYSTOLIC BLOOD PRESSURE: 131 MMHG | WEIGHT: 213 LBS | DIASTOLIC BLOOD PRESSURE: 85 MMHG | BODY MASS INDEX: 29.82 KG/M2 | TEMPERATURE: 98 F | HEART RATE: 95 BPM

## 2020-07-06 DIAGNOSIS — M48.062 SPINAL STENOSIS OF LUMBAR REGION WITH NEUROGENIC CLAUDICATION: Primary | ICD-10-CM

## 2020-07-06 PROCEDURE — 99213 OFFICE O/P EST LOW 20 MIN: CPT | Performed by: NURSE PRACTITIONER

## 2020-07-22 ENCOUNTER — APPOINTMENT (OUTPATIENT)
Dept: GENERAL RADIOLOGY | Facility: HOSPITAL | Age: 80
End: 2020-07-22

## 2020-07-22 ENCOUNTER — APPOINTMENT (OUTPATIENT)
Dept: CT IMAGING | Facility: HOSPITAL | Age: 80
End: 2020-07-22

## 2020-07-22 ENCOUNTER — HOSPITAL ENCOUNTER (INPATIENT)
Facility: HOSPITAL | Age: 80
LOS: 1 days | Discharge: HOME | End: 2020-07-24
Attending: EMERGENCY MEDICINE | Admitting: HOSPITALIST

## 2020-07-22 ENCOUNTER — APPOINTMENT (OUTPATIENT)
Dept: MRI IMAGING | Facility: HOSPITAL | Age: 80
End: 2020-07-22

## 2020-07-22 DIAGNOSIS — R47.81 SLURRED SPEECH: ICD-10-CM

## 2020-07-22 DIAGNOSIS — K11.8 MASS OF RIGHT PAROTID GLAND: ICD-10-CM

## 2020-07-22 DIAGNOSIS — R41.82 ALTERED MENTAL STATUS, UNSPECIFIED ALTERED MENTAL STATUS TYPE: ICD-10-CM

## 2020-07-22 DIAGNOSIS — G45.9 TIA (TRANSIENT ISCHEMIC ATTACK): Primary | ICD-10-CM

## 2020-07-22 PROBLEM — R93.89 ABNORMAL CHEST X-RAY: Status: ACTIVE | Noted: 2020-07-22

## 2020-07-22 PROBLEM — D69.6 THROMBOCYTOPENIA (HCC): Status: ACTIVE | Noted: 2020-07-22

## 2020-07-22 PROBLEM — R47.9 SPEECH DISTURBANCE: Status: ACTIVE | Noted: 2020-07-22

## 2020-07-22 LAB
ABO GROUP BLD: NORMAL
ALBUMIN SERPL-MCNC: 3.9 G/DL (ref 3.5–5.2)
ALBUMIN/GLOB SERPL: 1.6 G/DL
ALP SERPL-CCNC: 50 U/L (ref 39–117)
ALT SERPL W P-5'-P-CCNC: 15 U/L (ref 1–41)
AMPHET+METHAMPHET UR QL: NEGATIVE
ANION GAP SERPL CALCULATED.3IONS-SCNC: 9.1 MMOL/L (ref 5–15)
APAP SERPL-MCNC: <5 MCG/ML (ref 10–30)
APTT PPP: 29.4 SECONDS (ref 22.7–35.4)
AST SERPL-CCNC: 17 U/L (ref 1–40)
BARBITURATES UR QL SCN: NEGATIVE
BASOPHILS # BLD AUTO: 0.03 10*3/MM3 (ref 0–0.2)
BASOPHILS NFR BLD AUTO: 0.5 % (ref 0–1.5)
BENZODIAZ UR QL SCN: NEGATIVE
BILIRUB SERPL-MCNC: 0.3 MG/DL (ref 0–1.2)
BLD GP AB SCN SERPL QL: NEGATIVE
BUN SERPL-MCNC: 15 MG/DL (ref 8–23)
BUN/CREAT SERPL: 13.3 (ref 7–25)
CALCIUM SPEC-SCNC: 8.9 MG/DL (ref 8.6–10.5)
CANNABINOIDS SERPL QL: NEGATIVE
CHLORIDE SERPL-SCNC: 104 MMOL/L (ref 98–107)
CO2 SERPL-SCNC: 24.9 MMOL/L (ref 22–29)
COCAINE UR QL: NEGATIVE
CREAT SERPL-MCNC: 1.13 MG/DL (ref 0.76–1.27)
DEPRECATED RDW RBC AUTO: 51 FL (ref 37–54)
EOSINOPHIL # BLD AUTO: 0.1 10*3/MM3 (ref 0–0.4)
EOSINOPHIL NFR BLD AUTO: 1.8 % (ref 0.3–6.2)
ERYTHROCYTE [DISTWIDTH] IN BLOOD BY AUTOMATED COUNT: 14.4 % (ref 12.3–15.4)
ETHANOL BLD-MCNC: <10 MG/DL (ref 0–10)
ETHANOL UR QL: <0.01 %
GFR SERPL CREATININE-BSD FRML MDRD: 63 ML/MIN/1.73
GLOBULIN UR ELPH-MCNC: 2.5 GM/DL
GLUCOSE BLDC GLUCOMTR-MCNC: 78 MG/DL (ref 70–130)
GLUCOSE SERPL-MCNC: 94 MG/DL (ref 65–99)
HCT VFR BLD AUTO: 40 % (ref 37.5–51)
HGB BLD-MCNC: 13.7 G/DL (ref 13–17.7)
HOLD SPECIMEN: NORMAL
HOLD SPECIMEN: NORMAL
INR PPP: 0.95 (ref 0.9–1.1)
LYMPHOCYTES # BLD AUTO: 0.74 10*3/MM3 (ref 0.7–3.1)
LYMPHOCYTES NFR BLD AUTO: 13.5 % (ref 19.6–45.3)
MCH RBC QN AUTO: 32.9 PG (ref 26.6–33)
MCHC RBC AUTO-ENTMCNC: 34.3 G/DL (ref 31.5–35.7)
MCV RBC AUTO: 95.9 FL (ref 79–97)
METHADONE UR QL SCN: NEGATIVE
MONOCYTES # BLD AUTO: 0.47 10*3/MM3 (ref 0.1–0.9)
MONOCYTES NFR BLD AUTO: 8.6 % (ref 5–12)
NEUTROPHILS NFR BLD AUTO: 4.11 10*3/MM3 (ref 1.7–7)
NEUTROPHILS NFR BLD AUTO: 75.2 % (ref 42.7–76)
OPIATES UR QL: NEGATIVE
OXYCODONE UR QL SCN: NEGATIVE
PLATELET # BLD AUTO: 118 10*3/MM3 (ref 140–450)
PMV BLD AUTO: 11.9 FL (ref 6–12)
POTASSIUM SERPL-SCNC: 4.9 MMOL/L (ref 3.5–5.2)
PROT SERPL-MCNC: 6.4 G/DL (ref 6–8.5)
PROTHROMBIN TIME: 12.6 SECONDS (ref 11.7–14.2)
RBC # BLD AUTO: 4.17 10*6/MM3 (ref 4.14–5.8)
RH BLD: POSITIVE
SODIUM SERPL-SCNC: 138 MMOL/L (ref 136–145)
T&S EXPIRATION DATE: NORMAL
TROPONIN T SERPL-MCNC: <0.01 NG/ML (ref 0–0.03)
WBC # BLD AUTO: 5.47 10*3/MM3 (ref 3.4–10.8)
WHOLE BLOOD HOLD SPECIMEN: NORMAL
WHOLE BLOOD HOLD SPECIMEN: NORMAL

## 2020-07-22 PROCEDURE — 25010000002 GADOTERATE MEGLUMINE 10 MMOL/20ML SOLUTION: Performed by: HOSPITALIST

## 2020-07-22 PROCEDURE — 80307 DRUG TEST PRSMV CHEM ANLYZR: CPT | Performed by: HOSPITALIST

## 2020-07-22 PROCEDURE — 80053 COMPREHEN METABOLIC PANEL: CPT | Performed by: EMERGENCY MEDICINE

## 2020-07-22 PROCEDURE — 86850 RBC ANTIBODY SCREEN: CPT | Performed by: EMERGENCY MEDICINE

## 2020-07-22 PROCEDURE — 93005 ELECTROCARDIOGRAM TRACING: CPT | Performed by: EMERGENCY MEDICINE

## 2020-07-22 PROCEDURE — 71045 X-RAY EXAM CHEST 1 VIEW: CPT

## 2020-07-22 PROCEDURE — 86900 BLOOD TYPING SEROLOGIC ABO: CPT | Performed by: EMERGENCY MEDICINE

## 2020-07-22 PROCEDURE — G0378 HOSPITAL OBSERVATION PER HR: HCPCS

## 2020-07-22 PROCEDURE — 99222 1ST HOSP IP/OBS MODERATE 55: CPT | Performed by: PSYCHIATRY & NEUROLOGY

## 2020-07-22 PROCEDURE — 82962 GLUCOSE BLOOD TEST: CPT

## 2020-07-22 PROCEDURE — A9575 INJ GADOTERATE MEGLUMI 0.1ML: HCPCS | Performed by: HOSPITALIST

## 2020-07-22 PROCEDURE — 85610 PROTHROMBIN TIME: CPT | Performed by: EMERGENCY MEDICINE

## 2020-07-22 PROCEDURE — 85730 THROMBOPLASTIN TIME PARTIAL: CPT | Performed by: EMERGENCY MEDICINE

## 2020-07-22 PROCEDURE — 99285 EMERGENCY DEPT VISIT HI MDM: CPT

## 2020-07-22 PROCEDURE — 71250 CT THORAX DX C-: CPT

## 2020-07-22 PROCEDURE — 0042T HC CT CEREBRAL PERFUSION W/WO CONTRAST: CPT

## 2020-07-22 PROCEDURE — 70553 MRI BRAIN STEM W/O & W/DYE: CPT

## 2020-07-22 PROCEDURE — 0 IOPAMIDOL PER 1 ML: Performed by: EMERGENCY MEDICINE

## 2020-07-22 PROCEDURE — 82565 ASSAY OF CREATININE: CPT

## 2020-07-22 PROCEDURE — 70496 CT ANGIOGRAPHY HEAD: CPT

## 2020-07-22 PROCEDURE — 70498 CT ANGIOGRAPHY NECK: CPT

## 2020-07-22 PROCEDURE — 86901 BLOOD TYPING SEROLOGIC RH(D): CPT | Performed by: EMERGENCY MEDICINE

## 2020-07-22 PROCEDURE — 85025 COMPLETE CBC W/AUTO DIFF WBC: CPT | Performed by: EMERGENCY MEDICINE

## 2020-07-22 PROCEDURE — 93010 ELECTROCARDIOGRAM REPORT: CPT | Performed by: INTERNAL MEDICINE

## 2020-07-22 PROCEDURE — 84484 ASSAY OF TROPONIN QUANT: CPT | Performed by: EMERGENCY MEDICINE

## 2020-07-22 RX ORDER — GABAPENTIN 300 MG/1
300 CAPSULE ORAL EVERY 8 HOURS SCHEDULED
Status: DISCONTINUED | OUTPATIENT
Start: 2020-07-22 | End: 2020-07-24 | Stop reason: HOSPADM

## 2020-07-22 RX ORDER — SODIUM CHLORIDE 9 MG/ML
100 INJECTION, SOLUTION INTRAVENOUS CONTINUOUS
Status: DISCONTINUED | OUTPATIENT
Start: 2020-07-22 | End: 2020-07-24 | Stop reason: HOSPADM

## 2020-07-22 RX ORDER — GABAPENTIN 600 MG/1
600 TABLET ORAL 3 TIMES DAILY
COMMUNITY
End: 2020-08-14 | Stop reason: SDUPTHER

## 2020-07-22 RX ORDER — ERGOCALCIFEROL 1.25 MG/1
50000 CAPSULE ORAL WEEKLY
COMMUNITY

## 2020-07-22 RX ORDER — MULTIVIT-MIN/IRON FUM/FOLIC AC 7.5 MG-4
1 TABLET ORAL DAILY
COMMUNITY

## 2020-07-22 RX ORDER — TAMSULOSIN HYDROCHLORIDE 0.4 MG/1
0.4 CAPSULE ORAL DAILY
Status: DISCONTINUED | OUTPATIENT
Start: 2020-07-22 | End: 2020-07-24 | Stop reason: HOSPADM

## 2020-07-22 RX ORDER — CALCIUM CARBONATE 200(500)MG
1 TABLET,CHEWABLE ORAL EVERY 4 HOURS PRN
COMMUNITY

## 2020-07-22 RX ORDER — ACETAMINOPHEN 325 MG/1
650 TABLET ORAL EVERY 6 HOURS PRN
Status: DISCONTINUED | OUTPATIENT
Start: 2020-07-22 | End: 2020-07-24 | Stop reason: HOSPADM

## 2020-07-22 RX ORDER — LISINOPRIL 5 MG/1
5 TABLET ORAL DAILY
COMMUNITY

## 2020-07-22 RX ORDER — SODIUM CHLORIDE 0.9 % (FLUSH) 0.9 %
10 SYRINGE (ML) INJECTION AS NEEDED
Status: DISCONTINUED | OUTPATIENT
Start: 2020-07-22 | End: 2020-07-24 | Stop reason: HOSPADM

## 2020-07-22 RX ORDER — DUTASTERIDE 0.5 MG/1
0.5 CAPSULE, LIQUID FILLED ORAL DAILY
COMMUNITY

## 2020-07-22 RX ORDER — ATORVASTATIN CALCIUM 80 MG/1
80 TABLET, FILM COATED ORAL NIGHTLY
Status: DISCONTINUED | OUTPATIENT
Start: 2020-07-22 | End: 2020-07-24 | Stop reason: HOSPADM

## 2020-07-22 RX ORDER — ACETAMINOPHEN 325 MG/1
650 TABLET ORAL EVERY 6 HOURS PRN
COMMUNITY
End: 2022-05-24 | Stop reason: SDUPTHER

## 2020-07-22 RX ORDER — TAMSULOSIN HYDROCHLORIDE 0.4 MG/1
1 CAPSULE ORAL DAILY
COMMUNITY

## 2020-07-22 RX ORDER — CHLORAL HYDRATE 500 MG
1 CAPSULE ORAL DAILY
COMMUNITY
End: 2020-07-22

## 2020-07-22 RX ORDER — OMEPRAZOLE 20 MG/1
20 CAPSULE, DELAYED RELEASE ORAL DAILY
COMMUNITY
End: 2020-09-22

## 2020-07-22 RX ORDER — FINASTERIDE 5 MG/1
5 TABLET, FILM COATED ORAL DAILY
Status: DISCONTINUED | OUTPATIENT
Start: 2020-07-22 | End: 2020-07-24 | Stop reason: HOSPADM

## 2020-07-22 RX ORDER — ASPIRIN 325 MG
325 TABLET ORAL DAILY
Status: DISCONTINUED | OUTPATIENT
Start: 2020-07-22 | End: 2020-07-23

## 2020-07-22 RX ORDER — ASPIRIN 300 MG/1
300 SUPPOSITORY RECTAL DAILY
Status: DISCONTINUED | OUTPATIENT
Start: 2020-07-22 | End: 2020-07-23

## 2020-07-22 RX ORDER — PANTOPRAZOLE SODIUM 40 MG/1
40 TABLET, DELAYED RELEASE ORAL EVERY MORNING
Status: DISCONTINUED | OUTPATIENT
Start: 2020-07-23 | End: 2020-07-24 | Stop reason: HOSPADM

## 2020-07-22 RX ORDER — GADOTERATE MEGLUMINE 376.9 MG/ML
20 INJECTION INTRAVENOUS
Status: COMPLETED | OUTPATIENT
Start: 2020-07-22 | End: 2020-07-22

## 2020-07-22 RX ORDER — SODIUM CHLORIDE 0.9 % (FLUSH) 0.9 %
10 SYRINGE (ML) INJECTION EVERY 12 HOURS SCHEDULED
Status: DISCONTINUED | OUTPATIENT
Start: 2020-07-22 | End: 2020-07-24 | Stop reason: HOSPADM

## 2020-07-22 RX ADMIN — SODIUM CHLORIDE 100 ML/HR: 9 INJECTION, SOLUTION INTRAVENOUS at 16:19

## 2020-07-22 RX ADMIN — IOPAMIDOL 150 ML: 755 INJECTION, SOLUTION INTRAVENOUS at 13:45

## 2020-07-22 RX ADMIN — GADOTERATE MEGLUMINE 20 ML: 376.9 INJECTION, SOLUTION INTRAVENOUS at 22:15

## 2020-07-22 RX ADMIN — ASPIRIN 300 MG: 300 SUPPOSITORY RECTAL at 23:09

## 2020-07-23 ENCOUNTER — APPOINTMENT (OUTPATIENT)
Dept: CARDIOLOGY | Facility: HOSPITAL | Age: 80
End: 2020-07-23

## 2020-07-23 PROBLEM — I63.9 CVA (CEREBRAL VASCULAR ACCIDENT) (HCC): Status: ACTIVE | Noted: 2020-07-23

## 2020-07-23 LAB
AORTIC DIMENSIONLESS INDEX: 0.8 (DI)
B PARAPERT DNA SPEC QL NAA+PROBE: NOT DETECTED
B PERT DNA SPEC QL NAA+PROBE: NOT DETECTED
BH CV ECHO MEAS - ACS: 2.6 CM
BH CV ECHO MEAS - AO MAX PG (FULL): 0.71 MMHG
BH CV ECHO MEAS - AO MAX PG: 4.3 MMHG
BH CV ECHO MEAS - AO MEAN PG (FULL): 0 MMHG
BH CV ECHO MEAS - AO MEAN PG: 2 MMHG
BH CV ECHO MEAS - AO ROOT AREA (BSA CORRECTED): 1.8
BH CV ECHO MEAS - AO ROOT AREA: 11.9 CM^2
BH CV ECHO MEAS - AO ROOT DIAM: 3.9 CM
BH CV ECHO MEAS - AO V2 MAX: 103.4 CM/SEC
BH CV ECHO MEAS - AO V2 MEAN: 65.1 CM/SEC
BH CV ECHO MEAS - AO V2 VTI: 24.8 CM
BH CV ECHO MEAS - AVA(I,A): 3.8 CM^2
BH CV ECHO MEAS - AVA(I,D): 3.8 CM^2
BH CV ECHO MEAS - AVA(V,A): 4.1 CM^2
BH CV ECHO MEAS - AVA(V,D): 4.1 CM^2
BH CV ECHO MEAS - BSA(HAYCOCK): 2.2 M^2
BH CV ECHO MEAS - BSA: 2.1 M^2
BH CV ECHO MEAS - BZI_BMI: 28.3 KILOGRAMS/M^2
BH CV ECHO MEAS - BZI_METRIC_HEIGHT: 180.3 CM
BH CV ECHO MEAS - BZI_METRIC_WEIGHT: 92.1 KG
BH CV ECHO MEAS - EDV(CUBED): 140.6 ML
BH CV ECHO MEAS - EDV(MOD-SP2): 89 ML
BH CV ECHO MEAS - EDV(MOD-SP4): 74 ML
BH CV ECHO MEAS - EDV(TEICH): 129.5 ML
BH CV ECHO MEAS - EF(CUBED): 69.5 %
BH CV ECHO MEAS - EF(MOD-BP): 70 %
BH CV ECHO MEAS - EF(MOD-SP2): 62.9 %
BH CV ECHO MEAS - EF(MOD-SP4): 70.3 %
BH CV ECHO MEAS - EF(TEICH): 60.7 %
BH CV ECHO MEAS - ESV(CUBED): 42.9 ML
BH CV ECHO MEAS - ESV(MOD-SP2): 33 ML
BH CV ECHO MEAS - ESV(MOD-SP4): 22 ML
BH CV ECHO MEAS - ESV(TEICH): 50.9 ML
BH CV ECHO MEAS - FS: 32.7 %
BH CV ECHO MEAS - IVS/LVPW: 1.1
BH CV ECHO MEAS - IVSD: 0.9 CM
BH CV ECHO MEAS - LA DIMENSION: 3.7 CM
BH CV ECHO MEAS - LA/AO: 0.95
BH CV ECHO MEAS - LAT PEAK E' VEL: 7 CM/SEC
BH CV ECHO MEAS - LV DIASTOLIC VOL/BSA (35-75): 34.9 ML/M^2
BH CV ECHO MEAS - LV MASS(C)D: 156.9 GRAMS
BH CV ECHO MEAS - LV MASS(C)DI: 73.9 GRAMS/M^2
BH CV ECHO MEAS - LV MAX PG: 3.6 MMHG
BH CV ECHO MEAS - LV MEAN PG: 2 MMHG
BH CV ECHO MEAS - LV SYSTOLIC VOL/BSA (12-30): 10.4 ML/M^2
BH CV ECHO MEAS - LV V1 MAX: 94.4 CM/SEC
BH CV ECHO MEAS - LV V1 MEAN: 57.8 CM/SEC
BH CV ECHO MEAS - LV V1 VTI: 20.6 CM
BH CV ECHO MEAS - LVIDD: 5.2 CM
BH CV ECHO MEAS - LVIDS: 3.5 CM
BH CV ECHO MEAS - LVLD AP2: 8.5 CM
BH CV ECHO MEAS - LVLD AP4: 7.4 CM
BH CV ECHO MEAS - LVLS AP2: 6.8 CM
BH CV ECHO MEAS - LVLS AP4: 6.7 CM
BH CV ECHO MEAS - LVOT AREA (M): 4.5 CM^2
BH CV ECHO MEAS - LVOT AREA: 4.5 CM^2
BH CV ECHO MEAS - LVOT DIAM: 2.4 CM
BH CV ECHO MEAS - LVPWD: 0.8 CM
BH CV ECHO MEAS - MED PEAK E' VEL: 4 CM/SEC
BH CV ECHO MEAS - MR MAX PG: 96.4 MMHG
BH CV ECHO MEAS - MR MAX VEL: 491 CM/SEC
BH CV ECHO MEAS - MV A DUR: 0.17 SEC
BH CV ECHO MEAS - MV A MAX VEL: 186 CM/SEC
BH CV ECHO MEAS - MV DEC SLOPE: 428.2 CM/SEC^2
BH CV ECHO MEAS - MV DEC TIME: 340 SEC
BH CV ECHO MEAS - MV E MAX VEL: 153 CM/SEC
BH CV ECHO MEAS - MV E/A: 0.82
BH CV ECHO MEAS - MV MAX PG: 15.6 MMHG
BH CV ECHO MEAS - MV MEAN PG: 6 MMHG
BH CV ECHO MEAS - MV P1/2T MAX VEL: 166 CM/SEC
BH CV ECHO MEAS - MV P1/2T: 113.5 MSEC
BH CV ECHO MEAS - MV V2 MAX: 197.8 CM/SEC
BH CV ECHO MEAS - MV V2 MEAN: 119 CM/SEC
BH CV ECHO MEAS - MV V2 VTI: 53.8 CM
BH CV ECHO MEAS - MVA P1/2T LCG: 1.3 CM^2
BH CV ECHO MEAS - MVA(P1/2T): 1.9 CM^2
BH CV ECHO MEAS - MVA(VTI): 1.7 CM^2
BH CV ECHO MEAS - PULM A REVS DUR: 0.14 SEC
BH CV ECHO MEAS - PULM A REVS VEL: 26.8 CM/SEC
BH CV ECHO MEAS - PULM DIAS VEL: 42 CM/SEC
BH CV ECHO MEAS - PULM S/D: 1.5
BH CV ECHO MEAS - PULM SYS VEL: 64.1 CM/SEC
BH CV ECHO MEAS - RAP SYSTOLE: 8 MMHG
BH CV ECHO MEAS - RV MAX PG: 0.77 MMHG
BH CV ECHO MEAS - RV MEAN PG: 0 MMHG
BH CV ECHO MEAS - RV V1 MAX: 44 CM/SEC
BH CV ECHO MEAS - RV V1 MEAN: 28.7 CM/SEC
BH CV ECHO MEAS - RV V1 VTI: 9.4 CM
BH CV ECHO MEAS - RVSP: 33 MMHG
BH CV ECHO MEAS - SI(AO): 139.6 ML/M^2
BH CV ECHO MEAS - SI(CUBED): 46.1 ML/M^2
BH CV ECHO MEAS - SI(LVOT): 43.9 ML/M^2
BH CV ECHO MEAS - SI(MOD-SP2): 26.4 ML/M^2
BH CV ECHO MEAS - SI(MOD-SP4): 24.5 ML/M^2
BH CV ECHO MEAS - SI(TEICH): 37.1 ML/M^2
BH CV ECHO MEAS - SV(AO): 296.3 ML
BH CV ECHO MEAS - SV(CUBED): 97.7 ML
BH CV ECHO MEAS - SV(LVOT): 93.2 ML
BH CV ECHO MEAS - SV(MOD-SP2): 56 ML
BH CV ECHO MEAS - SV(MOD-SP4): 52 ML
BH CV ECHO MEAS - SV(TEICH): 78.6 ML
BH CV ECHO MEAS - TAPSE (>1.6): 3 CM2
BH CV ECHO MEAS - TR MAX VEL: 252 CM/SEC
BH CV ECHO MEASUREMENTS AVERAGE E/E' RATIO: 27.82
BH CV VAS BP RIGHT ARM: NORMAL MMHG
BH CV XLRA - RV BASE: 3.9 CM
BH CV XLRA - RV LENGTH: 9.1 CM
BH CV XLRA - RV MID: 2.9 CM
BH CV XLRA - TDI S': 17 CM/SEC
C PNEUM DNA NPH QL NAA+NON-PROBE: NOT DETECTED
CHOLEST SERPL-MCNC: 173 MG/DL (ref 0–200)
DEPRECATED RDW RBC AUTO: 49 FL (ref 37–54)
ERYTHROCYTE [DISTWIDTH] IN BLOOD BY AUTOMATED COUNT: 14.1 % (ref 12.3–15.4)
FLUAV H1 2009 PAND RNA NPH QL NAA+PROBE: NOT DETECTED
FLUAV H1 HA GENE NPH QL NAA+PROBE: NOT DETECTED
FLUAV H3 RNA NPH QL NAA+PROBE: NOT DETECTED
FLUAV SUBTYP SPEC NAA+PROBE: NOT DETECTED
FLUBV RNA ISLT QL NAA+PROBE: NOT DETECTED
GLUCOSE BLDC GLUCOMTR-MCNC: 109 MG/DL (ref 70–130)
GLUCOSE BLDC GLUCOMTR-MCNC: 114 MG/DL (ref 70–130)
GLUCOSE BLDC GLUCOMTR-MCNC: 92 MG/DL (ref 70–130)
GLUCOSE BLDC GLUCOMTR-MCNC: 93 MG/DL (ref 70–130)
GLUCOSE BLDC GLUCOMTR-MCNC: 96 MG/DL (ref 70–130)
HADV DNA SPEC NAA+PROBE: NOT DETECTED
HBA1C MFR BLD: 5.8 % (ref 4.8–5.6)
HCOV 229E RNA SPEC QL NAA+PROBE: NOT DETECTED
HCOV HKU1 RNA SPEC QL NAA+PROBE: NOT DETECTED
HCOV NL63 RNA SPEC QL NAA+PROBE: NOT DETECTED
HCOV OC43 RNA SPEC QL NAA+PROBE: NOT DETECTED
HCT VFR BLD AUTO: 40.8 % (ref 37.5–51)
HDLC SERPL-MCNC: 29 MG/DL (ref 40–60)
HGB BLD-MCNC: 14 G/DL (ref 13–17.7)
HMPV RNA NPH QL NAA+NON-PROBE: NOT DETECTED
HPIV1 RNA SPEC QL NAA+PROBE: NOT DETECTED
HPIV2 RNA SPEC QL NAA+PROBE: NOT DETECTED
HPIV3 RNA NPH QL NAA+PROBE: NOT DETECTED
HPIV4 P GENE NPH QL NAA+PROBE: NOT DETECTED
LDLC SERPL CALC-MCNC: 95 MG/DL (ref 0–100)
LDLC/HDLC SERPL: 3.28 {RATIO}
LEFT ATRIUM VOLUME INDEX: 33 ML/M2
LEFT ATRIUM VOLUME: 65 CM3
M PNEUMO IGG SER IA-ACNC: NOT DETECTED
MAXIMAL PREDICTED HEART RATE: 142 BPM
MCH RBC QN AUTO: 32.5 PG (ref 26.6–33)
MCHC RBC AUTO-ENTMCNC: 34.3 G/DL (ref 31.5–35.7)
MCV RBC AUTO: 94.7 FL (ref 79–97)
PLATELET # BLD AUTO: 129 10*3/MM3 (ref 140–450)
PMV BLD AUTO: 11.5 FL (ref 6–12)
RBC # BLD AUTO: 4.31 10*6/MM3 (ref 4.14–5.8)
RHINOVIRUS RNA SPEC NAA+PROBE: NOT DETECTED
RSV RNA NPH QL NAA+NON-PROBE: NOT DETECTED
SARS-COV-2 RNA NPH QL NAA+NON-PROBE: NOT DETECTED
STRESS TARGET HR: 121 BPM
TRIGL SERPL-MCNC: 245 MG/DL (ref 0–150)
VLDLC SERPL-MCNC: 49 MG/DL (ref 5–40)
WBC # BLD AUTO: 6.63 10*3/MM3 (ref 3.4–10.8)

## 2020-07-23 PROCEDURE — 92523 SPEECH SOUND LANG COMPREHEN: CPT

## 2020-07-23 PROCEDURE — 93306 TTE W/DOPPLER COMPLETE: CPT | Performed by: INTERNAL MEDICINE

## 2020-07-23 PROCEDURE — 93306 TTE W/DOPPLER COMPLETE: CPT

## 2020-07-23 PROCEDURE — 92522 EVALUATE SPEECH PRODUCTION: CPT

## 2020-07-23 PROCEDURE — 80061 LIPID PANEL: CPT | Performed by: HOSPITALIST

## 2020-07-23 PROCEDURE — 83036 HEMOGLOBIN GLYCOSYLATED A1C: CPT | Performed by: HOSPITALIST

## 2020-07-23 PROCEDURE — 0202U NFCT DS 22 TRGT SARS-COV-2: CPT | Performed by: HOSPITALIST

## 2020-07-23 PROCEDURE — 97116 GAIT TRAINING THERAPY: CPT

## 2020-07-23 PROCEDURE — 82962 GLUCOSE BLOOD TEST: CPT

## 2020-07-23 PROCEDURE — 99233 SBSQ HOSP IP/OBS HIGH 50: CPT | Performed by: NURSE PRACTITIONER

## 2020-07-23 PROCEDURE — 92610 EVALUATE SWALLOWING FUNCTION: CPT

## 2020-07-23 PROCEDURE — 85027 COMPLETE CBC AUTOMATED: CPT | Performed by: HOSPITALIST

## 2020-07-23 PROCEDURE — 97161 PT EVAL LOW COMPLEX 20 MIN: CPT

## 2020-07-23 RX ORDER — CLOPIDOGREL BISULFATE 75 MG/1
300 TABLET ORAL ONCE
Status: COMPLETED | OUTPATIENT
Start: 2020-07-23 | End: 2020-07-23

## 2020-07-23 RX ORDER — CLOPIDOGREL BISULFATE 75 MG/1
75 TABLET ORAL DAILY
Status: DISCONTINUED | OUTPATIENT
Start: 2020-07-24 | End: 2020-07-24 | Stop reason: HOSPADM

## 2020-07-23 RX ORDER — ASPIRIN 81 MG/1
81 TABLET, CHEWABLE ORAL DAILY
Status: DISCONTINUED | OUTPATIENT
Start: 2020-07-24 | End: 2020-07-24 | Stop reason: HOSPADM

## 2020-07-23 RX ADMIN — SODIUM CHLORIDE, PRESERVATIVE FREE 10 ML: 5 INJECTION INTRAVENOUS at 21:30

## 2020-07-23 RX ADMIN — PANTOPRAZOLE SODIUM 40 MG: 40 TABLET, DELAYED RELEASE ORAL at 10:10

## 2020-07-23 RX ADMIN — GABAPENTIN 300 MG: 300 CAPSULE ORAL at 14:22

## 2020-07-23 RX ADMIN — FINASTERIDE 5 MG: 5 TABLET, FILM COATED ORAL at 10:10

## 2020-07-23 RX ADMIN — ASPIRIN 325 MG: 325 TABLET ORAL at 10:10

## 2020-07-23 RX ADMIN — CLOPIDOGREL 300 MG: 75 TABLET, FILM COATED ORAL at 16:56

## 2020-07-23 RX ADMIN — ATORVASTATIN CALCIUM 80 MG: 80 TABLET, FILM COATED ORAL at 21:30

## 2020-07-23 RX ADMIN — SODIUM CHLORIDE 100 ML/HR: 9 INJECTION, SOLUTION INTRAVENOUS at 03:38

## 2020-07-23 RX ADMIN — SODIUM CHLORIDE, PRESERVATIVE FREE 10 ML: 5 INJECTION INTRAVENOUS at 10:11

## 2020-07-23 RX ADMIN — GABAPENTIN 300 MG: 300 CAPSULE ORAL at 21:30

## 2020-07-23 RX ADMIN — TAMSULOSIN HYDROCHLORIDE 0.4 MG: 0.4 CAPSULE ORAL at 10:09

## 2020-07-24 VITALS
OXYGEN SATURATION: 92 % | TEMPERATURE: 98.2 F | HEIGHT: 71 IN | BODY MASS INDEX: 28.42 KG/M2 | SYSTOLIC BLOOD PRESSURE: 128 MMHG | RESPIRATION RATE: 18 BRPM | WEIGHT: 203 LBS | DIASTOLIC BLOOD PRESSURE: 79 MMHG | HEART RATE: 65 BPM

## 2020-07-24 LAB — CREAT BLDA-MCNC: 1 MG/DL (ref 0.6–1.3)

## 2020-07-24 RX ORDER — ASPIRIN 81 MG/1
81 TABLET, CHEWABLE ORAL DAILY
Qty: 30 TABLET | Refills: 0 | Status: SHIPPED | OUTPATIENT
Start: 2020-07-25 | End: 2020-08-24

## 2020-07-24 RX ORDER — CLOPIDOGREL BISULFATE 75 MG/1
75 TABLET ORAL DAILY
Qty: 30 TABLET | Refills: 0 | Status: SHIPPED | OUTPATIENT
Start: 2020-07-25 | End: 2020-08-10 | Stop reason: SDUPTHER

## 2020-07-24 RX ORDER — ATORVASTATIN CALCIUM 80 MG/1
80 TABLET, FILM COATED ORAL NIGHTLY
Qty: 30 TABLET | Refills: 0 | Status: SHIPPED | OUTPATIENT
Start: 2020-07-24 | End: 2020-08-10 | Stop reason: SDUPTHER

## 2020-07-24 RX ADMIN — FINASTERIDE 5 MG: 5 TABLET, FILM COATED ORAL at 08:54

## 2020-07-24 RX ADMIN — CLOPIDOGREL 75 MG: 75 TABLET, FILM COATED ORAL at 08:54

## 2020-07-24 RX ADMIN — ASPIRIN 81 MG: 81 TABLET, CHEWABLE ORAL at 08:54

## 2020-07-24 RX ADMIN — GABAPENTIN 300 MG: 300 CAPSULE ORAL at 14:10

## 2020-07-24 RX ADMIN — PANTOPRAZOLE SODIUM 40 MG: 40 TABLET, DELAYED RELEASE ORAL at 06:47

## 2020-07-24 RX ADMIN — SODIUM CHLORIDE, PRESERVATIVE FREE 10 ML: 5 INJECTION INTRAVENOUS at 08:54

## 2020-07-24 RX ADMIN — TAMSULOSIN HYDROCHLORIDE 0.4 MG: 0.4 CAPSULE ORAL at 08:54

## 2020-07-24 RX ADMIN — GABAPENTIN 300 MG: 300 CAPSULE ORAL at 06:46

## 2020-07-25 ENCOUNTER — READMISSION MANAGEMENT (OUTPATIENT)
Dept: CALL CENTER | Facility: HOSPITAL | Age: 80
End: 2020-07-25

## 2020-07-25 NOTE — OUTREACH NOTE
Prep Survey      Responses   [facility] facility patient discharged from?  [place]   Is LACE score < 7 ?  Yes   Eligibility  Readm Mgmt   Discharge diagnosis  TIA/CVA   COVID-19 Test Status  Negative   Does the patient have one of the following disease processes/diagnoses(primary or secondary)?  Stroke (TIA)   Does the patient have Home health ordered?  No   Is there a DME ordered?  No   Comments regarding appointments  see AVS   Medication alerts for this patient  aspirin, lipitor, plavix   Prep survey completed?  Yes          Lina Paez RN

## 2020-07-27 ENCOUNTER — TELEPHONE (OUTPATIENT)
Dept: NEUROLOGY | Facility: CLINIC | Age: 80
End: 2020-07-27

## 2020-07-27 NOTE — TELEPHONE ENCOUNTER
Provider: CARMEN PANTOJA  Caller: Satnam Mccoy  Relationship to Patient: PT'S WIFE    Reason for Call: PT WAS ADMITTED TO [facility] ON 7-22-20 FOR TIA AND DISCHARGE AND PT WIFE STATES THAT REKHA SEEN PT EVERY DAY IN HOSPITAL ON 7-24-20 AND PER DISCHARGE PT IS TO Follow up with Rekha Munoz APRN (Nurse Practitioner) in 3 months (10/23/2020) PLEASE ADVISE... (PT HAS  2 CHARTS THIS ONE HAS WHERE HE WAS SEEN IN [facility] ON 7-22-20 AND OTHER THAT WAS ALREADY ESTABLISED IS MRN [id number])

## 2020-07-28 ENCOUNTER — TELEPHONE (OUTPATIENT)
Dept: NEUROLOGY | Facility: CLINIC | Age: 80
End: 2020-07-28

## 2020-07-28 ENCOUNTER — READMISSION MANAGEMENT (OUTPATIENT)
Dept: CALL CENTER | Facility: HOSPITAL | Age: 80
End: 2020-07-28

## 2020-07-28 DIAGNOSIS — I63.81 LEFT THALAMIC INFARCTION (HCC): Primary | ICD-10-CM

## 2020-07-28 NOTE — TELEPHONE ENCOUNTER
Per wife, pt had back surgery in august to help with his legs that have hurt for years - before his stroke. Wife sts pt just says the pain is different, but the wife doesn't know what that means. Per pt, left leg has numbness, more so than the right. Pt feels the numbness may be a little worse since stroke, but he has had this for a while. Pt not sure if this pain is worse than before the stroke, but possibly. Pt denies redness/swelling, reports pain as a dull burning pain, pain from the thigh down.     Wife sts pt went to Dr. Luong and [facility]. Pt went two days before his stroke. He was given an inj of caudal w-fluoro on 07/20/20. This was not reported at the time of the stroke.

## 2020-07-28 NOTE — TELEPHONE ENCOUNTER
PATIENT WIFE, LANDON CALLED    PATIENT WAS TO BE REFERRED TO PHYSICAL THERAPY AT DISCHARGE FROM HOSPITAL. HE HAS BEEN HAVING PAIN IN HIS LEGS AND IS REQUESTING THAT BENJAMIN LOVE PUT THE ORDER IN.    PLEASE CALL MICHELLE [phone]

## 2020-07-28 NOTE — TELEPHONE ENCOUNTER
I will place PT referral but can you get more details on his leg symptoms? His stroke shouldn't cause leg pain. When did it start?what does it feel like? Where? (calf) Any swelling/redness? (?DVT)

## 2020-07-28 NOTE — OUTREACH NOTE
Stroke Week 1 Survey      Responses   [facility] patient discharged from?  [place]   Does the patient have one of the following disease processes/diagnoses(primary or secondary)?  Stroke (TIA)   Is there a successful TCM telephone encounter documented?  No   Week 1 attempt successful?  No   Unsuccessful attempts  Attempt 1          Nikki Burnett RN

## 2020-07-29 DIAGNOSIS — I63.81 LEFT THALAMIC INFARCTION (HCC): Primary | ICD-10-CM

## 2020-08-04 ENCOUNTER — READMISSION MANAGEMENT (OUTPATIENT)
Dept: CALL CENTER | Facility: HOSPITAL | Age: 80
End: 2020-08-04

## 2020-08-04 NOTE — OUTREACH NOTE
Stroke Week 1 Survey      Responses   [facility] patient discharged from?  [place]   Does the patient have one of the following disease processes/diagnoses(primary or secondary)?  Stroke (TIA)   Is there a successful TCM telephone encounter documented?  No   Week 1 attempt successful?  Yes   Call start time  [id number]   Call end time  1012   Discharge diagnosis  TIA/CVA   Person spoke with today (if not patient) and relationship  patient and wife, Satnam Garcia reviewed with patient/caregiver?  Yes   Is the patient having any side effects they believe may be caused by any medication additions or changes?  No   Does the patient have all medications ordered at discharge?  Yes   Prescription comments  will be phasing out Gabapentin, due to containdications with new prescriptions   Is the patient taking all medications as directed (includes completed medication regime)?  Yes   Comments regarding appointments  is followed by pain management   Does the patient have a primary care provider?   Yes   Does the patient have an appointment with their PCP within 7 days of discharge?  Yes   Has the patient kept scheduled appointments due by today?  Yes   Has home health visited the patient within 72 hours of discharge?  N/A   Psychosocial issues?  No   Does the patient require any assistance with activities of daily living such as eating, bathing, dressing, walking, etc.?  No   Does the patient have any residual symptoms from stroke/TIA?  Yes   Residual symptoms comments  legs weak, aching, worse in left leg, most of pain was chronic, prior to stroke, still has some difficulty focusing on word choice when speaking   Does the patient understand the diet ordered at discharge?  Yes   Did the patient receive a copy of their discharge instructions?  Yes   Nursing interventions  Reviewed instructions with patient   What is the patient's perception of their health status since discharge?  Improving   Nursing interventions  Nurse  provided patient education   Is the patient able to teach back FAST for Stroke?  Yes   Is the patient/caregiver able to teach back the risk factors for a stroke?  Carotid or other artery disease, History of TIAs   Is the patient/caregiver able to teach back signs and symptoms related to disease process for when to call PCP?  Yes   Is the patient/caregiver able to teach back signs and symptoms related to disease process for when to call 911?  Yes   Is the patient/caregiver able to teach back the hierarchy of who to call/visit for symptoms/problems? PCP, Specialist, Home health nurse, Urgent Care, ED, 911  Yes   Additional teach back comments  was on Zoom with a friend, who saw pt having stroke symptoms and called 911   Week 1 call completed?  Yes          Autumn Wells RN

## 2020-08-10 ENCOUNTER — TELEPHONE (OUTPATIENT)
Dept: NEUROLOGY | Facility: CLINIC | Age: 80
End: 2020-08-10

## 2020-08-10 NOTE — TELEPHONE ENCOUNTER
PT'S WIFE LANDON CALLED ASKING TO SPEAK TO CARMEN PANTOJA REGARDING LIPITOR AND PLAVIX. SHE SAID PT IS ALSO ON GABAPENTIN AND LANDON REMEMBERS BENJAMIN STATING SHE DIDN'T LIKE THE PT BEING ON GABAPENTIN WHILE TAKING THE OTHER MEDICATIONS. SHE DIDN'T ELABORATE MUCH MORE BUT SAID SHE NEEDS TO SPEAK TO BENJAMIN ABOUT A COUPLE OF THINGS. PLEASE ADVISE      CALL BACK- [phone]

## 2020-08-10 NOTE — TELEPHONE ENCOUNTER
Wife wants to be sure it is safe for pt to continue taking his Gabapentin 300 mg tid. Wife thought someone told her in the hosp he shouldn't be taking it with Lipitor 80 mg and Plavix 75 mg. I told pt I would call her back after hearing from you.

## 2020-08-12 NOTE — TELEPHONE ENCOUNTER
Gila called to get an update on this. She state that her  is now becoming very forgetfull all of the sudden. She thinks he needs to be see sooner then later.

## 2020-08-13 ENCOUNTER — HOSPITAL ENCOUNTER (OUTPATIENT)
Dept: PHYSICAL THERAPY | Facility: HOSPITAL | Age: 80
Setting detail: THERAPIES SERIES
Discharge: HOME | End: 2020-08-13

## 2020-08-13 ENCOUNTER — HOSPITAL ENCOUNTER (OUTPATIENT)
Dept: SPEECH THERAPY | Facility: HOSPITAL | Age: 80
Setting detail: THERAPIES SERIES
Discharge: HOME | End: 2020-08-13

## 2020-08-13 DIAGNOSIS — R41.841 COGNITIVE COMMUNICATION DEFICIT: ICD-10-CM

## 2020-08-13 DIAGNOSIS — I63.9 CEREBROVASCULAR ACCIDENT (CVA), UNSPECIFIED MECHANISM (HCC): Primary | ICD-10-CM

## 2020-08-13 DIAGNOSIS — R26.89 FUNCTIONAL GAIT ABNORMALITY: ICD-10-CM

## 2020-08-13 DIAGNOSIS — I69.951 HEMIPLEGIA AFFECTING RIGHT SIDE IN RIGHT-DOMINANT PATIENT AS LATE EFFECT OF CEREBROVASCULAR DISEASE (HCC): Primary | ICD-10-CM

## 2020-08-13 PROCEDURE — 96125 COGNITIVE TEST BY HC PRO: CPT | Performed by: SPEECH-LANGUAGE PATHOLOGIST

## 2020-08-13 PROCEDURE — 97162 PT EVAL MOD COMPLEX 30 MIN: CPT

## 2020-08-13 NOTE — THERAPY EVALUATION
.Outpatient Physical Therapy Neuro Initial Evaluation  [facility]     Patient Name: Nolan Mccoy Jr.  : 1940  MRN: [id number]  Today's Date: 2020      Visit Date: 2020    Patient Active Problem List   Diagnosis   • BPH (benign prostatic hyperplasia)   • Hypertension   • Neuropathy   • Screen for colon cancer   • Right bundle branch block (RBBB)   • GERD (gastroesophageal reflux disease)   • Lumbar spinal stenosis   • Spinal stenosis of lumbar region with neurogenic claudication   • Postoperative hypotension   • Postoperative anemia due to acute blood loss   • Thrombocytopenia due to blood loss   • Hospital-acquired pneumonia   • Fever in adult   • TIA (transient ischemic attack)   • Speech disturbance   • Thrombocytopenia (CMS/HCC)   • Abnormal chest x-ray   • CVA (cerebral vascular accident) (CMS/HCC)        Past Medical History:   Diagnosis Date   • BPH (benign prostatic hyperplasia)    • GERD (gastroesophageal reflux disease)    • History of transfusion    • Hypertension    • Right bundle branch block    • Spinal stenosis of lumbar region     NUMBNESS/ TINGLING BILAT FEET, PAIN DOWN LEFT LEG         Past Surgical History:   Procedure Laterality Date   • BACK SURGERY     • COLONOSCOPY     • FRACTURE SURGERY      LEFT LEG COMPOUND FRACTURE AGE 10   • LUMBAR DISCECTOMY FUSION INSTRUMENTATION N/A 2019    Procedure: L2-S1 Laminectomy and Fusion with Instrumentation with Dr. Luong and Dr. Little;  Surgeon: Alex Little MD;  Location: [facility];  Service: Neurosurgery   • LUMBAR LAMINECTOMY DISCECTOMY DECOMPRESSION N/A 2019    Procedure: L2-S1 laminectomy with a fusion by orthopedics;  Surgeon: Adin Luong MD;  Location: [facility];  Service: Neurosurgery   • TONSILLECTOMY      age 7         Visit Dx:     ICD-10-CM ICD-9-CM   1. 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  "Functional gait abnormality R26.89 781.2       Patient History     Row Name 08/13/20 1016             History    Chief Complaint  Other 1 (comment) \"Getting up from commode. Steps w/o rail.\"   -LB      Date Current Problem(s) Began  07/22/20  -LB      Brief Description of Current Complaint  Pt developed slurred speech and was taken to ER on 7/22/20. Pt had a MRI and was diagnosed with an acute CVA with right side weakness. Pt was discharged home on 7/24/20. Pt had a lumbar discemtomy with spinal fusion (instrumentation) in August 2019 and [facility]. PMHx: neuropathy bilateral LE's (L > R) Per pt and wife neurologist reduced his Gabapentin and his legs have been bothering him more. Pt had a L LE compound fx at age 13 which has caused a leg length discrepancy with R LE shorter and pt has a heel lift in his R shoe.     -LB      Patient/Caregiver Goals  Relieve pain;Other (comment) Pt wants to releive leg pain.   -LB      Hand Dominance  right-handed  -LB      Patient seeing anyone else for problem(s)?  ST   -LB      Related/Recent Hospitalizations  Yes  -LB      Date of Hospitalization  07/22/20  -LB         Pain     Pain Location  Leg  -LB      Pain at Present  5 5 L LE,  3 R LE   -LB      Pain Frequency  Constant/continuous  -LB         Fall Risk Assessment    Any falls in the past year:  No  -LB         Services    Are you currently receiving Home Health services  No  -LB         Daily Activities    Primary Language  English  -LB      How does patient learn best?  Listening;Reading  -LB      Patient is concerned about/has problems with  Climbing Stairs;Other (comment) transfers from toilet   -LB      Does patient have problems with the following?  Other (comment) \"Little down.\" (noted on history form)  -LB      Barriers to learning  (S) Hearing pt has a hearing device   -LB      Pt Participated in POC and Goals  Yes  -LB         Safety    Are you being hurt, hit, or frightened by anyone " "at home or in your life?  No  -LB      Are you being neglected by a caregiver  No  -LB        User Key  (r) = Recorded By, (t) = Taken By, (c) = Cosigned By    Initials Name Provider Type    LB Soco Duran, PT Physical Therapist              PT Neuro     Row Name 08/13/20 1016             Subjective Comments    Subjective Comments  \"The pain in my legs is worse since they changeds my medicine.\"  Per wife Dr. Alford reduced his Gabapentin and wants him off it eventually. \"I have alittle trouble getting up from one toilet at home.\"   -LB         Precautions and Contraindications    Precautions  neuropathy, R LE shorter wears R heel lift   -LB         Subjective Pain    Able to rate subjective pain?  yes  -LB      Pre-Treatment Pain Level  5 in left leg   -LB      Post-Treatment Pain Level  5 in left leg  -LB      Subjective Pain Comment  \"My right leg is about a \"3\".\"  -LB         Home Living    Living Arrangements  house  -LB      Home Accessibility  stairs to enter home;stairs within home  -LB      Number of Stairs, Main Entrance  two  -LB      Stair Railings, Main Entrance  none  -LB      Number of Stairs, Second Entrance  other (see comments) flight to bedroom   -LB      Home Equipment  Other (Comment) none   -LB      Living Environment Comment  Pt reports he has a little trouble getting up the steps later in the day.  -LB         Cognition    Overall Cognitive Status  WFL  -LB      Orientation Level  Oriented to situation;Oriented to person;Oriented to time  -LB      Safety Judgment  Good awareness of safety precautions  -LB      Deficits  Fully aware of deficits  -LB         Proprioception    Proprioception  intact in B LE's   -LB         Posture/Observations    Alignment Options  Lumbar lordosis  -LB      Lumbar lordosis  Decreased  -LB      Posture/Observations Comments  Pt arrived to PT ambulating without a device with his wife.   -LB         General ROM    GENERAL ROM COMMENTS  AROM WNL B UE's and LE's   " -LB         MMT (Manual Muscle Testing)    Rt Upper Ext  Rt Shoulder WNL;Rt Elbow/Forearm WNL;Rt Wrist WNL  -LB      Lt Upper Ext  Lt Shoulder WNL;Lt Elbow/Forearm WNL;Lt Wrist WNL  -LB      Rt Lower Ext  Rt Hip Flexion;Rt Hip Extension;Rt Hip ABduction;Rt Knee Extension;Rt Knee Flexion;Rt Ankle Plantarflexion;Rt Ankle Dorsiflexion;Rt Ankle Subtalar Inversion;Rt Ankle Subtalar Eversion  -LB      Lt Lower Ext  Lt Hip WNL;Lt Knee WNL;Lt Ankle WNL  -LB         MMT Right Lower Ext    Rt Hip Flexion MMT, Gross Movement  (5/5) normal  -LB      Rt Hip Extension MMT, Gross Movement  other (see comments)  -LB      Rt Hip ABduction MMT, Gross Movement  -- maximal resistance in sidelying   -LB      Rt Knee Extension MMT, Gross Movement  (5/5) normal  -LB      Rt Knee Flexion MMT, Gross Movement  other (see comments) maximal resistance in sitting   -LB      Rt Ankle Plantarflexion MMT, Gross Movement  (4/5) good  -LB      Rt Ankle Dorsiflexion MMT, Gross Movement  (5/5) normal  -LB      Rt Ankle Subtalar Inversion MT, Gross Movement  (5/5) normal  -LB      Rt Ankle Subtalar Eversion MMT, Gross Movement  (5/5) normal  -LB         Tone    UE Tone  WNL for age  -LB      LE Tone  WNL for age  -LB         Bed Mobility Assessment/Treatment    Bed Mobility Assessment/Treatment  bed mobility (all) activities  -LB      [place] Level (Bed Mobility)  independent  -LB         Transfers    Sit-Stand [place] (Transfers)  independent from armless chair   -LB      Stand-Sit [place] (Transfers)  independent to armless chair   -LB         Gait/Stairs Assessment/Training    [place] Level (Gait)  supervision;conditional independence  -LB      Assistive Device (Gait)  -- none   -LB      Distance in Feet (Gait)  160' x 2  -LB      Pattern (Gait)  step-through  -LB      Right Sided Gait Deviations  -- R foot slap noted occasionally   -LB      [place] Level (Stairs)  contact guard  -LB      Assistive Device (Stairs)  -- none    -LB      Handrail Location (Stairs)  none  -LB      Number of Steps (Stairs)  4  -LB      Ascending Technique (Stairs)  step-over-step  -LB      Descending Technique (Stairs)  step-to-step slight loss of balance backwards when descending   -LB      Stairs, Impairments  impaired balance  -LB      Comment (Gait/Stairs)  When turning corner and looking back at this wife pt lost his balance minimally to the R and was able to correct balance. See Dynamic Gait Index 21/24   -LB         Balance Skills Training    Balance Comments  see REYNOLDS 47/56  -LB        User Key  (r) = Recorded By, (t) = Taken By, (c) = Cosigned By    Initials Name Provider Type    Soco Grigsby, PT Physical Therapist                       PT OP Goals     Row Name 08/13/20 1016          PT Short Term Goals    STG Date to Achieve  08/27/20  -LB     STG 1  Pt to be educated on techniques to improve transfers from a commode.   -LB        Long Term Goals    LTG Date to Achieve  09/10/20  -LB     LTG 1  Pt to ascend and descend 3 steps without a rail independently.   -LB     LTG 2  Pt to ambulate 200' independently with multiple change in directions without loss of balance.   -LB     LTG 3  Pt to be independent with HEP with emphasis on balance.   -LB     LTG 4  Pt and wife to be educated on proper technique for floor transfers .  -LB        Time Calculation    PT Goal Re-Cert Due Date  09/10/20  -LB       User Key  (r) = Recorded By, (t) = Taken By, (c) = Cosigned By    Initials Name Provider Type    Soco Grigsby, PT Physical Therapist          PT Assessment/Plan     Row Name 08/13/20 1751 08/13/20 1749       PT Assessment    Functional Limitations  --  Impaired gait  -LB    Impairments  --  Balance  -LB    Assessment Comments  Pt was diagnosed with a CVA with right hemiparesis and changes in speech, onset 7/22/20. Pt was discharged home on 7/22/20 and now returns to OP PT and ST. Pt has a history of neuropathy in bilateral LE's and a lumbar  "laminectomy with fusion in 2019. Pt and his wife were very concerned with pt's increased pain in his LE's since reducing his Gabapentin. Pt's R UE and R LE strength are overall 5/5.  Pt demonstrated minimal gait deviations and score of 21/24 on the Dynamic Gait Index which may be related to his leg length discrepancy and neuropathy. Pt demonstrated loss of balance when descending steps without a rail. Pt would benefit from PT with emphasis on transfers (specially commode), stairs and HEP for balance.      -LB  --    Please refer to paper survey for additional self-reported information  --  Yes  -LB    Rehab Potential  --  Good  -LB    Patient/caregiver participated in establishment of treatment plan and goals  --  Yes  -LB    Patient would benefit from skilled therapy intervention  --  Yes  -LB       PT Plan    PT Frequency  --  1x/week  -LB    Predicted Duration of Therapy Intervention (Therapy Eval)  --  4 weeks   -LB    Planned CPT's?  --  PT EVAL MOD COMPLELITY: 18701;PT NEUROMUSC RE-EDUCATION EA 15 MIN: 45195;PT THER ACT EA 15 MIN: 16287  -LB      User Key  (r) = Recorded By, (t) = Taken By, (c) = Cosigned By    Initials Name Provider Type    Soco Grigsby PT Physical Therapist             OP Exercises     Row Name 08/13/20 1016             Subjective Comments    Subjective Comments  \"The pain in my legs is worse since they changeds my medicine.\"  Per wife Dr. Alford reduced his Gabapentin and wants him off it eventually. \"I have alittle trouble getting up from one toilet at home.\"   -LB         Subjective Pain    Able to rate subjective pain?  yes  -LB      Pre-Treatment Pain Level  5 in left leg   -LB      Post-Treatment Pain Level  5 in left leg  -LB      Subjective Pain Comment  \"My right leg is about a \"3\".\"  -LB        User Key  (r) = Recorded By, (t) = Taken By, (c) = Cosigned By    Initials Name Provider Type    Soco Grigsby, PT Physical Therapist                      Outcome Measure Options: " Palm Balance, Dynamic Gait Index  Palm Balance Scale  Sitting to Standing: able to stand without using hands and stabilize independently  Standing Unsupported: able to stand safely for 2 minutes  Sitting with Back Unsupported but Feet Supported on Floor or on Stool: able to sit safely and securely for 2 minutes  Standing to Sitting: sits safely with minimal use of hands  Transfers: able to transfer safely with minor use of hands  Standing Unsupported with Eyes Closed: able to stand 10 seconds safely  Standing Unsupported with Feet Together: able to place feet together independently and stand 1 minute with supervision  Reaching Forward with Outstretched Arm While Standing: can reach forward confidently 25 cm (10 inches)   Object From the Floor From a Standing Position: able to  object safely and easily  Turning to Look Behind Over Left and Right Shoulders While Standing: looks behind from both sides and weight shifts well  Turn 360 Degrees: able to turn 360 degrees safely in 4 seconds or less  Place Alternate Foot on Step or Stool While Standing Unsupported: able to complete > 2 steps needs minimal assist  Standing Unsupported with One Foot in Front: able to take small step independently and hold 30 seconds  Standing on One Leg: tries to lift leg unable to hold 3 seconds but remains standing independently  Palm Total Score: 47  Dynamic Gait Index (DGI)  Gait Level Surface: Mild impairment: walks 20’, uses assistive devices, slower speed, mild gait deviations  Change in Gait Speed: Mild impairment: Is able to change speed but demonstrates mild gait deviations, or no gait deviations but unable to achieve a significant change in velocity, or uses as assistive device  Gait with Horizontal Head Turns: Normal: Performs head turns smoothly with no change in gait.  Gait with Vertical Head Turns: Normal: Performs head turns smoothly with no change in gait.  Gait and Pivot Turn: Normal: Pivot turns safely within  3 seconds and stops quickly with no loss of balance.  Step Over Obstacle: Normal: Is able to step over box without changing gait speed, no evidence for imbalance.  Step Around Obstacles: Normal: Is able to walk safely around cones safely without changing gait speed, no evidence of imbalance.  Steps: Mild impairment: Alternating feet, must use rail.  Dynamic Gait Index Score: 21  Dynamic Gait Index Comments: without a device     Time Calculation:           PT G-Codes  Outcome Measure Options: Palm Balance, Dynamic Gait Index  Palm Total Score: 47     Patient was wearing a face mask during this therapy encounter. Therapist used appropriate personal protective equipment including mask and gloves.  Mask used was standard procedure mask. Appropriate PPE was worn during the entire therapy session. Hand hygiene was completed before and after therapy session. Patient is not in enhanced droplet precautions. PT wore protective glasses.         Soco Duran, PT  8/13/2020

## 2020-08-13 NOTE — THERAPY EVALUATION
Outpatient Speech Language Pathology   Adult Speech Language Cognitive Initial Evaluation  [facility]     Patient Name: Nolan Mccoy Jr.  : 1940  MRN: [id number]  Today's Date: 2020        Visit Date: 2020   Patient Active Problem List   Diagnosis   • BPH (benign prostatic hyperplasia)   • Hypertension   • Neuropathy   • Screen for colon cancer   • Right bundle branch block (RBBB)   • GERD (gastroesophageal reflux disease)   • Lumbar spinal stenosis   • Spinal stenosis of lumbar region with neurogenic claudication   • Postoperative hypotension   • Postoperative anemia due to acute blood loss   • Thrombocytopenia due to blood loss   • Hospital-acquired pneumonia   • Fever in adult   • TIA (transient ischemic attack)   • Speech disturbance   • Thrombocytopenia (CMS/HCC)   • Abnormal chest x-ray   • CVA (cerebral vascular accident) (CMS/HCC)        Past Medical History:   Diagnosis Date   • BPH (benign prostatic hyperplasia)    • GERD (gastroesophageal reflux disease)    • History of transfusion    • Hypertension    • Right bundle branch block    • Spinal stenosis of lumbar region     NUMBNESS/ TINGLING BILAT FEET, PAIN DOWN LEFT LEG         Past Surgical History:   Procedure Laterality Date   • BACK SURGERY     • COLONOSCOPY     • FRACTURE SURGERY      LEFT LEG COMPOUND FRACTURE AGE 10   • LUMBAR DISCECTOMY FUSION INSTRUMENTATION N/A 2019    Procedure: L2-S1 Laminectomy and Fusion with Instrumentation with Dr. Luong and Dr. Little;  Surgeon: Alex Little MD;  Location: [facility];  Service: Neurosurgery   • LUMBAR LAMINECTOMY DISCECTOMY DECOMPRESSION N/A 2019    Procedure: L2-S1 laminectomy with a fusion by orthopedics;  Surgeon: Adin Luong MD;  Location: [facility];  Service: Neurosurgery   • TONSILLECTOMY      age 7         Visit Dx:    ICD-10-CM ICD-9-CM   1. Cerebrovascular accident (CVA), unspecified mechanism (CMS/HCC) I63.9 434.91   2. Cognitive communication  "deficit R41.841 799.52           SLP SLC Evaluation - 08/13/20 1200        Communication Assessment/Intervention    Document Type  evaluation   -KA    Subjective Information  no complaints   -KA    Patient Observations  alert;cooperative   -KA    Patient Effort  good   -KA    Symptoms Noted During/After Treatment  none   -KA       General Information    Patient Profile Reviewed  yes   -KA    Pertinent History Of Current Problem  Patient is referred to outpatient speech therapy for cognitive and communication impairment following recent acute CVA , Patient was admitted at [facility] 7/22 to 7/24 due to word finding trouble and diagnosed with left thalamic infarct. Patient reports difficulty with remembeing names and writing. Patient unable to explain detail of writing difficulty or provide examples and stated \"I don't think it will come out the way it should.\"   -KA    Precautions/Limitations, Vision  WFL   -KA    Precautions/Limitations, Hearing  hearing impairment, bilaterally   -KA    Prior Level of Function-Communication  WFL   -    Plans/Goals Discussed with  patient   -KA    Barriers to Rehab  none identified   -KA    Patient's Goals for Discharge  return to all previous roles/activities;functional communication;functional cognition   -    Standardized Assessment Used  CLQT   -       Standardized Tests    Cognitive/Memory Tests  CLQT: Cognitive Linguistic Quick Test   -       CLQT (The Cognitive Linguistic Quick Test)    Attention Domain Score  160   -KA    Attention Severity Rating  4: WNL   -KA    Memory Domain Score  133   -KA    Memory Severity Rating  3: Mild   -KA    Executive Function Domain Score  21   -KA    Executive Function Severity Rating  4: WNL   -KA    Language Domain Score  26   -KA    Language Severity Rating  3: Mild   -KA    Visuospatial Domain Score  74   -KA    Visuospatial Severity Rating  4: WNL   -KA    Clock Drawing Total Score  13   -KA    Clock Drawing Severity " Rating  WNL   -KA    Composite Severity Rating  3.6   -    Composite Severity Rating Range  4.0 - 3.5: WNL   -    CLQT Comments  Patient scored below the criterion cut off scores in personal facts and symbol trails indicating deficits in memory, following verbal and visual directions and divided attention. In immediate recall of paragraph patient recalled 8 facts out of 18. In generative naming mild impairment with thought organization naming 8 items in concrete category within 60 seconds. During picture description scene task pt displayed functional word finding, and also demonstrated functional word finding at the conversation level. Will continue diagnostic assessment of patient cognitive communication skills   -KA       SLP Clinical Impressions    SLP Diagnosis  Mild cognitive communication impairment    -KA    Rehab Potential/Prognosis  good   -KA    SLC Criteria for Skilled Therapy Interventions Met  yes   -KA    Functional Impact  difficulty completing vocational tasks;difficulty completing home management task   -KA       Recommendations    Therapy Frequency (SLP SLC)  1 day per week   -KA    Predicted Duration Therapy Intervention (Days)  until discharge   -KA    Anticipated Dischage Disposition (SLP)  home with assist   -KA      User Key  (r) = Recorded By, (t) = Taken By, (c) = Cosigned By    Initials Name Provider Type    Robbi Stern MA,CCC-SLP Speech and Language Pathologist                         OP SLP Education     Row Name 08/13/20 1246       Education    Barriers to Learning  No barriers identified  -    Education Provided  Described results of evaluation;Patient expressed understanding of evaluation  -KA    Assessed  Learning needs;Learning motivation  -KA    Learning Motivation  Strong  -    Learning Method  Explanation  -KA    Teaching Response  Verbalized understanding  -      User Key  (r) = Recorded By, (t) = Taken By, (c) = Cosigned By    Initials Name Effective Dates    KA  Robbi Harp MA,[facility]-SLP 06/08/18 -           SLP OP Goals     Row Name 08/13/20 1200          Goal Type Needed    Goal Type Needed  Memory;Verbal Expression;Auditory Comprehension;Probelm Solving;Written Language Expression  -KA        Subjective Pain    Able to rate subjective pain?  yes  -KA     Pre-Treatment Pain Level  0  -KA     Post-Treatment Pain Level  0  -KA        Written Language Expression Goals    Written Language Expression LTG's  Patient will be able to use written language skills to communicate effectively in all situations  -KA     Patient will be able to use written language skills to communicate effectively in all situations  90%:;without cues  -KA     Status: Patient will be able to use written language skills to communicate effectively in all situations  New  -KA     Written Language Expression STG's  Patient will improve written language skills by writing connected sentences related to topic provided  -KA     Patient will improve written language skills by writing connected sentences related to topic provided  90%:;without cues  -KA     Status: Patient will improve written language skills by writing connected sentences related to topic provided  New  -KA        Verbal Expression Goals    Verbal Expression LTG's  Patient will be able to use verbal expressive language skills to communicate effectively in all situations with unfamiliar listener  -KA     Patient will be able to use verbal expressive language skills to communicate effectively in all situations with unfamiliar listener  90%:;without cues  -KA     Status: Patient will be able to use verbal expressive language skills to communicate effectively in all situations with unfamiliar listener  New  -KA     Verbal Expression STG's  Patient will improve verbal expressive language skills by completing divergent naming tasks  -KA     Patient will improve verbal expressive language skills by completing divergent naming tasks  90%:;without cues   -KA     Status: Patient will improve verbal expressive language skills by completing divergent naming tasks  New  -KA        Auditory Comprehension Goals    Auditory Comprehension LTG's  Patient will comprehend abstract and complex information presented in all social, avocational, or work settings  -KA     Patient will comprehend abstract and complex information presented in all social, avocational, or work settings  90%:;without cues  -KA     Status: Patient will comprehend abstract and complex information presented in all social, avocational, or work settings  New  -KA     Auditory Comprehension STG's  Patient will improve comprehension of spoken language by following Multi-step directions  -KA     Patient will improve comprehension of spoken language by following Multi-step directions  90%:;without cues  -KA     Status: Patient will improve comprehension of spoken language by following Multi-step directions  New  -KA        Memory Goals    Memory LTG's  Patient will be able to remember information needed to participate in avocational activities  -KA     Status: Patient will be able to remember information needed to participate in avocational activities  New  -KA     Memory STG's  Patient will demonstrate improved ability to recall information by listening to paragraph and answering yes/no questions;Patient will demonstrate improved ability to recall information by immediately recalling a series of words  -KA     Patient will demonstrate improved ability to recall information by immediately recalling a series of words  90%:;unrelated;without cues  -KA     Status: Patient will demonstrate improved ability to recall information by immediately recalling a series of words  New  -KA     Patient will demonstrate improved ability to recall information by listening to paragraph and answering yes/no questions  90%:;without cues  -KA     Status: Patient will demonstrate improved ability to recall information by listening to  paragraph and answering yes/no questions  New  -KA        Problem Solving Goals    Problem Solving LTG's  Patient will be able to participate in the community safely  -KA     Patient will be able to participate in the community safely  Independently  -KA     Problem Solving STG's  Patient will improve ability to analyze problems and determine solutions by completing a visual representation/filling in a chart by following  written directions  -KA     Patient will improve ability to analyze problems and determine solutions by completing a visual representation/filling in a chart by following  written directions  90%:;without cues  -KA     Status: Patient will improve ability to analyze problems and determine solutions by completing a visual representation/filling in a chart by following  written directions  New  -KA       User Key  (r) = Recorded By, (t) = Taken By, (c) = Cosigned By    Initials Name Provider Type    Robbi Stern MA,CCC-SLP Speech and Language Pathologist          OP SLP Assessment/Plan - 08/13/20 1245        SLP Assessment    Functional Problems  Speech Language- Adult/Cognition   -KA    Impact on Function: Adult Speech Language/Cognition  Difficulty communicating wants, needs, and ideas;Trouble learning or remembering new information;Difficulty following directions;Decreased recall of personal information and medical history   -KA    Clinical Impression: Speech Language-Adult/Congnition  Mild:;Cognitive Communication Impairment   -KA    Please refer to paper survey for additional self-reported information  Yes   -KA    Please refer to items scanned into chart for additional diagnostic informaiton and handouts as provided by clinician  Yes   -KA    Prognosis  Good (comment)   -KA    Patient/caregiver participated in establishment of treatment plan and goals  Yes   -KA    Patient would benefit from skilled therapy intervention  Yes   -KA       SLP Plan    Frequency  1x a week    -KA    Duration   6 to 8 weeks   -BRENDA    Planned CPT's?  SLP INDIVIDUAL SPEECH THERAPY: 87187   -BRENDA    Expected Duration Therapy Session - minutes  45-60 minutes   -BRENDA      User Key  (r) = Recorded By, (t) = Taken By, (c) = Cosigned By    Initials Name Provider Type    Robbi Stern MA,CCC-SLP Speech and Language Pathologist             SLP Outcome Measures (last 72 hours)      SLP Outcome Measures     Row Name 08/13/20 1200             SLP Outcome Measures    Outcome Measure Used?  Adult NOMS  -         Adult FCM Scores    FCM Chosen  Memory;Problem Solving  -      Memory FCM Score  4  -KA      Problem Solving Score FCM  5  -KA        User Key  (r) = Recorded By, (t) = Taken By, (c) = Cosigned By    Initials Name Effective Dates    Robbi Stern MA,CCC-SLP 06/08/18 -         Patient was wearing a face mask during this therapy encounter. Therapist used appropriate personal protective equipment including mask, eye protection and gloves.  Mask used was standard procedure mask. Appropriate PPE was worn during the entire therapy session. Hand hygiene was completed before and after therapy session. Patient is not in enhanced droplet precautions.      1145 TO 1300  Time Calculation:   SLP Start Time: 1100  SLP Stop Time: 1145  SLP Time Calculation (min): 45 min    Therapy Charges for Today     Code Description Service Date Service Provider Modifiers Qty    68108333052 HC ST STD COG PERF TEST PER HOUR 8/13/2020 Robbi Harp MA,CCC-SLP GN 2                   Robbi Harp MA,HARRY-SLP  8/13/2020

## 2020-08-13 NOTE — TELEPHONE ENCOUNTER
Please let her know that those medications are safe to be taken together. You can move his appointment to be sooner. Thanks.

## 2020-08-14 ENCOUNTER — READMISSION MANAGEMENT (OUTPATIENT)
Dept: CALL CENTER | Facility: HOSPITAL | Age: 80
End: 2020-08-14

## 2020-08-14 ENCOUNTER — TELEPHONE (OUTPATIENT)
Dept: NEUROLOGY | Facility: CLINIC | Age: 80
End: 2020-08-14

## 2020-08-14 DIAGNOSIS — M48.062 SPINAL STENOSIS OF LUMBAR REGION WITH NEUROGENIC CLAUDICATION: Primary | ICD-10-CM

## 2020-08-14 RX ORDER — GABAPENTIN 600 MG/1
600 TABLET ORAL 3 TIMES DAILY
Qty: 90 TABLET | Refills: 2 | Status: SHIPPED | OUTPATIENT
Start: 2020-08-14 | End: 2021-11-18

## 2020-08-14 RX ORDER — CLOPIDOGREL BISULFATE 75 MG/1
75 TABLET ORAL DAILY
Qty: 30 TABLET | Refills: 1 | Status: SHIPPED | OUTPATIENT
Start: 2020-08-14 | End: 2020-09-13

## 2020-08-14 RX ORDER — ATORVASTATIN CALCIUM 80 MG/1
80 TABLET, FILM COATED ORAL NIGHTLY
Qty: 30 TABLET | Refills: 1 | Status: SHIPPED | OUTPATIENT
Start: 2020-08-14 | End: 2020-08-22

## 2020-08-14 NOTE — OUTREACH NOTE
Stroke Week 2 Survey      Responses   [facility] patient discharged from?  [place]   Does the patient have one of the following disease processes/diagnoses(primary or secondary)?  Stroke (TIA)   Week 2 attempt successful?  Yes   Call start time  1259   Call end time  1309   Discharge diagnosis  TIA/CVA   Meds reviewed with patient/caregiver?  Yes   Is the patient having any side effects they believe may be caused by any medication additions or changes?  No   Does the patient have all medications ordered at discharge?  Yes   Prescription comments  Gabapentin resumed at normal dose 8/14/20   Is the patient taking all medications as directed (includes completed medication regime)?  Yes   Does the patient have a primary care provider?   Yes   Does the patient have an appointment with their PCP within 7 days of discharge?  Yes   Has the patient kept scheduled appointments due by today?  Yes   Has home health visited the patient within 72 hours of discharge?  N/A   Psychosocial issues?  Yes   Does the patient require any assistance with activities of daily living such as eating, bathing, dressing, walking, etc.?  No   Does the patient have any residual symptoms from stroke/TIA?  Yes   Residual symptoms comments  speech was affected, has been having speech therapy with good results   Does the patient understand the diet ordered at discharge?  Yes   Did the patient receive a copy of their discharge instructions?  Yes   Nursing interventions  Reviewed instructions with patient   What is the patient's perception of their health status since discharge?  Improving   Nursing interventions  Nurse provided patient education   Is the patient able to teach back FAST for Stroke?  Yes   Is the patient/caregiver able to teach back the risk factors for a stroke?  Carotid or other artery disease, History of TIAs   Is the patient/caregiver able to teach back signs and symptoms related to disease process for when to call PCP?  Yes    Is the patient/caregiver able to teach back signs and symptoms related to disease process for when to call 911?  Yes   Is the patient/caregiver able to teach back the hierarchy of who to call/visit for symptoms/problems? PCP, Specialist, Home health nurse, Urgent Care, ED, 911  Yes   Week 2 call completed?  Yes          Autumn Wells RN

## 2020-08-14 NOTE — TELEPHONE ENCOUNTER
Patients spouse called and she wanted to know if we can go back to the 600MG Gabepentin. It is now at 300MG and they feel like the 600MG worked a lot better.     She is wanting to know if we can call in a prescription for the Gabepentin 600MG to their pharmacy.     Can someone please contact patient and advise if this can happen and submit the prescription if it is ok?       Nette Mccoy  [phone]

## 2020-08-18 RX ORDER — ATORVASTATIN CALCIUM 80 MG/1
80 TABLET, FILM COATED ORAL NIGHTLY
Qty: 30 TABLET | Refills: 1 | OUTPATIENT
Start: 2020-08-18 | End: 2020-09-17

## 2020-08-18 RX ORDER — CLOPIDOGREL BISULFATE 75 MG/1
75 TABLET ORAL DAILY
Qty: 30 TABLET | Refills: 1 | OUTPATIENT
Start: 2020-08-18 | End: 2020-09-17

## 2020-08-18 NOTE — TELEPHONE ENCOUNTER
PATIENT HAS HOSPITAL FOLLOW UP WITH [facility] IN September AND HE HAS MEDICATIONS ENOUGH FOR 2 DAYS

## 2020-08-20 ENCOUNTER — HOSPITAL ENCOUNTER (OUTPATIENT)
Dept: SPEECH THERAPY | Facility: HOSPITAL | Age: 80
Setting detail: THERAPIES SERIES
Discharge: HOME | End: 2020-08-20

## 2020-08-20 ENCOUNTER — HOSPITAL ENCOUNTER (OUTPATIENT)
Dept: PHYSICAL THERAPY | Facility: HOSPITAL | Age: 80
Setting detail: THERAPIES SERIES
Discharge: HOME | End: 2020-08-20

## 2020-08-20 DIAGNOSIS — I63.9 CEREBROVASCULAR ACCIDENT (CVA), UNSPECIFIED MECHANISM (HCC): Primary | ICD-10-CM

## 2020-08-20 DIAGNOSIS — R41.841 COGNITIVE COMMUNICATION DEFICIT: ICD-10-CM

## 2020-08-20 DIAGNOSIS — R26.89 FUNCTIONAL GAIT ABNORMALITY: ICD-10-CM

## 2020-08-20 DIAGNOSIS — I69.951 HEMIPLEGIA AFFECTING RIGHT SIDE IN RIGHT-DOMINANT PATIENT AS LATE EFFECT OF CEREBROVASCULAR DISEASE (HCC): Primary | ICD-10-CM

## 2020-08-20 PROCEDURE — 97530 THERAPEUTIC ACTIVITIES: CPT

## 2020-08-20 PROCEDURE — 97112 NEUROMUSCULAR REEDUCATION: CPT

## 2020-08-20 PROCEDURE — 97129 THER IVNTJ 1ST 15 MIN: CPT | Performed by: SPEECH-LANGUAGE PATHOLOGIST

## 2020-08-20 PROCEDURE — 97130 THER IVNTJ EA ADDL 15 MIN: CPT | Performed by: SPEECH-LANGUAGE PATHOLOGIST

## 2020-08-20 NOTE — THERAPY TREATMENT NOTE
Outpatient Speech Language Pathology   Adult Speech Language Cognitive Treatment Note  [facility]     Patient Name: Nolan Mccoy Jr.  : 1940  MRN: [id number]  Today's Date: 2020         Visit Date: 2020   Patient Active Problem List   Diagnosis   • BPH (benign prostatic hyperplasia)   • Hypertension   • Neuropathy   • Screen for colon cancer   • Right bundle branch block (RBBB)   • GERD (gastroesophageal reflux disease)   • Lumbar spinal stenosis   • Spinal stenosis of lumbar region with neurogenic claudication   • Postoperative hypotension   • Postoperative anemia due to acute blood loss   • Thrombocytopenia due to blood loss   • Hospital-acquired pneumonia   • Fever in adult   • TIA (transient ischemic attack)   • Speech disturbance   • Thrombocytopenia (CMS/HCC)   • Abnormal chest x-ray   • CVA (cerebral vascular accident) (CMS/HCC)          Visit Dx:    ICD-10-CM ICD-9-CM   1. Cerebrovascular accident (CVA), unspecified mechanism (CMS/HCC) I63.9 434.91   2. Cognitive communication deficit R41.841 799.52                         SLP OP Goals     Row Name 20 1300          Subjective Comments    Subjective Comments  Patient is pleasant and cooperative. Patient is very [place] and thus is a barrier for auditory memory tasks. Patient c/o dificulty remembering friends names. SLP discussed importance of memory aids/strategies and writing things down, SLP also discussed with wife memory strategies.   -KA        Subjective Pain    Able to rate subjective pain?  yes  -KA     Pre-Treatment Pain Level  0  -KA     Post-Treatment Pain Level  0  -KA        Written Language Expression Goals    Patient will improve written language skills by writing connected sentences related to topic provided  90%:;without cues  -KA     Status: Patient will improve written language skills by writing connected sentences related to topic provided  New  -KA     Comments: Patient will improve written language skills by  writing connected sentences related to topic provided  SLP completed diagnostic assessment of patient writing skills by administering writing portion of WAB. Pt scored 50/50 and demonstrated functional writing skills at the multisentence level   -KA        Memory Goals    Memory STG's  Patient’s memory skills will be enhanced as reported by patient by using external memory aides  -KA     Patient will demonstrate improved ability to recall information by immediately recalling a series of words  90%:;unrelated;without cues  -KA     Status: Patient will demonstrate improved ability to recall information by immediately recalling a series of words  New  -KA     Comments: Patient will demonstrate improved ability to recall information by immediately recalling a series of words  recalling aamir immediately and after 10 minute delay 100% recall of 5 names with use of strategies (name association, visualization) 4/5 80% immediate recall and 2/5 40% without cues after 5 min delay and 60% with cues   -KA     Patient’s memory skills will be enhanced as reported by patient by using external memory aides  90%:;without cues  -KA     Status: Patient’s memory skills will be enhanced as reported by patient by using external memory aides  New  -KA     Comments: Patient’s memory skills will be enhanced as reported by patient by using external memory aides  Discussed the importance of writing information down in notebook (daily acitivies/summary, doctors appointments, friends names etc) and SLP had patient write down biographical information on about me page. (patient has difficulty recalling address)   -KA     Patient will demonstrate improved ability to recall information by listening to paragraph and answering yes/no questions  90%:;without cues  -KA     Status: Patient will demonstrate improved ability to recall information by listening to paragraph and answering yes/no questions  New  -KA     Comments: Patient will demonstrate  improved ability to recall information by listening to paragraph and answering yes/no questions  difficulty with task due to pt very [place] and requires multiple repetitions overall 80% with repetitions   -BRENDA       User Key  (r) = Recorded By, (t) = Taken By, (c) = Cosigned By    Initials Name Provider Type    Robbi Stern MA,CCC-SLP Speech and Language Pathologist            Patient was wearing a face mask during this therapy encounter. Therapist used appropriate personal protective equipment including mask, eye protection and gloves.  Mask used was standard procedure mask. Appropriate PPE was worn during the entire therapy session. Hand hygiene was completed before and after therapy session. Patient is not in enhanced droplet precautions.                Time Calculation:   SLP Start Time: 1100  SLP Stop Time: 1145  SLP Time Calculation (min): 45 min    Therapy Charges for Today     Code Description Service Date Service Provider Modifiers Qty    40627142221 HC ST DEV OF COGN SKILLS INITIAL 15 MIN 8/20/2020 Robbi Harp MA,CCC-SLP  1    [id number] HC ST DEV OF COGN SKILLS EACH ADDT'L 15 MIN 8/20/2020 Robbi Harp MA,CCC-SLP  2                   Robbi Harp MA,CCC-SLP  8/20/2020

## 2020-08-20 NOTE — THERAPY TREATMENT NOTE
"    Outpatient Physical Therapy Neuro Treatment Note  [facility]     Patient Name: Nolan Mccoy Jr.  : 1940  MRN: [id number]  Today's Date: 2020      Visit Date: 2020    Visit Dx:    ICD-10-CM ICD-9-CM   1. 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 Functional gait abnormality R26.89 781.2       Patient Active Problem List   Diagnosis   • BPH (benign prostatic hyperplasia)   • Hypertension   • Neuropathy   • Screen for colon cancer   • Right bundle branch block (RBBB)   • GERD (gastroesophageal reflux disease)   • Lumbar spinal stenosis   • Spinal stenosis of lumbar region with neurogenic claudication   • Postoperative hypotension   • Postoperative anemia due to acute blood loss   • Thrombocytopenia due to blood loss   • Hospital-acquired pneumonia   • Fever in adult   • TIA (transient ischemic attack)   • Speech disturbance   • Thrombocytopenia (CMS/HCC)   • Abnormal chest x-ray   • CVA (cerebral vascular accident) (CMS/HCC)           PT Neuro     Row Name 20 1016             Subjective Comments    Subjective Comments  Pt's wife reported they adjusted his medication. \"My pain is in my left leg. Just a little.\" \"I didn't have the cane the other day. My wife made me bring it.\"   -LB         Precautions and Contraindications    Precautions  neuropathy, R LE shorter wears R heel lift   -LB         Subjective Pain    Able to rate subjective pain?  yes  -LB      Pre-Treatment Pain Level  3  -LB      Post-Treatment Pain Level  3  -LB      Subjective Pain Comment  left leg   -LB         Cognition    Overall Cognitive Status  WFL  -LB      Orientation Level  Oriented to situation;Oriented to person;Oriented to time  -LB      Safety Judgment  Good awareness of safety precautions  -LB      Deficits  Fully aware of deficits  -LB         Posture/Observations    Alignment Options  Lumbar lordosis  -LB      Lumbar " "lordosis  Decreased  -LB      Posture/Observations Comments  Pt arrived to PT ambulating with a straight cane with a rounded base.   -LB         Transfers    Sit-Stand [place] (Transfers)  independent  -LB      Stand-Sit [place] (Transfers)  independent  -LB      Comment (Transfers)  To stand from a low stool ~ 14\" to simulate a low commode independently. FLOOR TRANSFERS- with CGA and verbal cues.   -LB         Gait/Stairs Assessment/Training    [place] Level (Gait)  conditional independence  -LB      Assistive Device (Gait)  other (see comments) no device and straight cane with rounded base  -LB      Distance in Feet (Gait)  160' x 3 (1 x with cane, 2 x without a device   -LB      Pattern (Gait)  step-through  -LB      Right Sided Gait Deviations  -- R foot slap minimally at times   -LB      [place] Level (Stairs)  contact guard  -LB      Assistive Device (Stairs)  other (see comments) straight cane with rounded base 1st then no device  -LB      Handrail Location (Stairs)  none  -LB      Number of Steps (Stairs)  3  -LB      Ascending Technique (Stairs)  step-over-step  -LB      Descending Technique (Stairs)  step-to-step  -LB      Stairs, Impairments  impaired balance  -LB         Balance Skills Training    Standing-Level of Assistance  Contact guard  -LB      Static Standing Balance Support  No upper extremity supported  -LB      Standing-Balance Activities  Semi-tandum;Compliant surfaces;Trampoline 4\" foam , UE support with trampoline   -LB      Gait Balance-Level of Assistance  Close supervision  -LB      Gait Balance Support  No upper extremity supported  -LB      Gait Balance Activities  scanning environment R/L;backwards  -LB        User Key  (r) = Recorded By, (t) = Taken By, (c) = Cosigned By    Initials Name Provider Type    LB Soco Duran, PT Physical Therapist                  PT Assessment/Plan     Row Name 08/20/20 2018          PT Assessment    Assessment Comments  Pt arrived " "to PT with a straight cane with a rounded base. Pt reports he doesn't need the cane. Pt demonstrated no loss of balance while ambulating without a cane. Pt demonstrated good stability with floor transfers.   -LB       User Key  (r) = Recorded By, (t) = Taken By, (c) = Cosigned By    Initials Name Provider Type    Soco Grigsby PT Physical Therapist             OP Exercises     Row Name 08/20/20 1016             Subjective Comments    Subjective Comments  Pt's wife reported they adjusted his medication. \"My pain is in my left leg. Just a little.\" \"I didn't have the cane the other day. My wife made me bring it.\"   -LB         Subjective Pain    Able to rate subjective pain?  yes  -LB      Pre-Treatment Pain Level  3  -LB      Post-Treatment Pain Level  3  -LB      Subjective Pain Comment  left leg   -LB         Exercise 1    Exercise Name 1  REBOUNDER tossing and catching light weight ball with PT   -LB      Cueing 1  Verbal  -LB      Reps 1  10  -LB        User Key  (r) = Recorded By, (t) = Taken By, (c) = Cosigned By    Initials Name Provider Type    Soco Grigsby PT Physical Therapist                      PT OP Goals     Row Name 08/20/20 1300          Long Term Goals    LTG 4  Pt and wife to be educated on proper technique for floor transfers .  -LB     LTG 4 Progress  Met  -LB       User Key  (r) = Recorded By, (t) = Taken By, (c) = Cosigned By    Initials Name Provider Type    Soco Grigsby PT Physical Therapist          Therapy Education  Education Details: Floor transfers with one UE support.   Given: Mobility training  How Provided: Verbal  Provided to: Patient  Level of Understanding: Teach back education performed, Verbalized, Demonstrated              Time Calculation:   Start Time: 1016  Stop Time: 1100  Time Calculation (min): 44 min  Total Timed Code Minutes- PT: 44 minute(s)   Therapy Charges for Today     Code Description Service Date Service Provider Modifiers Qty    82250780813  PT " NEUROMUSC RE EDUCATION EA 15 MIN 8/20/2020 Soco Duran, PT GP 2    [id number]  PT THERAPEUTIC ACT EA 15 MIN 8/20/2020 Soco Duran, PT GP 1        Patient was wearing a face mask during this therapy encounter. Therapist used appropriate personal protective equipment including mask and gloves.  Mask used was standard procedure mask. Appropriate PPE was worn during the entire therapy session. Hand hygiene was completed before and after therapy session. Patient is not in enhanced droplet precautions. Pt wore protective glasses.                 Soco Duran, PT  8/20/2020

## 2020-08-22 ENCOUNTER — DOCUMENTATION (OUTPATIENT)
Dept: NEUROLOGY | Facility: CLINIC | Age: 80
End: 2020-08-22

## 2020-08-22 RX ORDER — ROSUVASTATIN CALCIUM 10 MG/1
10 TABLET, COATED ORAL NIGHTLY
Qty: 30 TABLET | Refills: 11 | Status: SHIPPED | OUTPATIENT
Start: 2020-08-22 | End: 2020-09-22 | Stop reason: SINTOL

## 2020-08-22 NOTE — PROGRESS NOTES
Wife reporting memory loss in patient since starting atorvastatin 80 mg. Memory seemed fine initially on return home after his stroke. Will change to crestor 10 mg.

## 2020-08-26 ENCOUNTER — TELEPHONE (OUTPATIENT)
Dept: NEUROLOGY | Facility: CLINIC | Age: 80
End: 2020-08-26

## 2020-08-26 NOTE — TELEPHONE ENCOUNTER
[place]  [phone] TODAY  [phone] TOMORROW      PT IS HAVING CHANGING  MORE FORGETFUL  SHE WOULD LIKE TO TALK TO SOMEONE ABOUT CHANGES

## 2020-08-27 ENCOUNTER — HOSPITAL ENCOUNTER (OUTPATIENT)
Dept: PHYSICAL THERAPY | Facility: HOSPITAL | Age: 80
Setting detail: THERAPIES SERIES
Discharge: HOME | End: 2020-08-27

## 2020-08-27 ENCOUNTER — HOSPITAL ENCOUNTER (OUTPATIENT)
Dept: SPEECH THERAPY | Facility: HOSPITAL | Age: 80
Setting detail: THERAPIES SERIES
Discharge: HOME | End: 2020-08-27

## 2020-08-27 DIAGNOSIS — I63.9 CEREBROVASCULAR ACCIDENT (CVA), UNSPECIFIED MECHANISM (HCC): Primary | ICD-10-CM

## 2020-08-27 DIAGNOSIS — R26.89 FUNCTIONAL GAIT ABNORMALITY: ICD-10-CM

## 2020-08-27 DIAGNOSIS — I69.951 HEMIPLEGIA AFFECTING RIGHT SIDE IN RIGHT-DOMINANT PATIENT AS LATE EFFECT OF CEREBROVASCULAR DISEASE (HCC): Primary | ICD-10-CM

## 2020-08-27 DIAGNOSIS — R41.841 COGNITIVE COMMUNICATION DEFICIT: ICD-10-CM

## 2020-08-27 PROCEDURE — 97130 THER IVNTJ EA ADDL 15 MIN: CPT | Performed by: SPEECH-LANGUAGE PATHOLOGIST

## 2020-08-27 PROCEDURE — 97112 NEUROMUSCULAR REEDUCATION: CPT | Performed by: PHYSICAL THERAPIST

## 2020-08-27 PROCEDURE — 97129 THER IVNTJ 1ST 15 MIN: CPT | Performed by: SPEECH-LANGUAGE PATHOLOGIST

## 2020-08-27 NOTE — TELEPHONE ENCOUNTER
"Patient's wife called stating that the patient has become even more forgetful, he is also complaining of leg pain and numbness from the knees down.  She states that he was fine when he came home from the hospital but on 7/25/20 she believes he \"took too much\" CBD oil and he has been more confused and forgetful since.  Patient is no longer taking CBD oil.  However, she is also concerned that his Crestor is causing the forgetfulness and leg pain.    She states that he doesn't want to take his medications because of the side effects, however, at this time, she states he is compliant with Crestor 10mg and Plavix 75mg.  She also states he is currently taking Gabapentin 300mg TID though there is a recent message asking to change his dose back to 600mg TID.    Discussed with CARMEN Carlos who instructed that the patient discontinue Crestor 10mg and ensure that the patient is no longer taking CBD oil. Discussed discontinuing Crestor with the patient's wife.  Also let her know that the Gabapentin will treat the patient's leg pain but not the numbness. Patient is currently still going to PT.  She will call if new issues arise.  "

## 2020-08-27 NOTE — THERAPY TREATMENT NOTE
"    Outpatient Physical Therapy Neuro Treatment Note  [facility]     Patient Name: Nolan Mccoy Jr.  : 1940  MRN: [id number]  Today's Date: 2020      Visit Date: 2020    Visit Dx:    ICD-10-CM ICD-9-CM   1. 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 Functional gait abnormality R26.89 781.2       Patient Active Problem List   Diagnosis   • BPH (benign prostatic hyperplasia)   • Hypertension   • Neuropathy   • Screen for colon cancer   • Right bundle branch block (RBBB)   • GERD (gastroesophageal reflux disease)   • Lumbar spinal stenosis   • Spinal stenosis of lumbar region with neurogenic claudication   • Postoperative hypotension   • Postoperative anemia due to acute blood loss   • Thrombocytopenia due to blood loss   • Hospital-acquired pneumonia   • Fever in adult   • TIA (transient ischemic attack)   • Speech disturbance   • Thrombocytopenia (CMS/HCC)   • Abnormal chest x-ray   • CVA (cerebral vascular accident) (CMS/HCC)           PT Neuro     Row Name 20 1055             Subjective Comments    Subjective Comments  \"I didn't sleep well last night. My wife called the doctor to talk about my medications bc something is off. The pain is worse in my legs.\"  -JK         Precautions and Contraindications    Precautions  neuropathy, R LE shorter wears R heel lift   -JK         Subjective Pain    Able to rate subjective pain?  yes  -JK      Pre-Treatment Pain Level  8  -JK      Post-Treatment Pain Level  8  -JK      Subjective Pain Comment  B LEs  -JK         Cognition    Overall Cognitive Status  WFL  -JK      Orientation Level  Oriented to situation;Oriented to person;Oriented to time  -JK      Safety Judgment  Good awareness of safety precautions  -JK      Deficits  Fully aware of deficits  -JK         Posture/Observations    Posture/Observations Comments  Pt arrived to PT ambulating with a straight " "cane with a rounded base.   -JK         Transfers    Sit-Stand [place] (Transfers)  independent  -JK      Stand-Sit [place] (Transfers)  independent  -JK         Gait/Stairs Assessment/Training    [place] Level (Gait)  conditional independence  -JK      Assistive Device (Gait)  other (see comments) no device  -JK      Distance in Feet (Gait)  75' x 2 ( with round tip cane and without)  -JK      Pattern (Gait)  step-through  -JK         Balance Skills Training    Standing-Level of Assistance  Contact guard  -JK      Static Standing Balance Support  parallel bars;No upper extremity supported  -JK      Standing-Balance Activities  Compliant surfaces;Semi-tandum;Standing unsupported;Feet together;Standing on 1/2 roll;Standing on foam roll head turns R/L in static stance on foam surface  -JK      Gait Balance-Level of Assistance  Contact guard  -JK      Gait Balance Support  No upper extremity supported  -JK      Gait Balance Activities  scanning environment R/L  -JK        User Key  (r) = Recorded By, (t) = Taken By, (c) = Cosigned By    Initials Name Provider Type    Fallon Quesada, PT Physical Therapist                  PT Assessment/Plan     Row Name 08/27/20 1235          PT Assessment    Functional Limitations  Impaired gait  -JK     Impairments  Balance  -JK     Assessment Comments  Pt reports he just isn't himself today after not sleeping well last night, increased pain in B LEs and concern over what medications he should be taking. Pt had slight gait disturbances ( decreased balance, decreased step length, decreased gait speed) with head turns R/L without use of cane.  -JK       User Key  (r) = Recorded By, (t) = Taken By, (c) = Cosigned By    Initials Name Provider Type    Fallon Quesada, PT Physical Therapist             OP Exercises     Row Name 08/27/20 5177             Subjective Comments    Subjective Comments  \"I didn't sleep well last night. My wife called the doctor to talk about " "my medications bc something is off. The pain is worse in my legs.\"  -JK         Subjective Pain    Able to rate subjective pain?  yes  -JK      Pre-Treatment Pain Level  8  -JK      Post-Treatment Pain Level  8  -JK      Subjective Pain Comment  B LEs  -JBRIE         Exercise 2    Exercise Name 2  heel raises on incline of parallel bars  -CANDIDO      Cueing 2  Verbal  -MEGANK      Reps 2  10  -JK        User Key  (r) = Recorded By, (t) = Taken By, (c) = Cosigned By    Initials Name Provider Type    Fallon Quesada, PT Physical Therapist                          Therapy Education  Education Details: Encouraged pt to reach out to PCP and/or neurologist to discuss medications and concerns  Given: Mobility training  Program: Reinforced  How Provided: Verbal  Provided to: Patient  Level of Understanding: Teach back education performed, Verbalized, Demonstrated              Time Calculation:   Start Time: 1015  Stop Time: 1100  Time Calculation (min): 45 min   Therapy Charges for Today     Code Description Service Date Service Provider Modifiers Qty    40524487084 HC PT NEUROMUSC RE EDUCATION EA 15 MIN 8/27/2020 Fallon Bolden, PT GP 3                Patient was wearing a face mask during this therapy encounter. Therapist used appropriate personal protective equipment including mask and gloves and eye protection.  Mask used was standard procedure mask. Appropriate PPE was worn during the entire therapy session. Hand hygiene was completed before and after therapy session. Patient is not in enhanced droplet precautions.         Fallon Bolden PT  8/27/2020     "

## 2020-08-27 NOTE — THERAPY TREATMENT NOTE
"Outpatient Speech Language Pathology   Adult Speech Language Cognitive Treatment Note  [facility]     Patient Name: Nolan Mccoy Jr.  : 1940  MRN: [id number]  Today's Date: 2020         Visit Date: 2020   Patient Active Problem List   Diagnosis   • BPH (benign prostatic hyperplasia)   • Hypertension   • Neuropathy   • Screen for colon cancer   • Right bundle branch block (RBBB)   • GERD (gastroesophageal reflux disease)   • Lumbar spinal stenosis   • Spinal stenosis of lumbar region with neurogenic claudication   • Postoperative hypotension   • Postoperative anemia due to acute blood loss   • Thrombocytopenia due to blood loss   • Hospital-acquired pneumonia   • Fever in adult   • TIA (transient ischemic attack)   • Speech disturbance   • Thrombocytopenia (CMS/HCC)   • Abnormal chest x-ray   • CVA (cerebral vascular accident) (CMS/HCC)          Visit Dx:    ICD-10-CM ICD-9-CM   1. Cerebrovascular accident (CVA), unspecified mechanism (CMS/HCC) I63.9 434.91   2. Cognitive communication deficit R41.841 799.52                         SLP OP Goals     Row Name 20 1200          Subjective Comments    Subjective Comments  Patient pleasant and cooperative. Patient talkative today and perseverates on the fact he feels his medication changes are contributing to his memory and word finding difficulty. He reports sometimes he remembers things fine and other times he doesn't (names) and \"this shouldn't be happening.\" Patient feels strongly his medication changes are contributing to his cognitive changes and not the stroke. He reports he is on 300mg Gabapentin (used to be on 600mg) and he reports he is stopping one medication tongight, but unable to recall the name. Patient [place] is barrier during conversation and during memory tasks, (masks also barrier)  -KA        Subjective Pain    Able to rate subjective pain?  yes  -KA     Pre-Treatment Pain Level  2  -KA     Post-Treatment Pain Level  2  -KA  "    Subjective Pain Comment  chronic leg pain  -KA        Memory Goals    Patient will demonstrate improved ability to recall information by immediately recalling a series of words  90%:;unrelated;without cues  -KA     Status: Patient will demonstrate improved ability to recall information by immediately recalling a series of words  Progressing as expected  -KA     Comments: Patient will demonstrate improved ability to recall information by immediately recalling a series of words  Immediate recall of 5 names with use of strategies 3/5 60% without cues and 100% with min cues, delayed recall after 10 minutes 4/5 80% without cues and with cue  -KA     Patient’s memory skills will be enhanced as reported by patient by using external memory aides  90%:;without cues  -KA     Status: Patient’s memory skills will be enhanced as reported by patient by using external memory aides  Progressing as expected  -KA     Comments: Patient’s memory skills will be enhanced as reported by patient by using external memory aides  Ongoing long discussion on benefit for use of strategies today. Patient very talkative today (see detail above), and reports frustration with sometimes forgetting friends names and also discussed forgetting names of golfers in [employer] torn[employer]. SLP continued education on the importance of writing information down with use of notebook, pt agreed he finds this will be helpful and useful however he has not started implementing yet (educated pt and wife last week). Discussed again with wife today recommend pt write down important information   -KA     Patient will demonstrate improved ability to recall information by listening to paragraph and answering yes/no questions  90%:;without cues  -KA     Status: Patient will demonstrate improved ability to recall information by listening to paragraph and answering yes/no questions  New  -KA     Comments: Patient will demonstrate improved ability to recall information by  listening to paragraph and answering yes/no questions  reading comprehension of multiparagraph (targeted visual memory instead of auditory due to patient very hard of hearing)=80% accuracy, summarization of information 70% difficulty with word finding at times  -BRENDA       User Key  (r) = Recorded By, (t) = Taken By, (c) = Cosigned By    Initials Name Provider Type    Robbi Stern MA,CCC-SLP Speech and Language Pathologist               Patient was not wearing a face mask during this therapy encounter. Therapist used appropriate personal protective equipment including mask, eye protection and gloves.  Mask used was standard procedure mask. Appropriate PPE was worn during the entire therapy session. Hand hygiene was completed before and after therapy session. Patient is not in enhanced droplet precautions.              Time Calculation:   SLP Start Time: 1100  SLP Stop Time: 1145  SLP Time Calculation (min): 45 min    Therapy Charges for Today     Code Description Service Date Service Provider Modifiers Qty    54434497689 HC ST DEV OF COGN SKILLS INITIAL 15 MIN 8/27/2020 Robbi Harp MA,CCC-SLP  1    67164057926 HC ST DEV OF COGN SKILLS EACH ADDT'L 15 MIN 8/27/2020 Robbi Harp MA,CCC-SLP  2                   Robbi Harp MA,CCC-SLP  8/27/2020

## 2020-08-31 ENCOUNTER — TELEPHONE (OUTPATIENT)
Dept: NEUROLOGY | Facility: CLINIC | Age: 80
End: 2020-08-31

## 2020-08-31 NOTE — TELEPHONE ENCOUNTER
Instructed wife, per Dr. Pond, remain off of statins until f/u with Rekha Munoz. Wife verbalized understanding.

## 2020-08-31 NOTE — TELEPHONE ENCOUNTER
Pt's wife has called multiple times about meds. Last week, rekha instructed wife to d/c pt's crestor & cbd oil due to wife's concern of altered mental s/e. Wife has called this morning wanting pt to be started on simvastatin (this would be his third statin since d/c 07/24/20. Would you mind to review as Rekha is out and there are no NP's here today?    Thank you

## 2020-08-31 NOTE — TELEPHONE ENCOUNTER
PT'S WIFE SANDRA CALLED IN WONDERING IF B[facility]JAM[facility] LOVE COULD START THE PATIENT ON simvastatin (ZOCOR) 20 MG tablet SHE STATES SHE IS NERVOUS SINCE TAKING THE PATIENT OFF rosuvastatin (Crestor) 10 MG tablet THAT HE MAY HAVE ANOTHER STROKE WITHOUT BEING ON SOME KIND OF MEDICATION.    SHE ALSO REQUESTED A SOONER APPOINTMENT BUT UNFORTUNATELY  I COULD NOT FIND ONE, I HAVE UTILIZED THE WAIT LIST FOR THIS APPOINTMENT.    PLEASE ADVISE     Sandra Mccoy (EC) [phone] (M)

## 2020-09-03 ENCOUNTER — HOSPITAL ENCOUNTER (OUTPATIENT)
Dept: PHYSICAL THERAPY | Facility: HOSPITAL | Age: 80
Setting detail: THERAPIES SERIES
Discharge: HOME | End: 2020-09-03

## 2020-09-03 ENCOUNTER — HOSPITAL ENCOUNTER (OUTPATIENT)
Dept: SPEECH THERAPY | Facility: HOSPITAL | Age: 80
Setting detail: THERAPIES SERIES
Discharge: HOME | End: 2020-09-03

## 2020-09-03 DIAGNOSIS — I69.951 HEMIPLEGIA AFFECTING RIGHT SIDE IN RIGHT-DOMINANT PATIENT AS LATE EFFECT OF CEREBROVASCULAR DISEASE (HCC): Primary | ICD-10-CM

## 2020-09-03 DIAGNOSIS — R41.841 COGNITIVE COMMUNICATION DEFICIT: ICD-10-CM

## 2020-09-03 DIAGNOSIS — R26.89 FUNCTIONAL GAIT ABNORMALITY: ICD-10-CM

## 2020-09-03 DIAGNOSIS — I63.9 CEREBROVASCULAR ACCIDENT (CVA), UNSPECIFIED MECHANISM (HCC): Primary | ICD-10-CM

## 2020-09-03 PROCEDURE — 97129 THER IVNTJ 1ST 15 MIN: CPT | Performed by: SPEECH-LANGUAGE PATHOLOGIST

## 2020-09-03 PROCEDURE — 97112 NEUROMUSCULAR REEDUCATION: CPT

## 2020-09-03 PROCEDURE — 97130 THER IVNTJ EA ADDL 15 MIN: CPT | Performed by: SPEECH-LANGUAGE PATHOLOGIST

## 2020-09-03 PROCEDURE — 97530 THERAPEUTIC ACTIVITIES: CPT

## 2020-09-03 NOTE — THERAPY DISCHARGE NOTE
"      Outpatient Physical Therapy Neuro Progress Note/Discharge Summary  [facility]     Patient Name: Nolan Mccoy Jr.  : 1940  MRN: [id number]  Today's Date: 9/3/2020      Visit Date: 2020    Visit Dx:    ICD-10-CM ICD-9-CM   1. 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 Functional gait abnormality R26.89 781.2       Patient Active Problem List   Diagnosis   • BPH (benign prostatic hyperplasia)   • Hypertension   • Neuropathy   • Screen for colon cancer   • Right bundle branch block (RBBB)   • GERD (gastroesophageal reflux disease)   • Lumbar spinal stenosis   • Spinal stenosis of lumbar region with neurogenic claudication   • Postoperative hypotension   • Postoperative anemia due to acute blood loss   • Thrombocytopenia due to blood loss   • Hospital-acquired pneumonia   • Fever in adult   • TIA (transient ischemic attack)   • Speech disturbance   • Thrombocytopenia (CMS/HCC)   • Abnormal chest x-ray   • CVA (cerebral vascular accident) (CMS/HCC)            PT Neuro     Row Name 20 1016             Subjective Comments    Subjective Comments  \"I am doing good. I got some exercises in the mail for my sciatica. It felt good to do them but I may have overdid it. I am a little sore. I got on the floor and did not have any trouble.\"   -LB         Precautions and Contraindications    Precautions  neuropathy, R LE shorter wears R heel lift   -LB         Subjective Pain    Able to rate subjective pain?  yes  -LB      Pre-Treatment Pain Level  4  -LB      Post-Treatment Pain Level  4  -LB      Subjective Pain Comment  B LE's   -LB         Cognition    Overall Cognitive Status  WFL  -LB      Orientation Level  Oriented to situation;Oriented to person;Oriented to time  -LB      Safety Judgment  Good awareness of safety precautions  -LB      Deficits  Fully aware of deficits  -LB         Posture/Observations    " Alignment Options  Lumbar lordosis  -LB      Lumbar lordosis  Decreased  -LB      Posture/Observations Comments  Pt arrived to PT ambulating with a straight cane with a rounded base.   -LB         Transfers    Sit-Stand Shoshone (Transfers)  independent  -LB      Stand-Sit [place]oshone (Transfers)  independent  -LB      Comment (Transfers)  Floor transfers- conditional independent (pt's wife observed )  -LB         Gait/Stairs Assessment/Training    Shoshone Level (Gait)  conditional independence;independent  -LB      Assistive Device (Gait)  other (see comments) no device or straighrt cane with rounded base   -LB      Distance in Feet (Gait)  200' without a device with head turns, 200' with cane with head turns   -LB      Pattern (Gait)  step-through  -LB      Right Sided Gait Deviations  -- R foot slap at times   -LB      Shoshone Level (Stairs)  independent  -LB      Assistive Device (Stairs)  other (see comments) no device   -LB      Handrail Location (Stairs)  none  -LB      Number of Steps (Stairs)  3  -LB      Ascending Technique (Stairs)  step-over-step  -LB      Descending Technique (Stairs)  step-to-step  -LB      Stairs, Impairments  impaired balance  -LB         Balance Skills Training    Standing-Level of Assistance  Independent  -LB      Static Standing Balance Support  No upper extremity supported  -LB      Standing-Balance Activities  Feet together;Semi-tandum standing in a corner   -LB        User Key  (r) = Recorded By, (t) = Taken By, (c) = Cosigned By    Initials Name Provider Type    Soco Grigsby PT Physical Therapist                  PT Assessment/Plan     Row Name 09/03/20 5085          PT Assessment    Assessment Comments  Pt reporting he was doing fine and had started some exercises for his sciatica that felt good. Pt was independent with [facility] for balance (see education), Pt demonstrated no loss of balance with ambulation with and without a cane with changing directions or  "head turns. Pt performed floor transfers conditional independent.   -LB       User Key  (r) = Recorded By, (t) = Taken By, (c) = Cosigned By    Initials Name Provider Type    Soco Grigsby, PT Physical Therapist               OP Exercises     Row Name 09/03/20 1016             Subjective Comments    Subjective Comments  \"I am doing good. I got some exercises in the mail for my sciatica. It felt good to do them but I may have overdid it. I am a little sore. I got on the floor and did not have any trouble.\"   -LB         Subjective Pain    Able to rate subjective pain?  yes  -LB      Pre-Treatment Pain Level  4  -LB      Post-Treatment Pain Level  4  -LB      Subjective Pain Comment  B LE's   -LB         Exercise 2    Exercise Name 2  heel raises on incline of parallel bars  -LB      Cueing 2  Verbal  -LB      Reps 2  15  -LB         Exercise 3    Exercise Name 3  hip abuction/ER in hooklying  -LB      Cueing 3  Verbal;Tactile  -LB      Reps 3  10  -LB      Additional Comments  pt demonstrated good alignment of his pelvis   -LB        User Key  (r) = Recorded By, (t) = Taken By, (c) = Cosigned By    Initials Name Provider Type    Soco Grigsby, PT Physical Therapist                        PT OP Goals     Row Name 09/03/20 1017          PT Short Term Goals    STG 1  Pt to be educated on techniques to improve transfers from a commode.   -LB     STG 1 Progress  Met  -LB        Long Term Goals    LTG 1  Pt to ascend and descend 3 steps without a rail independently.   -LB     LTG 1 Progress  Met  -LB     LTG 2  Pt to ambulate 200' independently with multiple change in directions without loss of balance.   -LB     LTG 2 Progress  Met  -LB     LTG 3  Pt to be independent with HEP with emphasis on balance.   -LB     LTG 3 Progress  Met  -LB     LTG 4  Pt and wife to be educated on proper technique for floor transfers .  -LB     LTG 4 Progress  Met  -LB     LTG 4 Progress Comments  Pt performed floor transfers " conditional independent.  -LB       User Key  (r) = Recorded By, (t) = Taken By, (c) = Cosigned By    Initials Name Provider Type    Soco Grigsby, PT Physical Therapist          Therapy Education  Education Details: Reviewed floor transfers with pt and his wife. HEP including balancing in a corner with feet even and then semi-tandem with eyes opened and sidestepping at a counter.   Given: HEP  Program: New  How Provided: Verbal, Demonstration, Written  Provided to: Patient, Caregiver  Level of Understanding: Teach back education performed, Verbalized, Demonstrated            Time Calculation:   Start Time: 1016  Stop Time: 1100  Time Calculation (min): 44 min  Total Timed Code Minutes- PT: 44 minute(s)     Therapy Charges for Today     Code Description Service Date Service Provider Modifiers Qty    49176945793  PT NEUROMUSC RE EDUCATION EA 15 MIN 9/3/2020 Soco Duran, PT GP 2    [id number]  PT THERAPEUTIC ACT EA 15 MIN 9/3/2020 Soco Duran, PT GP 1                OP PT Discharge Summary  Date of Discharge: 09/03/20  Reason for Discharge: All goals achieved  Outcomes Achieved: Able to achieve all goals within established timeline  Discharge Destination: Home with home program  Discharge Instructions/Additional Comments: see education    Patient was wearing a face mask during this therapy encounter. Therapist used appropriate personal protective equipment including eye protection, mask, and gloves.  Mask used was standard procedure mask. Appropriate PPE was worn during the entire therapy session. Hand hygiene was completed before and after therapy session. Patient is not in enhanced droplet precautions.         Soco Duran, PT  9/3/2020

## 2020-09-03 NOTE — THERAPY TREATMENT NOTE
Outpatient Speech Language Pathology   Adult Speech Language Cognitive Initial Evaluation  [facility]     Patient Name: Nolan Mccoy Jr.  : 1940  MRN: [id number]  Today's Date: 9/3/2020        Visit Date: 2020   Patient Active Problem List   Diagnosis   • BPH (benign prostatic hyperplasia)   • Hypertension   • Neuropathy   • Screen for colon cancer   • Right bundle branch block (RBBB)   • GERD (gastroesophageal reflux disease)   • Lumbar spinal stenosis   • Spinal stenosis of lumbar region with neurogenic claudication   • Postoperative hypotension   • Postoperative anemia due to acute blood loss   • Thrombocytopenia due to blood loss   • Hospital-acquired pneumonia   • Fever in adult   • TIA (transient ischemic attack)   • Speech disturbance   • Thrombocytopenia (CMS/HCC)   • Abnormal chest x-ray   • CVA (cerebral vascular accident) (CMS/HCC)        Past Medical History:   Diagnosis Date   • BPH (benign prostatic hyperplasia)    • GERD (gastroesophageal reflux disease)    • History of transfusion    • Hypertension    • Right bundle branch block    • Spinal stenosis of lumbar region     NUMBNESS/ TINGLING BILAT FEET, PAIN DOWN LEFT LEG         Past Surgical History:   Procedure Laterality Date   • BACK SURGERY     • COLONOSCOPY     • FRACTURE SURGERY      LEFT LEG COMPOUND FRACTURE AGE 10   • LUMBAR DISCECTOMY FUSION INSTRUMENTATION N/A 2019    Procedure: L2-S1 Laminectomy and Fusion with Instrumentation with Dr. Luong and Dr. Little;  Surgeon: Alex Little MD;  Location: [facility];  Service: Neurosurgery   • LUMBAR LAMINECTOMY DISCECTOMY DECOMPRESSION N/A 2019    Procedure: L2-S1 laminectomy with a fusion by orthopedics;  Surgeon: Adin Luong MD;  Location: [facility];  Service: Neurosurgery   • TONSILLECTOMY      age 7         Visit Dx:    ICD-10-CM ICD-9-CM   1. Cerebrovascular accident (CVA), unspecified mechanism (CMS/HCC) I63.9 434.91   2. Cognitive communication  deficit R41.841 799.52                                  SLP OP Goals     Row Name 09/03/20 1200          Subjective Comments    Subjective Comments  Patient is pleasant and cooperative, reports he looks at family and friends photos and writes down names.   -KA        Subjective Pain    Able to rate subjective pain?  yes  -KA     Pre-Treatment Pain Level  0  -KA     Post-Treatment Pain Level  0  -KA        Memory Goals    Patient will demonstrate improved ability to recall information by immediately recalling a series of words  90%:;unrelated;without cues  -KA     Status: Patient will demonstrate improved ability to recall information by immediately recalling a series of words  Progressing as expected  -KA     Comments: Patient will demonstrate improved ability to recall information by immediately recalling a series of words  delayed recall of three functional errands after 5 and 10 minute delay-100%. Immediate recall of five names 80% without cues and 100% with min cues, and after 5 min delay 60% without cues and 100% after min cues and after 15 minute delay 100% without cues . Immediate recall of picture scene task 80% without cues. Memory matching card task 80% without cues   -KA     Patient’s memory skills will be enhanced as reported by patient by using external memory aides  90%:;without cues  -KA     Status: Patient’s memory skills will be enhanced as reported by patient by using external memory aides  Progressing as expected  -KA     Comments: Patient’s memory skills will be enhanced as reported by patient by using external memory aides  see information above,   -KA     Patient will demonstrate improved ability to recall information by listening to paragraph and answering yes/no questions  90%:;without cues  -KA     Status: Patient will demonstrate improved ability to recall information by listening to paragraph and answering yes/no questions  Progressing as expected  -KA     Comments: Patient will  demonstrate improved ability to recall information by listening to paragraph and answering yes/no questions  reading comprehension of multiparagraph (targeted visual memory instead of auditory due to patient very hard of hearing)=80% accuracy, summarization of information 80% difficulty with word finding at times  -BRENDA       User Key  (r) = Recorded By, (t) = Taken By, (c) = Cosigned By    Initials Name Provider Type    Robbi Stern MA,CCC-SLP Speech and Language Pathologist           Patient was wearing a face mask during this therapy encounter. Therapist used appropriate personal protective equipment including mask, eye protection and gloves.  Mask used was standard procedure mask. Appropriate PPE was worn during the entire therapy session. Hand hygiene was completed before and after therapy session. Patient is not in enhanced droplet precautions.              Time Calculation:   SLP Start Time: 1100  SLP Stop Time: 1145  SLP Time Calculation (min): 45 min    Therapy Charges for Today     Code Description Service Date Service Provider Modifiers Qty    18893640201 HC ST DEV OF COGN SKILLS INITIAL 15 MIN 9/3/2020 Robbi Harp MA,CCC-SLP  1    38102109405 HC ST DEV OF COGN SKILLS EACH ADDT'L 15 MIN 9/3/2020 Robbi Harp MA,CCC-SLP  2                   Robbi Harp MA,CCC-SLP  9/3/2020

## 2020-09-10 ENCOUNTER — APPOINTMENT (OUTPATIENT)
Dept: SPEECH THERAPY | Facility: HOSPITAL | Age: 80
End: 2020-09-10

## 2020-09-10 ENCOUNTER — TELEPHONE (OUTPATIENT)
Dept: ORTHOPEDIC SURGERY | Facility: CLINIC | Age: 80
End: 2020-09-10

## 2020-09-10 ENCOUNTER — APPOINTMENT (OUTPATIENT)
Dept: PHYSICAL THERAPY | Facility: HOSPITAL | Age: 80
End: 2020-09-10

## 2020-09-16 ENCOUNTER — TELEPHONE (OUTPATIENT)
Dept: NEUROLOGY | Facility: CLINIC | Age: 80
End: 2020-09-16

## 2020-09-16 NOTE — TELEPHONE ENCOUNTER
would like to know why her husbands original appointment on 09/16/2020 was cancelled.   She states that  is doing terrible and needs to be seen as soon as possible, October is to far away for her comfort. Please advise.     She is requesting Ervin gives her a call today.

## 2020-09-16 NOTE — TELEPHONE ENCOUNTER
"Pt was scheduled with you today. Wife very upset about moving appt, reporting  is \"doing terrible\". There are multiple phone calls from wife. 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    Thank you  "

## 2020-09-17 ENCOUNTER — HOSPITAL ENCOUNTER (OUTPATIENT)
Dept: SPEECH THERAPY | Facility: HOSPITAL | Age: 80
Setting detail: THERAPIES SERIES
Discharge: HOME | End: 2020-09-17

## 2020-09-17 ENCOUNTER — APPOINTMENT (OUTPATIENT)
Dept: PHYSICAL THERAPY | Facility: HOSPITAL | Age: 80
End: 2020-09-17

## 2020-09-17 DIAGNOSIS — I63.9 CEREBROVASCULAR ACCIDENT (CVA), UNSPECIFIED MECHANISM (HCC): Primary | ICD-10-CM

## 2020-09-17 DIAGNOSIS — R41.841 COGNITIVE COMMUNICATION DEFICIT: ICD-10-CM

## 2020-09-17 PROCEDURE — 97129 THER IVNTJ 1ST 15 MIN: CPT | Performed by: SPEECH-LANGUAGE PATHOLOGIST

## 2020-09-17 PROCEDURE — 97130 THER IVNTJ EA ADDL 15 MIN: CPT | Performed by: SPEECH-LANGUAGE PATHOLOGIST

## 2020-09-17 NOTE — THERAPY DISCHARGE NOTE
Outpatient Speech Language Pathology   Adult Speech Language Cognitive Eval/Discharge  [facility]     Patient Name: Nolan Mccoy Jr.  : 1940  MRN: [id number]  Today's Date: 2020         Visit Date: 2020    Patient Active Problem List   Diagnosis   • BPH (benign prostatic hyperplasia)   • Hypertension   • Neuropathy   • Screen for colon cancer   • Right bundle branch block (RBBB)   • GERD (gastroesophageal reflux disease)   • Lumbar spinal stenosis   • Spinal stenosis of lumbar region with neurogenic claudication   • Postoperative hypotension   • Postoperative anemia due to acute blood loss   • Thrombocytopenia due to blood loss   • Hospital-acquired pneumonia   • Fever in adult   • TIA (transient ischemic attack)   • Speech disturbance   • Thrombocytopenia (CMS/HCC)   • Abnormal chest x-ray   • CVA (cerebral vascular accident) (CMS/HCC)        Past Medical History:   Diagnosis Date   • BPH (benign prostatic hyperplasia)    • GERD (gastroesophageal reflux disease)    • History of transfusion    • Hypertension    • Right bundle branch block    • Spinal stenosis of lumbar region     NUMBNESS/ TINGLING BILAT FEET, PAIN DOWN LEFT LEG         Past Surgical History:   Procedure Laterality Date   • BACK SURGERY     • COLONOSCOPY     • FRACTURE SURGERY      LEFT LEG COMPOUND FRACTURE AGE 10   • LUMBAR DISCECTOMY FUSION INSTRUMENTATION N/A 2019    Procedure: L2-S1 Laminectomy and Fusion with Instrumentation with Dr. Luong and Dr. Little;  Surgeon: Alex Little MD;  Location: [facility];  Service: Neurosurgery   • LUMBAR LAMINECTOMY DISCECTOMY DECOMPRESSION N/A 2019    Procedure: L2-S1 laminectomy with a fusion by orthopedics;  Surgeon: Adin Luong MD;  Location: [facility];  Service: Neurosurgery   • TONSILLECTOMY      age 7         Visit Dx:    ICD-10-CM ICD-9-CM   1. Cerebrovascular accident (CVA), unspecified mechanism (CMS/HCC)  I63.9 434.91   2. Cognitive communication  deficit  R41.841 799.52           SLP [facility] - 09/17/20 1200        CLQT (The Cognitive Linguistic Quick Test)    Attention Domain Score  198   -KA    Attention Severity Rating  4: WNL   -KA    Memory Domain Score  146   -KA    Memory Severity Rating  4: WNL   -KA    Executive Function Domain Score  28   -KA    Executive Function Severity Rating  4: WNL   -KA    Language Domain Score  26   -KA    Language Severity Rating  4: WNL   -KA    Visuospatial Domain Score  94   -KA    Visuospatial Severity Rating  4: WNL   -KA    Clock Drawing Total Score  13   -KA    Clock Drawing Severity Rating  WNL   -KA    Composite Severity Rating  4.0   -KA    Composite Severity Rating Range  4.0 - 3.5: WNL   -KA    CLQT Comments  SLP readministered CLQT today with patient demonstrating improvement with performance and scored WNL in each domain and did not score below the criterion cut off for any tests. Patient score increased in personal facts (able to recall address), symbol cancellation, story retelling, symbol trails, and mazes. Today, pt wife communicated frustration, and stated pt needed to see Neurologist due to his worsening memory (pt has appointent with Nurse practioner next week). Wife reported pt's memory was worse and feels it is due to medications. However patient stated to SLP he feels his memory has improved. Patient only complaint with memory remains he has trouble at times remembering names, and this includes family members and friends he does not see often. Patient is using strategies including rehearsal and name association and practices strategies while looking at pictures of this friends and families. SLP also discussed age related cognitive-communication changes with aging. Overall pt demonstrates functional cognitive communication skills and d/c today. SLP also administed SLUMs and pt scored 26/30. Patient and wife agreeable with d/c today, and overall pt reports he is doing very well at home. Patient  and wife are concerned patient's medications have altered his memory skills with being able to recall names. SLP discussed with patient and wife recommend therapy in the future if patient experiences decline or changes with memory and if it is  not contributed by possible medications. Pt and wife pleasant and receptive to education completed today and plan.   -KA      User Key  (r) = Recorded By, (t) = Taken By, (c) = Cosigned By    Initials Name Provider Type    Robbi Stern MA,[facility]SLP Speech and Language Pathologist                        OP SLP Education     Row Name 09/17/20 1258       Education    Barriers to Learning  No barriers identified  -KA    Assessed  Learning needs;Learning motivation  -KA    Learning Motivation  Strong  -KA    Learning Method  Explanation  -KA    Teaching Response  Verbalized understanding  -KA      User Key  (r) = Recorded By, (t) = Taken By, (c) = Cosigned By    Initials Name Effective Dates    Robbi Stern MA,CCC-SLP 06/08/18 -           SLP OP Goals     Row Name 09/17/20 1200          Subjective Comments    Subjective Comments  SLP completed re-evaluation today   -KA        Subjective Pain    Able to rate subjective pain?  yes  -KA     Pre-Treatment Pain Level  0  -KA     Post-Treatment Pain Level  0  -KA        Written Language Expression Goals    Patient will be able to use written language skills to communicate effectively in all situations  90%:;without cues  -KA     Status: Patient will be able to use written language skills to communicate effectively in all situations  Achieved  -KA     Comments: Patient will be able to use written language skills to communicate effectively in all situations  Patient demonstrated functional written expression skills  -KA     Comments: Patient will improve written language skills by writing connected sentences related to topic provided  see previous notes   -KA        Verbal Expression Goals    Patient will be able to use verbal  expressive language skills to communicate effectively in all situations with unfamiliar listener  90%:;without cues  -KA     Status: Patient will be able to use verbal expressive language skills to communicate effectively in all situations with unfamiliar listener  Achieved  -KA     Comments: Patient will be able to use verbal expressive language skills to communicate effectively in all situations with unfamiliar listener  Patient demonstrated functional verbal expression skills at the conversation level  -KA     Patient will improve verbal expressive language skills by completing divergent naming tasks  90%:;without cues  -KA     Status: Patient will improve verbal expressive language skills by completing divergent naming tasks  -- achieved  -KA     Comments: Patient will improve verbal expressive language skills by completing divergent naming tasks  pt averaged 14 within concrete category during re-evaluatio   -KA        Auditory Comprehension Goals    Status: Patient will improve comprehension of spoken language by following Multi-step directions  Discontinued  -KA        Memory Goals    Status: Patient will be able to remember information needed to participate in avocational activities  Achieved  -KA     Comments: Patient will be able to remember information needed to participate in avocational activities  Functional memory with use of strategies   -KA     Status: Patient will demonstrate improved ability to recall information by immediately recalling a series of words  -- partially achieved  -KA     Comments: Patient will demonstrate improved ability to recall information by immediately recalling a series of words  see previous documentation   -KA     Patient’s memory skills will be enhanced as reported by patient by using external memory aides  90%:;without cues  -KA     Status: Patient’s memory skills will be enhanced as reported by patient by using external memory aides  Achieved  -KA     Comments: Patient’s  memory skills will be enhanced as reported by patient by using external memory aides  Patient has notebook to write down important information and uses pictures to rehearse and practice family and friends names  -KA     Patient will demonstrate improved ability to recall information by listening to paragraph and answering yes/no questions  90%:;without cues  -KA     Status: Patient will demonstrate improved ability to recall information by listening to paragraph and answering yes/no questions  -- partially achieved  -KA     Comments: Patient will demonstrate improved ability to recall information by listening to paragraph and answering yes/no questions  AVERAGED 80%  -KA        Problem Solving Goals    Status: Patient will improve ability to analyze problems and determine solutions by completing a visual representation/filling in a chart by following  written directions  Discontinued  -KA       User Key  (r) = Recorded By, (t) = Taken By, (c) = Cosigned By    Initials Name Provider Type    Robbi Stern MA,CCC-SLP Speech and Language Pathologist          OP SLP Assessment/Plan - 09/17/20 1258        SLP Assessment    Functional Problems  Speech Language- Adult/Cognition   -KA    Clinical Impression: Speech Language-Adult/Congnition  Cognitive Communication WFL   -KA      User Key  (r) = Recorded By, (t) = Taken By, (c) = Cosigned By    Initials Name Provider Type    Robbi Stern MA,CCC-SLP Speech and Language Pathologist                 Time Calculation:   SLP Start Time: 1100  SLP Stop Time: 1155  SLP Time Calculation (min): 55 min    Therapy Charges for Today     Code Description Service Date Service Provider Modifiers Qty    25741260459 HC ST DEV OF COGN SKILLS INITIAL 15 MIN 9/17/2020 Robbi Harp MA,CCC-SLP  1    34021541618 HC ST DEV OF COGN SKILLS EACH ADDT'L 15 MIN 9/17/2020 Robbi Harp MA,CCC-SLP  3               OP SLP Discharge Summary  Date of Discharge: 09/17/20  Reason for Discharge:  all goals and outcomes met, no further needs identified  Progress Toward Achieving Short/long Term Goals: all goals met within established timelines  Discharge Destination: home      Robbi Harp MA,CCC-SLP  9/17/2020

## 2020-09-22 ENCOUNTER — OFFICE VISIT (OUTPATIENT)
Dept: NEUROLOGY | Facility: CLINIC | Age: 80
End: 2020-09-22

## 2020-09-22 ENCOUNTER — TELEPHONE (OUTPATIENT)
Dept: NEUROLOGY | Facility: CLINIC | Age: 80
End: 2020-09-22

## 2020-09-22 ENCOUNTER — LAB (OUTPATIENT)
Dept: LAB | Facility: HOSPITAL | Age: 80
End: 2020-09-22

## 2020-09-22 VITALS
BODY MASS INDEX: 28.7 KG/M2 | SYSTOLIC BLOOD PRESSURE: 100 MMHG | WEIGHT: 205 LBS | HEIGHT: 71 IN | OXYGEN SATURATION: 97 % | HEART RATE: 75 BPM | DIASTOLIC BLOOD PRESSURE: 70 MMHG

## 2020-09-22 DIAGNOSIS — G60.9 IDIOPATHIC NEUROPATHY: ICD-10-CM

## 2020-09-22 DIAGNOSIS — I63.9 CEREBROVASCULAR ACCIDENT (CVA), UNSPECIFIED MECHANISM (HCC): ICD-10-CM

## 2020-09-22 DIAGNOSIS — G31.84 MCI (MILD COGNITIVE IMPAIRMENT): Primary | ICD-10-CM

## 2020-09-22 LAB
ERYTHROCYTE [SEDIMENTATION RATE] IN BLOOD: 3 MM/HR (ref 0–20)
FOLATE SERPL-MCNC: >20 NG/ML (ref 4.78–24.2)
RPR SER QL: NORMAL
T4 FREE SERPL-MCNC: 1.01 NG/DL (ref 0.93–1.7)
TSH SERPL DL<=0.05 MIU/L-ACNC: 3.1 UIU/ML (ref 0.27–4.2)

## 2020-09-22 PROCEDURE — 82595 ASSAY OF CRYOGLOBULIN: CPT

## 2020-09-22 PROCEDURE — 83825 ASSAY OF MERCURY: CPT | Performed by: NURSE PRACTITIONER

## 2020-09-22 PROCEDURE — 84165 PROTEIN E-PHORESIS SERUM: CPT

## 2020-09-22 PROCEDURE — 99215 OFFICE O/P EST HI 40 MIN: CPT | Performed by: NURSE PRACTITIONER

## 2020-09-22 PROCEDURE — 84443 ASSAY THYROID STIM HORMONE: CPT | Performed by: NURSE PRACTITIONER

## 2020-09-22 PROCEDURE — 36415 COLL VENOUS BLD VENIPUNCTURE: CPT

## 2020-09-22 PROCEDURE — 84439 ASSAY OF FREE THYROXINE: CPT | Performed by: NURSE PRACTITIONER

## 2020-09-22 PROCEDURE — 85652 RBC SED RATE AUTOMATED: CPT

## 2020-09-22 PROCEDURE — 82784 ASSAY IGA/IGD/IGG/IGM EACH: CPT

## 2020-09-22 PROCEDURE — 82175 ASSAY OF ARSENIC: CPT | Performed by: NURSE PRACTITIONER

## 2020-09-22 PROCEDURE — 84155 ASSAY OF PROTEIN SERUM: CPT

## 2020-09-22 PROCEDURE — 83655 ASSAY OF LEAD: CPT | Performed by: NURSE PRACTITIONER

## 2020-09-22 PROCEDURE — 82746 ASSAY OF FOLIC ACID SERUM: CPT

## 2020-09-22 PROCEDURE — 86592 SYPHILIS TEST NON-TREP QUAL: CPT

## 2020-09-22 PROCEDURE — 86334 IMMUNOFIX E-PHORESIS SERUM: CPT

## 2020-09-22 RX ORDER — CLOPIDOGREL BISULFATE 75 MG/1
75 TABLET ORAL DAILY
COMMUNITY
End: 2020-10-12

## 2020-09-22 RX ORDER — ASPIRIN 81 MG/1
81 TABLET ORAL DAILY
COMMUNITY

## 2020-09-22 RX ORDER — GABAPENTIN 300 MG/1
1 CAPSULE ORAL 3 TIMES DAILY
COMMUNITY
Start: 2020-09-09 | End: 2020-09-22

## 2020-09-22 RX ORDER — ATORVASTATIN CALCIUM 80 MG/1
80 TABLET, FILM COATED ORAL DAILY
COMMUNITY
End: 2020-09-22

## 2020-09-22 NOTE — PROGRESS NOTES
CC: Follow-up stroke.  Memory loss, new complaint.    HPI:  Nolan Mccoy Jr. is a  79 y.o.  right-handed male with HTN, BPH, and chronic low back pain with associated neuropathy who is being seen in follow-up today for stroke and memory loss.  He is accompanied by his wife.  The patient presented to [facility] on 7/22 with facial droop and right-sided weakness as well as difficulty communicating which occurred while he was on a Zoom video meeting.  NIH was initially 8 per EMS.  When Dr. Alford saw him NIH was 0 so TPA was deferred.  CTA head/neck showed right vertebral artery that appeared stenotic but it was not well seen.  There was also a plaque moderately narrowing left vertebral artery at the origin and the CTA was otherwise unremarkable. MRI of the brain with and without contrast showed a small acute infarct in the left internal capsule.  There was also mild small vessel disease.2D echo showed EF of 61 to 65%, normal left atrial size, and saline test results were negative.  His LDL was 95 and his hemoglobin A1c was 5.0%.  He did have a platelet count of 118.  He was started on aspirin 81 mg daily and Plavix 75 mg daily.  He was recommended to continue DAPT x90 days then stop Plavix and continue aspirin indefinitely.  He was also started on Lipitor 80 mg.  ZIO Patch was ordered but I do not see where this was completed.  No further TIA/stroke symptoms.    His wife called in in August and reported memory loss since the patient was started on atorvastatin 80 mg.  I recommended changing to Crestor 10 mg but due to ongoing memory difficulty the Crestor was ultimately discontinued as well. The patient notes ongoing problems remembering people/names since his stroke.  They deny any issues with memory prior to his stroke or prior to taking statin.  He was discharged from speech therapy and the speech therapist noted increased and normal score when she assessed the cognitive linguistic quick test.   Patient also notes difficulty when working on his computer.    He has had symptoms of neuropathy at least 5 years, worse in the last 2 years. He notes he has symptoms of burning and numbness in his feet and lower legs just fairly constant.  He also has associated balance problems.  He denies any symptoms in his hands or upper extremities.  He recently increased his gabapentin 300 mg, 2 pills TID which has helped some.  He also uses a topical cream which is helpful.  He does not believe he is ever had an EMG/NCS and I do not see one in our system.  He has previously had a vascular work-up which was reportedly normal and I do see where he had normal bilateral ABIs in 2020.     He had an L2-S1 laminectomy and fusion by Dr. Luong and Dr. Little in 2019 in hopes that this would improve his leg symptoms however it did not.  He was last seen in the neurosurgery office in 2020 and possibility of getting an EMG was raised.  His B12 was borderline low at 392 in .    The patient Previosly worked as a  at [employer] around . He has a bachelors degree and some additional college.    Mother had had memory problems,  at 70. 3 of his mother's siblings had dementia which started in early 70s after traumatic events.  1 of his sisters has neuropathy but she is also diabetic.  Sister  with Hodgkins a young age. No history of seizure, meningitis, or head injury.    He does not smoke and drinks alcohol rarely.  Past Medical History:   Diagnosis Date   • Arthritis    • BPH (benign prostatic hyperplasia)    • Cataracts, bilateral    • Fractures    • GERD (gastroesophageal reflux disease)    • History of transfusion    • HL (hearing loss)    • Hypertension    • Numbness    • Peripheral neuropathy    • Right bundle branch block    • Spinal stenosis of lumbar region     NUMBNESS/ TINGLING BILAT FEET, PAIN DOWN LEFT LEG    • Stroke (CMS/HCC)          Past Surgical History:   Procedure  Laterality Date   • BACK SURGERY     • COLONOSCOPY     • FRACTURE SURGERY      LEFT LEG COMPOUND FRACTURE AGE 10   • LUMBAR DISCECTOMY FUSION INSTRUMENTATION N/A 8/12/2019    Procedure: L2-S1 Laminectomy and Fusion with Instrumentation with Dr. Luong and Dr. Little;  Surgeon: Alex Little MD;  Location: [facility];  Service: Neurosurgery   • LUMBAR LAMINECTOMY DISCECTOMY DECOMPRESSION N/A 8/12/2019    Procedure: L2-S1 laminectomy with a fusion by orthopedics;  Surgeon: Adin Luong MD;  Location: [facility];  Service: Neurosurgery   • TONSILLECTOMY      age 7           Current Outpatient Medications:   •  acetaminophen (TYLENOL) 325 MG tablet, Take 650 mg by mouth Every 6 (Six) Hours As Needed for Mild Pain ., Disp: , Rfl:   •  acetaminophen (TYLENOL) 325 MG tablet, Take 650 mg by mouth Every 6 (Six) Hours As Needed for Mild Pain ., Disp: , Rfl:   •  aspirin 81 MG EC tablet, Take 81 mg by mouth Daily., Disp: , Rfl:   •  Ca Phosphate-Cholecalciferol (CALCIUM/VITAMIN D3 GUMMIES PO), Take  by mouth., Disp: , Rfl:   •  calcium carbonate (TUMS) 500 MG chewable tablet, Chew 1 tablet Every 4 (Four) Hours As Needed for Indigestion or Heartburn., Disp: , Rfl:   •  CHOLECALCIFEROL PO, Take  by mouth., Disp: , Rfl:   •  clopidogrel (PLAVIX) 75 MG tablet, Take 75 mg by mouth Daily., Disp: , Rfl:   •  dutasteride (AVODART) 0.5 MG capsule, Take 0.5 mg by mouth Daily., Disp: , Rfl:   •  dutasteride (AVODART) 0.5 MG capsule, Take 0.5 mg by mouth Daily., Disp: , Rfl:   •  gabapentin (NEURONTIN) 600 MG tablet, Take 1 tablet by mouth 3 (Three) Times a Day., Disp: 90 tablet, Rfl: 2  •  lisinopril (PRINIVIL,ZESTRIL) 5 MG tablet, Take 5 mg by mouth Daily., Disp: , Rfl:   •  Multiple Vitamins-Minerals (MULTIVITAMIN WITH MINERALS) tablet, Take 1 tablet by mouth Daily., Disp: , Rfl:   •  Multiple Vitamins-Minerals (MULTIVITAMIN WITH MINERALS) tablet, Take 1 tablet by mouth Daily., Disp: , Rfl:   •  omeprazole (priLOSEC) 20  MG capsule, Take 20 mg by mouth Daily., Disp: , Rfl:   •  tamsulosin (FLOMAX) 0.4 MG capsule 24 hr capsule, Take 1 capsule by mouth Daily., Disp: , Rfl:   •  vitamin D (ERGOCALCIFEROL) 1.25 MG (03091 UT) capsule capsule, Take 50,000 Units by mouth 1 (One) Time Per Week., Disp: , Rfl:   •  Arginine 1000 MG tablet, Take 1,000 mg by mouth Daily. HELD FOR OR, Disp: , Rfl:   •  atorvastatin (LIPITOR) 80 MG tablet, Take 80 mg by mouth Daily., Disp: , Rfl:   •  gabapentin (NEURONTIN) 300 MG capsule, 1 capsule 3 (Three) Times a Day., Disp: , Rfl:   •  lisinopril (PRINIVIL,ZESTRIL) 5 MG tablet, Take 2.5 mg by mouth Daily., Disp: , Rfl:   •  Omega-3 Fatty Acids (FISH OIL) 1000 MG capsule capsule, Take 1,000 mg by mouth Daily With Breakfast. HELD FOR OR, Disp: , Rfl:   •  omeprazole (priLOSEC) 20 MG capsule, Take 20 mg by mouth Daily., Disp: , Rfl:   •  rosuvastatin (Crestor) 10 MG tablet, Take 1 tablet by mouth Every Night., Disp: 30 tablet, Rfl: 11  •  tamsulosin (FLOMAX) 0.4 MG capsule 24 hr capsule, Take 1 capsule by mouth Every Night., Disp: , Rfl:       Family History   Problem Relation Age of Onset   • Hypertension Father    • Neuropathy Father    • Diabetes Sister    • Alzheimer's disease Mother    • Cancer Son    • Malig Hyperthermia Neg Hx          Social History     Socioeconomic History   • Marital status:      Spouse name: Not on file   • Number of children: Not on file   • Years of education: Not on file   • Highest education level: Not on file   Tobacco Use   • Smoking status: Never Smoker   • Smokeless tobacco: Never Used   Substance and Sexual Activity   • Alcohol use: Yes     Alcohol/week: 1.0 standard drinks     Types: 1 Cans of beer per week     Comment: 1 drink every other day occasionally   • Drug use: Never     Comment: CBD Oil   • Sexual activity: Defer   Social History Narrative    ** Merged History Encounter **              Allergies   Allergen Reactions   • Sulfa Antibiotics Rash   •  "Penicillins Other (See Comments)     Tolerated cefazolin August 2019  CAUSED RASH BACK OF THROAT   • Sulfa Antibiotics Hives             ROS:  Review of Systems   Constitutional: Negative for activity change, appetite change and unexpected weight change.   HENT: Negative for facial swelling, trouble swallowing and voice change.    Eyes: Negative for photophobia, pain and visual disturbance.   Respiratory: Negative for chest tightness, shortness of breath and wheezing.    Cardiovascular: Negative for chest pain, palpitations and leg swelling.   Gastrointestinal: Negative for abdominal pain, nausea and vomiting.   Endocrine: Negative for polydipsia and polyphagia.   Musculoskeletal: Positive for gait problem. Negative for arthralgias, back pain, joint swelling, myalgias, neck pain and neck stiffness.   Neurological: Negative for dizziness, tremors, seizures, syncope, facial asymmetry, speech difficulty, weakness, light-headedness, numbness and headaches.   Hematological: Does not bruise/bleed easily.   Psychiatric/Behavioral: Positive for confusion and decreased concentration. Negative for agitation, behavioral problems, dysphoric mood, hallucinations, self-injury, sleep disturbance and suicidal ideas. The patient is not nervous/anxious and is not hyperactive.      ROS completed by MA reviewed by me and agree.    Physical Exam:  Vitals:    09/22/20 1103   BP: 100/70   BP Location: Left arm   Patient Position: Sitting   Cuff Size: Adult   Pulse: 75   SpO2: 97%   Weight: 93 kg (205 lb)   Height: 180.3 cm (71\")     Orthostatic BP:    Body mass index is 28.59 kg/m².    He scored 3 on stop bang questionnaire for gender, age over 50, and hypertension.  He scored 1 on the PHQ-9.  He scored 7 on the CNS-LS for PBA.  General appearance: Well developed, well nourished, well groomed, alert and cooperative.   HEENT: Normocephalic.   Neck and spine: Normal range of motion. Normal alignment. No mass or tenderness.    Cardiac: " Regular rate and rhythm. No murmurs.   Peripheral Vasculature: Radial pulses are equal and symmetric.  Chest Exam: Clear to auscultation bilaterally, no wheezes, no rhonchi.  Extremities: Normal, no edema.   Skin: No rashes or birthmarks.     Higher integrative function: Oriented 3 out of 6 on the [place].  He was oriented to the month, face, and city.  He scored 19 out of 30 on the [place].  He lost points on naming, repetition, fluency, and delayed recall.  He was only able to name 7F words.  CN II: Normal visual fields.   CN III IV VI: Extraocular movements are full without nystagmus. Pupils are equal, round, and reactive to light. No relative afferent pupillary defect. No internuclear ophthalmoplegia.   CN V: Normal facial sensation.  CN VII: Facial movements are symmetric, no weakness.   CN VIII: Auditory acuity is decreased to finger rub bilaterally.  CN IX & X: Symmetric palatal movement.   CN XI: Sternocleidomastoid and trapezius are normal. No weakness.   CN XII: The tongue is midline. No atrophy or fasciculations.   Motor: Normal muscle strength, bulk, and tone in upper and lower extremities. No fasciculations, rigidity, spasticity or abnormal movements.   Sensation: Light touch decreased in ankles and feet bilaterally.  Vibration and pinprick present at the ankles  Station and gait: Broad-based gait.  Unable to walk on toes or heels without balance difficulty.  Muscle stretch reflexes: Reflexes are normal and symmetric in the upper and lower extremities.   Coordination: Finger to nose test showed no dysmetria. Rapid alternating movements were normal. Heel to shin normal.       Results:      Lab Results   Component Value Date    GLUCOSE 94 07/22/2020    BUN 11 08/06/2020    CREATININE 1.0 08/06/2020    EGFRIFNONA 63 07/22/2020    BCR 11.0 08/06/2020    CO2 28 08/06/2020    CALCIUM 9.0 08/06/2020    ALBUMIN 4.0 08/06/2020    LABIL2 1.5 08/06/2020    AST 28 08/06/2020    ALT 24 08/06/2020       Lab Results    Component Value Date    WBC 6.63 07/23/2020    HGB 14.0 07/23/2020    HCT 40.8 07/23/2020    MCV 94.7 07/23/2020     (L) 07/23/2020         .  Lab Results   Component Value Date    RPR Non-Reactive 09/22/2020         Lab Results   Component Value Date    TSH 3.100 09/22/2020         Lab Results   Component Value Date    EDLUDZEH54 392 08/15/2019         Lab Results   Component Value Date    FOLATE >20.00 09/22/2020         Lab Results   Component Value Date    HGBA1C 5.6 08/06/2020         Lab Results   Component Value Date    GLUCOSE 94 07/22/2020    BUN 11 08/06/2020    CREATININE 1.0 08/06/2020    EGFRIFNONA 63 07/22/2020    BCR 11.0 08/06/2020    K 5.0 08/06/2020    CO2 28 08/06/2020    CALCIUM 9.0 08/06/2020    ALBUMIN 4.0 08/06/2020    LABIL2 1.5 08/06/2020    AST 28 08/06/2020    ALT 24 08/06/2020         Lab Results   Component Value Date    WBC 6.63 07/23/2020    HGB 14.0 07/23/2020    HCT 40.8 07/23/2020    MCV 94.7 07/23/2020     (L) 07/23/2020             Assessment:   1.  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  2.  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  Idiopathic peripheral neuropathy  4.  Lumbar stenosis status post L2-S1 laminectomy/fusion      Plan:  Continue aspirin 81 mg indefinitely.  Stop Plavix 10/22  Do not restart statin due to complaints of memory loss after starting statin.  Goal BP less than 130/80, goal hemoglobin A1c less than 6.5%.  Recommend adding B complex vitamins for low B12, can recheck in the future.  Referral to [facility] for neuropsych testing  We will check additional labs for treatable causes of peripheral neuropathy, consider EMG/NCS in the future.  When I call him with lab results we will discuss outpatient cardiac monitoring for further evaluation of previous stroke.  Continue gabapentin 600 mg 3 times daily for painful peripheral neuropathy.  Follow-up with me in 4 to 6 months, after neuropsych testing with,  with repeat Sharkey.  Plan on starting Aricept once Sharkey score is 17 or less.                        Dictated utilizing Dragon dictation.

## 2020-09-22 NOTE — TELEPHONE ENCOUNTER
I see that you ordered labs. Was there anything else you were needing to order? I will call pt with lab/imaging instructions.

## 2020-09-22 NOTE — TELEPHONE ENCOUNTER
PT'S WIFE LANDON SHORT CALLING BACK TO REMIND CARMEN PANTOJA (D/T BENJAMIN LOVE'S COMPUTER WASN'T WORKING) TO ORDER THE TEST FOR THE NEUROPATHY. PLEASE CALL PT BACK WITH THE APPT FOR THE TEST -[phone]

## 2020-09-23 LAB
ALBUMIN SERPL ELPH-MCNC: 3.8 G/DL (ref 2.9–4.4)
ALBUMIN/GLOB SERPL: 1.4 {RATIO} (ref 0.7–1.7)
ALPHA1 GLOB SERPL ELPH-MCNC: 0.2 G/DL (ref 0–0.4)
ALPHA2 GLOB SERPL ELPH-MCNC: 0.9 G/DL (ref 0.4–1)
B-GLOBULIN SERPL ELPH-MCNC: 0.8 G/DL (ref 0.7–1.3)
GAMMA GLOB SERPL ELPH-MCNC: 1 G/DL (ref 0.4–1.8)
GLOBULIN SER-MCNC: 2.9 G/DL (ref 2.2–3.9)
IGA SERPL-MCNC: 169 MG/DL (ref 61–437)
IGG SERPL-MCNC: 1069 MG/DL (ref 603–1613)
IGM SERPL-MCNC: 102 MG/DL (ref 15–143)
INTERPRETATION SERPL IEP-IMP: NORMAL
LABORATORY COMMENT REPORT: NORMAL
M PROTEIN SERPL ELPH-MCNC: NORMAL G/DL
PROT SERPL-MCNC: 6.7 G/DL (ref 6–8.5)

## 2020-09-24 ENCOUNTER — APPOINTMENT (OUTPATIENT)
Dept: PHYSICAL THERAPY | Facility: HOSPITAL | Age: 80
End: 2020-09-24

## 2020-09-24 ENCOUNTER — APPOINTMENT (OUTPATIENT)
Dept: SPEECH THERAPY | Facility: HOSPITAL | Age: 80
End: 2020-09-24

## 2020-09-26 LAB
ARSENIC BLD-MCNC: 7 UG/L (ref 2–23)
LEAD BLDV-MCNC: 1 UG/DL (ref 0–4)
MERCURY BLD-MCNC: <1 UG/L (ref 0–14.9)

## 2020-09-28 LAB — CRYOGLOB SER QL 1D COLD INC: NORMAL

## 2020-09-29 ENCOUNTER — DOCUMENTATION (OUTPATIENT)
Dept: NEUROLOGY | Facility: CLINIC | Age: 80
End: 2020-09-29

## 2020-09-29 DIAGNOSIS — R42 DIZZINESS: Primary | ICD-10-CM

## 2020-09-29 NOTE — PROGRESS NOTES
Labs for treatable causes of peripheral neuropathy were unrevealing. Also, in reviewing his chart I noticed Holter monitor was recommended to be completed at the time of his stroke but was not done. Will order outpatient Holter monitor. Labs and holter were d/w his wife and she verbalized understanding.

## 2020-10-01 ENCOUNTER — APPOINTMENT (OUTPATIENT)
Dept: SPEECH THERAPY | Facility: HOSPITAL | Age: 80
End: 2020-10-01

## 2020-10-01 ENCOUNTER — APPOINTMENT (OUTPATIENT)
Dept: PHYSICAL THERAPY | Facility: HOSPITAL | Age: 80
End: 2020-10-01

## 2020-10-08 ENCOUNTER — APPOINTMENT (OUTPATIENT)
Dept: PHYSICAL THERAPY | Facility: HOSPITAL | Age: 80
End: 2020-10-08

## 2020-10-08 ENCOUNTER — APPOINTMENT (OUTPATIENT)
Dept: SPEECH THERAPY | Facility: HOSPITAL | Age: 80
End: 2020-10-08

## 2020-10-12 RX ORDER — CLOPIDOGREL BISULFATE 75 MG/1
TABLET ORAL
Qty: 30 TABLET | Refills: 0 | Status: SHIPPED | OUTPATIENT
Start: 2020-10-12 | End: 2021-04-09

## 2020-10-12 NOTE — TELEPHONE ENCOUNTER
Per office note 09/22/20: Continue aspirin 81 mg indefinitely.  Stop Plavix 10/22.  Okay to refill for quantity 10?    Thank you

## 2020-10-15 ENCOUNTER — APPOINTMENT (OUTPATIENT)
Dept: SPEECH THERAPY | Facility: HOSPITAL | Age: 80
End: 2020-10-15

## 2020-10-15 ENCOUNTER — APPOINTMENT (OUTPATIENT)
Dept: PHYSICAL THERAPY | Facility: HOSPITAL | Age: 80
End: 2020-10-15

## 2020-10-20 ENCOUNTER — TELEPHONE (OUTPATIENT)
Dept: NEUROLOGY | Facility: CLINIC | Age: 80
End: 2020-10-20

## 2020-10-20 DIAGNOSIS — I47.29 NSVT (NONSUSTAINED VENTRICULAR TACHYCARDIA) (HCC): Primary | ICD-10-CM

## 2020-10-20 NOTE — TELEPHONE ENCOUNTER
Dr Powell called to review holter findings. The patient had atrial tachycardia and a 22 beat run of wife complex tachy cardia and recommends referral to cardiology for further evaluation. I spoke with patient's wife regarding result and she requests Dr Frost as her son recently had an ablation by him. Referral placed.

## 2020-11-17 ENCOUNTER — TELEPHONE (OUTPATIENT)
Dept: NEUROLOGY | Facility: CLINIC | Age: 80
End: 2020-11-17

## 2020-11-17 NOTE — TELEPHONE ENCOUNTER
PTS WIFE CALLED IN STATING PTS PRIMARY CARE DOCTOR PUT HIM ON A NEW MEDICINE ezetimibe (ZETIA) 10 MG tablet ON October 12, 2020. PT IS NOW EXPERIENCING MEMORY LOSS. PT CALLED PRIMARY CARE DOCTOR AND WAS TOLD TO CALL BENJAMIN LOVE TO SEE IF SHE THINKS ITS A GOOD IDEA TO TAKE PT OFF OF THIS MEDICATION DUE TO THE MEMORY LOSS SIDE EFFECT, PRIMARY CARE DOCTOR STATED IF BENJAMIN LOVE ADVISES TO DISCONTINUE TAKING THIS MEDICATION THEN THEY WILL.     PLEASE ADVISE   [place] - PTS WIFE  [phone]

## 2020-11-17 NOTE — TELEPHONE ENCOUNTER
Notified wife no research resulted in support of memory loss caused by Zetia. Wife sts she, too, did her own research and did find an article saying it was rare but, pts have reported memory loss on Zetia. I offered recommendation of d/c for a few days, watch for change and report. Wife verbalized understanding.

## 2020-12-16 ENCOUNTER — OFFICE VISIT (OUTPATIENT)
Dept: CARDIOLOGY | Facility: CLINIC | Age: 80
End: 2020-12-16

## 2020-12-16 VITALS
WEIGHT: 203 LBS | BODY MASS INDEX: 28.42 KG/M2 | DIASTOLIC BLOOD PRESSURE: 82 MMHG | HEIGHT: 71 IN | SYSTOLIC BLOOD PRESSURE: 138 MMHG | HEART RATE: 69 BPM

## 2020-12-16 DIAGNOSIS — D69.6 THROMBOCYTOPENIA (HCC): ICD-10-CM

## 2020-12-16 DIAGNOSIS — I63.9 CEREBROVASCULAR ACCIDENT (CVA), UNSPECIFIED MECHANISM (HCC): ICD-10-CM

## 2020-12-16 DIAGNOSIS — I10 ESSENTIAL HYPERTENSION: ICD-10-CM

## 2020-12-16 DIAGNOSIS — I45.10 RIGHT BUNDLE BRANCH BLOCK (RBBB): Primary | ICD-10-CM

## 2020-12-16 DIAGNOSIS — I47.1 ATRIAL TACHYCARDIA (HCC): ICD-10-CM

## 2020-12-16 PROBLEM — I47.19 ATRIAL TACHYCARDIA: Status: ACTIVE | Noted: 2020-12-16

## 2020-12-16 PROCEDURE — 93000 ELECTROCARDIOGRAM COMPLETE: CPT | Performed by: INTERNAL MEDICINE

## 2020-12-16 PROCEDURE — 99204 OFFICE O/P NEW MOD 45 MIN: CPT | Performed by: INTERNAL MEDICINE

## 2020-12-16 RX ORDER — EZETIMIBE 10 MG/1
10 TABLET ORAL DAILY
COMMUNITY
Start: 2020-10-12 | End: 2021-11-18

## 2020-12-16 NOTE — PROGRESS NOTES
Date of Office Visit: 2020  Encounter Provider: Sulaiman Frost MD  Place of Service: [facility] CARDIOLOGY  Patient Name: Nolan Mccoy Jr.  : 1940    Subjective:     Encounter Date:2020      Patient ID: Nolan Mccoy Jr. is a 79 y.o. male who has a cc of  Mild CVA and is referred by Neuro for an abnormal holter.     He has rare palp. -- mostly b/c he listens to his heart.     The holter showed some nonspecific AT and NSVT (slowish) and no AF    He has lower extremity neuropathy     The CVA this summer was speech issue     No anginal chest pain,   No sig li,   No soa,   No fainting,  No orthostasis.   No edema.   Exercise tolerance: limited by neuropathy     There have been no hospital admission since the last visit.     There have been no bleeding events.       Past Medical History:   Diagnosis Date   • Arthritis    • BPH (benign prostatic hyperplasia)    • Cataracts, bilateral    • Fractures    • GERD (gastroesophageal reflux disease)    • History of transfusion    • HL (hearing loss)    • Hypertension    • Numbness    • Peripheral neuropathy    • Right bundle branch block    • Spinal stenosis of lumbar region     NUMBNESS/ TINGLING BILAT FEET, PAIN DOWN LEFT LEG    • Stroke (CMS/HCC)        Social History     Socioeconomic History   • Marital status:      Spouse name: Not on file   • Number of children: Not on file   • Years of education: Not on file   • Highest education level: Not on file   Tobacco Use   • Smoking status: Never Smoker   • Smokeless tobacco: Never Used   Substance and Sexual Activity   • Alcohol use: Yes     Alcohol/week: 1.0 standard drinks     Types: 1 Cans of beer per week     Comment: 1 drink every other day occasionally   • Drug use: Never     Comment: CBD Oil   • Sexual activity: Defer   Social History Narrative    ** Merged History Encounter **            Family History   Problem Relation Age of Onset   • Hypertension Father    •  "Neuropathy Father    • Diabetes Sister    • Alzheimer's disease Mother    • Cancer Son    • Malig Hyperthermia Neg Hx        Review of Systems   Constitution: Negative for fever and night sweats.   HENT: Negative for ear pain and stridor.    Eyes: Negative for discharge and visual halos.   Cardiovascular: Negative for cyanosis.   Respiratory: Negative for hemoptysis and sputum production.    Hematologic/Lymphatic: Negative for adenopathy.   Skin: Negative for nail changes and unusual hair distribution.   Musculoskeletal: Negative for gout and joint swelling.   Gastrointestinal: Negative for bowel incontinence and flatus.   Genitourinary: Negative for dysuria and flank pain.   Neurological: Positive for loss of balance, numbness and paresthesias. Negative for seizures and tremors.   Psychiatric/Behavioral: Negative for altered mental status. The patient is not nervous/anxious.             Objective:     Vitals:    12/16/20 1100   BP: 138/82   Pulse: 69   Weight: 92.1 kg (203 lb)   Height: 179.1 cm (70.5\")         Eyes:      General:         Right eye: No discharge.         Left eye: No discharge.   HENT:      Head: Normocephalic and atraumatic.   Neck:      Thyroid: No thyromegaly.      Vascular: No JVD.   Pulmonary:      Effort: Pulmonary effort is normal.      Breath sounds: Normal breath sounds. No rales.   Cardiovascular:      Normal rate. Regular rhythm.      No gallop.   Edema:     Peripheral edema absent.   Abdominal:      General: Bowel sounds are normal.      Palpations: Abdomen is soft.      Tenderness: There is no abdominal tenderness.   Musculoskeletal: Normal range of motion.         General: No deformity.   Skin:     General: Skin is warm and dry.      Findings: No erythema.   Neurological:      Mental Status: Alert and oriented to person, place, and time.      Motor: Normal muscle tone.   Psychiatric:         Behavior: Behavior normal.         Thought Content: Thought content normal.           ECG 12 " Lead    Date/Time: 12/16/2020 11:31 AM  Performed by: Sulaiman Frost MD  Authorized by: Sulaiman Frost MD   Comparison: compared with previous ECG   Similar to previous ECG  Rhythm: sinus rhythm  Conduction: right bundle branch block    Clinical impression: abnormal EKG            Lab Review:       Assessment:          Diagnosis Plan   1. Right bundle branch block (RBBB)     2. Essential hypertension     3. Cerebrovascular accident (CVA), unspecified mechanism (CMS/HCC)     4. Atrial tachycardia (CMS/HCC)     5. Thrombocytopenia (CMS/HCC)            Plan:     There was no documented AF. No symptoms of AF    I spoke with Denilson (neuro radiologist) and he can confirms this was due to small vessel atherosclerotic disease.     Thus I would rec antiplatelet therapy, risk factor reduction, and statins. He is currently on asa but perhaps clopidogrel would be better.         He has been on statins but may have had trouble.     I would keep trying to get him on statin -- to my history it's not clear that he had side effects from the statin     No indic for oral anticoagulation

## 2020-12-22 ENCOUNTER — TELEPHONE (OUTPATIENT)
Dept: NEUROLOGY | Facility: CLINIC | Age: 80
End: 2020-12-22

## 2020-12-22 ENCOUNTER — OFFICE VISIT (OUTPATIENT)
Dept: ORTHOPEDIC SURGERY | Facility: CLINIC | Age: 80
End: 2020-12-22

## 2020-12-22 VITALS — BODY MASS INDEX: 28.43 KG/M2 | TEMPERATURE: 98 F | HEIGHT: 71 IN | WEIGHT: 203.04 LBS

## 2020-12-22 DIAGNOSIS — Z98.1 S/P LUMBAR FUSION: Primary | ICD-10-CM

## 2020-12-22 DIAGNOSIS — M54.50 LUMBAR BACK PAIN: ICD-10-CM

## 2020-12-22 PROCEDURE — 72100 X-RAY EXAM L-S SPINE 2/3 VWS: CPT | Performed by: ORTHOPAEDIC SURGERY

## 2020-12-22 PROCEDURE — 99213 OFFICE O/P EST LOW 20 MIN: CPT | Performed by: ORTHOPAEDIC SURGERY

## 2020-12-22 NOTE — TELEPHONE ENCOUNTER
PATIENT'S WIFE CALLED AND STATED DR. NASCIMENTO HAD TOLD HER HE WAS GOING TO CALL BENJAMIN AND LET HER KNOW THAT CHRIS SHOULD BE ON A STATIN RX AND TO CALL OUR OFFICE IF SHE HADN'T HEARD FROM BENJAMIN LOVE. SHE ALSO SAID  PUT CHRIS ON EZETIMIBE 10MG.    PLEASE ADVISE.    PH: [phone]

## 2020-12-22 NOTE — PROGRESS NOTES
He had a stroke earlier this year and seems a little confused as to why is here.  Today he does have some back pain mild discomfort to palpation there is nothing remarkable good strength in the legs.  2 view x-rays suggest a solid fusion.  I do not see anything else to do at this point he does have an old healed fracture a couple or 3 levels above the fusion unchanged from December I recommended some physical therapy and will see him back as needed   Heart Transplant Team Following: f/u recs   -C/w everolimus 0.75mg BID (level Goal 8-10)   -C/w Prednisone taper   -C/w posaconazole for ppx  -Off Cellcept while on high dose steroids. Will eventually need to start Cellcept as steroids weaned.

## 2020-12-23 ENCOUNTER — DOCUMENTATION (OUTPATIENT)
Dept: NEUROLOGY | Facility: CLINIC | Age: 80
End: 2020-12-23

## 2020-12-23 DIAGNOSIS — I63.81 LEFT THALAMIC INFARCTION (HCC): Primary | ICD-10-CM

## 2020-12-23 RX ORDER — ROSUVASTATIN CALCIUM 5 MG/1
5 TABLET, COATED ORAL NIGHTLY
Qty: 30 TABLET | Refills: 2 | Status: SHIPPED | OUTPATIENT
Start: 2020-12-23 | End: 2021-04-09

## 2020-12-23 NOTE — PROGRESS NOTES
Wife called. Now interested in having patient take a statin again after speaking with Dr Frost. Will start crestor 5 mg. Repeat CMP/LFTs in 3 months. Wife verbalized understanding.

## 2020-12-30 ENCOUNTER — TREATMENT (OUTPATIENT)
Dept: PHYSICAL THERAPY | Facility: CLINIC | Age: 80
End: 2020-12-30

## 2020-12-30 DIAGNOSIS — M54.16 RADICULOPATHY, LUMBAR REGION: Primary | ICD-10-CM

## 2020-12-30 DIAGNOSIS — M79.605 LEG PAIN, BILATERAL: ICD-10-CM

## 2020-12-30 DIAGNOSIS — R26.89 POOR BALANCE: ICD-10-CM

## 2020-12-30 DIAGNOSIS — M79.604 LEG PAIN, BILATERAL: ICD-10-CM

## 2020-12-30 PROCEDURE — 97530 THERAPEUTIC ACTIVITIES: CPT | Performed by: PHYSICAL THERAPIST

## 2020-12-30 PROCEDURE — 97161 PT EVAL LOW COMPLEX 20 MIN: CPT | Performed by: PHYSICAL THERAPIST

## 2020-12-30 NOTE — PROGRESS NOTES
"Physical Therapy Initial Evaluation and Plan of Care    Patient: Nolan Mccoy Jr.   : 1940  Diagnosis/ICD-10 Code:  Radiculopathy, lumbar region [M54.16]  Referring practitioner: Alex Little MD  Past medical Hx reviewed:20  Subjective Evaluation    History of Present Illness  Date of surgery: 2019  Mechanism of injury: I have some trouble with my legs but it has been caused by my back.  I had surgery on the back and had a lumbar fusion and laminectomy (L2-S1) back in 2019.  I was doing okay but I fell about 6 months ago and that seems to be when my leg symptoms seemed to have come back.  I was trying to get dressed after the shower and didn't sit down and I fell.  I really hurt my tail bone area and it is much better now but hurt for a very long time.  I didn't go to the doctor after the fall.  I took some time to see if it would heal.    I haven't been using a cane or anything.  I don't seem to need one I just don't do as much activity as I normally do.  I just notice more R leg numbness in the outside of the toes.  I do take gabapentin for my nerves and that seems to help.   I do feel like my balance is okay but I can notice as my medication wears off, I feel a little more unsteady.  I can get around okay overall.  The L leg has more pain.  I don't really have pain in the back but the legs are the main issue.  I don't trust my legs at times. I have good motion in the joints though.  I can walk about 1/2 mile before the legs start to bother me.         Patient Occupation: Retired  Quality of life: good    Pain  Current pain rating: 3 (Lumbar.  At rest leg pain:  2-3/10)  At best pain ratin  At worst pain ratin (from prolonged walking knee down. )  Location: \"tail bone area\" lower back.    Quality: tenderness.  Relieving factors: medications, rest, relaxation, change in position and heat  Aggravating factors: prolonged positioning, repetitive movement, ambulation, lifting and " stairs  Progression: no change    Social Support  Lives in: one-story house (Basement with TV room)  Lives with: spouse    Treatments  Previous treatment: physical therapy  Patient Goals  Patient goals for therapy: independence with ADLs/IADLs, increased strength, decreased pain, improved balance and increased motion      PLOF: Independent, Hx of lumbar fusion.  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    Objective          Active Range of Motion     Lumbar   Flexion: 40 (in sitting) degrees   Extension: 10 degrees   Left lateral flexion: 25 degrees   Right lateral flexion: 15 degrees   Left rotation: 45 degrees   Right rotation: 60 degrees     Strength/Myotome Testing     Left Hip   Planes of Motion   Flexion: 4-  Extension: 4  Abduction: 4+  Adduction: 5    Right Hip   Planes of Motion   Flexion: 4  Extension: 4  Abduction: 4+  Adduction: 5    Left Knee   Flexion: 4+  Extension: 4+    Right Knee   Flexion: 4+  Extension: 4+    Left Ankle/Foot   Dorsiflexion: 4  Plantar flexion: 4    Right Ankle/Foot   Dorsiflexion: 4  Plantar flexion: 4    Ambulation   Weight-Bearing Status   Weight-Bearing Status (Left): weight-bearing as tolerated   Weight-Bearing Status (Right): weight-bearing as tolerated      Ambulation: Level Surfaces   Ambulation with assistive device: independent    Observational Gait   Decreased walking speed and stride length.     Functional Assessment     Comments  5 x sit to stand:  18.38 sec.  (with B UE assist)  No increased pain/symptoms   TU.68 sec (independent)   Balance: LOB with firm sternal nudge, Lateral nudge L to R, Forward nudge.    LOB with Narrow ELSA on Foam, eyes open and eyes closed.         Assessment & Plan     Assessment  Impairments: abnormal or restricted ROM, activity intolerance, impaired physical strength, lacks appropriate home exercise program and pain with function  Assessment details: Pt presents to PT with symptoms consistent with some neurologic  compression originating in the lumbar spine exacerbated by prolonged standing/walking.  Of note, his balance has suffered secondary to nerve involvement and neuropathy.  Pt would benefit from skilled PT intervention to address the deficits noted.     Prognosis: good  Functional Limitations: carrying objects, lifting, sleeping, walking, uncomfortable because of pain, moving in bed, sitting and unable to perform repetitive tasks  Goals  Plan Goals: SHORT TERM GOALS: Time for Goal Achievement: 3 weeks    1.  Patient to be compliant and progression of HEP                             2.  Pain level < 3/10 at worst with mentioned activities to improve function  3.  Increased thoracic, lumbar and SIJ mobility to allow for increased lumbar AROM with less pain.  4.  Increased lumbar AROM to by 25% in all planes to allow for increased ease with sit-stand transfers and functional activities    LONG TERM GOALS: Time for Goal Achievement: 4-5 weeks  1.  Pt. to score < 10 % on Back Index  2.  Pain level < 2/10 with all listed activities to return to normal.  3.  Lumbar AROM to WFL to allow for return to household & recreational activities w/o increased symptoms  4.  (B) LE and lower abdominal strength to 5/5 (except: DF 4/5)  to allow for pushing, pulling and activities to occur without pain (driving, sitting, household  & Job requirements)        Plan  Therapy options: will be seen for skilled physical therapy services  Planned modality interventions: cryotherapy, electrical stimulation/Russian stimulation, TENS, thermotherapy (hydrocollator packs) and ultrasound  Other planned modality interventions: Dry Needling  Planned therapy interventions: body mechanics training, flexibility, functional ROM exercises, home exercise program, manual therapy, neuromuscular re-education, postural training, spinal/joint mobilization, stretching, strengthening and soft tissue mobilization  Frequency: 2x week  Duration in visits: 16  Treatment  plan discussed with: patient    Manual Therapy:    -     mins  69815;  Therapeutic Exercise:    -     mins  83781;     Neuromuscular Arcelia:    -    mins  59277;    Therapeutic Activity:     16     mins  33004;   Pt education of dx, prognosis, plan of care and Kiah. & Phys  Gait Training:      -     mins  00408;     Ultrasound:     -     mins  96924;    Electrical Stimulation:    -     mins  19022 ( );  Dry Needling     -     mins self-pay    Timed Treatment:   -   mins   Total Treatment:     60   mins    PT SIGNATURE:  Jermaine Briceño DPT, PT     Jermaine Briceño PT   [place] License #: [id number]    DATE TREATMENT INITIATED: 12/31/2020    Medicare Initial Certification  Certification Period: 3/31/2021  I certify that the therapy services are furnished while this patient is under my care.  The services outlined above are required by this patient, and will be reviewed every 90 days.     PHYSICIAN: Alxe Little MD      DATE:     Please sign and return via fax to [phone].. Thank you, [facility].

## 2021-01-06 ENCOUNTER — TREATMENT (OUTPATIENT)
Dept: PHYSICAL THERAPY | Facility: CLINIC | Age: 81
End: 2021-01-06

## 2021-01-06 DIAGNOSIS — M79.605 LEG PAIN, BILATERAL: ICD-10-CM

## 2021-01-06 DIAGNOSIS — R26.89 POOR BALANCE: ICD-10-CM

## 2021-01-06 DIAGNOSIS — M79.604 LEG PAIN, BILATERAL: ICD-10-CM

## 2021-01-06 DIAGNOSIS — M54.16 RADICULOPATHY, LUMBAR REGION: Primary | ICD-10-CM

## 2021-01-06 PROCEDURE — 97110 THERAPEUTIC EXERCISES: CPT | Performed by: PHYSICAL THERAPIST

## 2021-01-06 NOTE — PROGRESS NOTES
Physical Therapy Daily Progress Note      Patient: Nolan Mccoy Jr.   : 1940  Diagnosis/ICD-10 Code:  Radiculopathy, lumbar region [M54.16]  Referring practitioner: Alex Little MD  Date of Initial Visit: Type: THERAPY  Noted: 2020  Today's Date: 2021  Patient seen for 2 sessions    Subjective : Nolan Mccoy reports: No new complaints.  I didn't have any exercises from last visit.     Objective:   See Exercise, Manual, and Modality Logs for complete treatment.     Assessment/Plan:Pt tolerated initiation of HEP well but did require heavier verbal and tactile cues for correct form.      Progress per Plan of Care and Progress strengthening /stabilization /functional activity     Manual Therapy:    -     mins  72730;  Therapeutic Exercise:    53     mins  75014;     Neuromuscular Arcelia:    -    mins  90265;    Therapeutic Activity:     -     mins  01078;     Gait Training:      -     mins  53688;     Ultrasound:     -     mins  35967;    Electrical Stimulation:    -     mins  35839 ( );  Dry Needling     -     mins self-pay    Timed Treatment:   53   mins   Total Treatment:     65  mins      MIRZA Churchill License #[id number]    Physical Therapist

## 2021-01-08 ENCOUNTER — TREATMENT (OUTPATIENT)
Dept: PHYSICAL THERAPY | Facility: CLINIC | Age: 81
End: 2021-01-08

## 2021-01-08 DIAGNOSIS — M79.604 LEG PAIN, BILATERAL: ICD-10-CM

## 2021-01-08 DIAGNOSIS — M79.605 LEG PAIN, BILATERAL: ICD-10-CM

## 2021-01-08 DIAGNOSIS — M54.16 RADICULOPATHY, LUMBAR REGION: Primary | ICD-10-CM

## 2021-01-08 DIAGNOSIS — R26.89 POOR BALANCE: ICD-10-CM

## 2021-01-08 PROCEDURE — 97110 THERAPEUTIC EXERCISES: CPT | Performed by: PHYSICAL THERAPIST

## 2021-01-08 NOTE — PROGRESS NOTES
Physical Therapy Daily Progress Note  Visit # 3      Subjective   Nolan Mccoy Jr. reports:   I feel better overall.  It's hard for me to pinpoint what is better, but I know I just feel better in my back and legs today.  I do want to try and strengthen my legs also.          Objective   See Exercise, Manual, and Modality Logs for complete treatment.   Added bridging, SLR, S/L hip abd to program to progress LE strengthening.      Assessment & Plan     Assessment  Assessment details:   Tolerated progression of therapeutic exercise well today, no increased pain reported during or after exercises, although does note fatigue during LE exercises and general fatigue at conclusion of sesison.               Progress per Plan of Care and Progress strengthening /stabilization /functional activity           Timed:         Manual Therapy:         mins  48582     Therapeutic Exercise:     55    mins  52381     Neuromuscular Arcelia:        mins  81669    Therapeutic Activity:          mins  48214     Gait Training:           mins  05091     Ultrasound:          mins  79851    Ionto                                   mins  59207  Self Care                            mins  18450    Un-Timed:  Electrical Stimulation:         mins 28178 ( )  Traction          mins 35133    Timed Treatment:   55   mins   Total Treatment:     55   mins    BISHOP Lemon License #[id number]  Physical Therapist Assistant

## 2021-01-12 ENCOUNTER — TREATMENT (OUTPATIENT)
Dept: PHYSICAL THERAPY | Facility: CLINIC | Age: 81
End: 2021-01-12

## 2021-01-12 DIAGNOSIS — M54.16 RADICULOPATHY, LUMBAR REGION: Primary | ICD-10-CM

## 2021-01-12 DIAGNOSIS — M79.605 LEG PAIN, BILATERAL: ICD-10-CM

## 2021-01-12 DIAGNOSIS — R26.89 POOR BALANCE: ICD-10-CM

## 2021-01-12 DIAGNOSIS — M79.604 LEG PAIN, BILATERAL: ICD-10-CM

## 2021-01-12 PROCEDURE — 97110 THERAPEUTIC EXERCISES: CPT | Performed by: PHYSICAL THERAPIST

## 2021-01-12 NOTE — PROGRESS NOTES
Physical Therapy Daily Progress Note  Visit # 4      Subjective   Nolan Mccoy Jr. reports:   I might be feeling some improvement since coming in, I'm not sure.  I still have symptoms from my knees into my feet, feels like a burning feeling right now.        Objective   See Exercise, Manual, and Modality Logs for complete treatment.     Reviewed current HEP, continued therex program as noted.  Added active HS stretch and nerve glide from 90/90 position to HEP, written instructions issued ([employer] code [id number]).      Assessment & Plan     Assessment  Assessment details: Pt notes rapid fatigue in LEs during exercise, frequent rest breaks required.  Frequent cueing for proper exercise performance needed, poor recall of exercise to this point.              Progress per Plan of Care and Progress strengthening /stabilization /functional activity           Timed:         Manual Therapy:         mins  61165     Therapeutic Exercise:     55   mins  76696     Neuromuscular Arcelia:        mins  93976    Therapeutic Activity:          mins  51548     Gait Training:           mins  78152     Ultrasound:          mins  65191    Ionto                                   mins  38486  Self Care                            mins  38284    Un-Timed:  Electrical Stimulation:         mins 07131 ( )  Traction          mins 01863    Timed Treatment:   55   mins   Total Treatment:     65   mins    Migue De La Cruz PTA  [place] License #[id number]  Physical Therapist Assistant

## 2021-01-13 ENCOUNTER — TELEPHONE (OUTPATIENT)
Dept: NEUROLOGY | Facility: CLINIC | Age: 81
End: 2021-01-13

## 2021-01-13 NOTE — TELEPHONE ENCOUNTER
Called to see if pt was wanting to schedule for neuropsych testing. Referral sent to [facility] & [facility] but, has not been scheduled yet, per staff at [facility]'s office. Had to leave message to return call.

## 2021-01-14 ENCOUNTER — TREATMENT (OUTPATIENT)
Dept: PHYSICAL THERAPY | Facility: CLINIC | Age: 81
End: 2021-01-14

## 2021-01-14 DIAGNOSIS — M54.16 RADICULOPATHY, LUMBAR REGION: Primary | ICD-10-CM

## 2021-01-14 DIAGNOSIS — M79.605 LEG PAIN, BILATERAL: ICD-10-CM

## 2021-01-14 DIAGNOSIS — M79.604 LEG PAIN, BILATERAL: ICD-10-CM

## 2021-01-14 DIAGNOSIS — R26.89 POOR BALANCE: ICD-10-CM

## 2021-01-14 PROCEDURE — 97110 THERAPEUTIC EXERCISES: CPT | Performed by: PHYSICAL THERAPIST

## 2021-01-14 NOTE — PROGRESS NOTES
Physical Therapy Daily Progress Note  Visit # 5      Subjective   Nolan SUDHAKAR Bassmike Dekcer reports:   I'm feeling better today.  I got out of bed this morning with less pain.      Objective   See Exercise, Manual, and Modality Logs for complete treatment.     Reviewed current HEP, continued therex program as noted.  Concluded with [facility] application to back in sitting    Assessment & Plan     Assessment  Assessment details: Improved tolerance with exercise today vs last visit, less overall fatigue in LEs noted.  Does continue to need cueing for proper exercise performance needed, limited recall of exercise to this point.              Progress per Plan of Care and Progress strengthening /stabilization /functional activity           Timed:         Manual Therapy:         mins  79508     Therapeutic Exercise:     55   mins  41611     Neuromuscular Arcelia:        mins  08932    Therapeutic Activity:          mins  31044     Gait Training:           mins  36804     Ultrasound:          mins  26184    Ionto                                   mins  87279  Self Care                            mins  91158    Un-Timed:  Electrical Stimulation:         mins 31526 ( )  Traction          mins 82202    Timed Treatment:   55   mins   Total Treatment:     65   mins    Migue De La Cruz PTA  [place] License #[id number]  Physical Therapist Assistant

## 2021-01-18 ENCOUNTER — TELEPHONE (OUTPATIENT)
Dept: NEUROLOGY | Facility: CLINIC | Age: 81
End: 2021-01-18

## 2021-01-18 ENCOUNTER — TREATMENT (OUTPATIENT)
Dept: PHYSICAL THERAPY | Facility: CLINIC | Age: 81
End: 2021-01-18

## 2021-01-18 DIAGNOSIS — M54.16 RADICULOPATHY, LUMBAR REGION: Primary | ICD-10-CM

## 2021-01-18 DIAGNOSIS — M79.605 LEG PAIN, BILATERAL: ICD-10-CM

## 2021-01-18 DIAGNOSIS — M79.604 LEG PAIN, BILATERAL: ICD-10-CM

## 2021-01-18 DIAGNOSIS — R26.89 POOR BALANCE: ICD-10-CM

## 2021-01-18 PROCEDURE — 97140 MANUAL THERAPY 1/> REGIONS: CPT | Performed by: PHYSICAL THERAPIST

## 2021-01-18 PROCEDURE — 97110 THERAPEUTIC EXERCISES: CPT | Performed by: PHYSICAL THERAPIST

## 2021-01-18 NOTE — TELEPHONE ENCOUNTER
Patient called answering service on 1/16/2021 to reschedule his follow up with CARMEN Carlos that is scheduled for 1/21/2021.  Attempted to reach patient to reschedule.  Left message to call back.

## 2021-01-18 NOTE — PROGRESS NOTES
Physical Therapy Daily Progress Note      Patient: Nolan Mccoy Jr.   : 1940  Diagnosis/ICD-10 Code:  No primary diagnosis found.  Referring practitioner: Alex Little MD  Date of Initial Visit: Type: THERAPY  Noted: 2020  Today's Date: 2021  Patient seen for 6 sessions    Subjective : Nolan Mccoy reports: Things are some better but still having the pain in the legs.      Objective: Noted some (B) foot drop with gait, less eccentric control of forefoot.  See Exercise, Manual, and Modality Logs for complete treatment.     Assessment/Plan:Pt was encouraged to stick with his program because he is having some improving symptoms in a relatively short period of time.  Pt was educated that his symptoms may not completely resolve but improvement is better than ongoing pain and dysfunction.      Progress per Plan of Care and Progress strengthening /stabilization /functional activity     Manual Therapy:    12     mins  35649;  Therapeutic Exercise:    42     mins  87583;     Neuromuscular Arcelia:    -    mins  47358;    Therapeutic Activity:     --     mins  38309;     Gait Training:      -     mins  08753;     Ultrasound:     -     mins  29292;    Electrical Stimulation:    -     mins  53992 ( );  Dry Needling     -     mins self-pay    Timed Treatment:   54   mins   Total Treatment:     60   mins      MIRZA Churchill License #[id number]    Physical Therapist

## 2021-01-20 ENCOUNTER — TREATMENT (OUTPATIENT)
Dept: PHYSICAL THERAPY | Facility: CLINIC | Age: 81
End: 2021-01-20

## 2021-01-20 DIAGNOSIS — R26.89 POOR BALANCE: ICD-10-CM

## 2021-01-20 DIAGNOSIS — M79.604 LEG PAIN, BILATERAL: ICD-10-CM

## 2021-01-20 DIAGNOSIS — M54.16 RADICULOPATHY, LUMBAR REGION: Primary | ICD-10-CM

## 2021-01-20 DIAGNOSIS — M79.605 LEG PAIN, BILATERAL: ICD-10-CM

## 2021-01-20 PROCEDURE — 97110 THERAPEUTIC EXERCISES: CPT | Performed by: PHYSICAL THERAPIST

## 2021-01-20 PROCEDURE — 97140 MANUAL THERAPY 1/> REGIONS: CPT | Performed by: PHYSICAL THERAPIST

## 2021-01-20 NOTE — PROGRESS NOTES
Physical Therapy Daily Progress Note      Patient: Nolan Mccoy Jr.   : 1940  Diagnosis/ICD-10 Code:  Radiculopathy, lumbar region [M54.16]  Referring practitioner: Alex Little MD  Date of Initial Visit: Type: THERAPY  Noted: 2020  Today's Date: 2021  Patient seen for 7 sessions    Subjective : Nolan Mccoy reports: No new complaints today.  Doing okay overall.     Objective:   See Exercise, Manual, and Modality Logs for complete treatment.     Assessment/Plan:Pt still has to receive cues for exercise routine and reminders of sets and reps, slowing his sequence of activity. He did respond well to manual therapy for (B) lumbar spine.      Progress per Plan of Care and Progress strengthening /stabilization /functional activity     Manual Therapy:    14     mins  28541;  Therapeutic Exercise:    40     mins  14054;     Neuromuscular Arcelia:    -    mins  21553;    Therapeutic Activity:     -     mins  51736;     Gait Training:      -     mins  31478;     Ultrasound:     --     mins  90076;    Electrical Stimulation:    -     mins  88767 ( );  Dry Needling     -     mins self-pay    Timed Treatment:   54   mins   Total Treatment:     60   mins      MIRZA Churchill License #[id number]    Physical Therapist

## 2021-01-25 ENCOUNTER — TREATMENT (OUTPATIENT)
Dept: PHYSICAL THERAPY | Facility: CLINIC | Age: 81
End: 2021-01-25

## 2021-01-25 DIAGNOSIS — R26.89 POOR BALANCE: ICD-10-CM

## 2021-01-25 DIAGNOSIS — M79.604 LEG PAIN, BILATERAL: ICD-10-CM

## 2021-01-25 DIAGNOSIS — M54.16 RADICULOPATHY, LUMBAR REGION: Primary | ICD-10-CM

## 2021-01-25 DIAGNOSIS — M79.605 LEG PAIN, BILATERAL: ICD-10-CM

## 2021-01-25 PROCEDURE — 97140 MANUAL THERAPY 1/> REGIONS: CPT | Performed by: PHYSICAL THERAPIST

## 2021-01-25 PROCEDURE — 97110 THERAPEUTIC EXERCISES: CPT | Performed by: PHYSICAL THERAPIST

## 2021-01-25 NOTE — PROGRESS NOTES
Physical Therapy Daily Progress Note      Patient: Nolan Mccoy Jr.   : 1940  Diagnosis/ICD-10 Code:  Radiculopathy, lumbar region [M54.16]  Referring practitioner: Alex Little MD  Date of Initial Visit: Type: THERAPY  Noted: 2020  Today's Date: 2021  Patient seen for 8 sessions    Subjective : Nolan Mccoy reports: I am doing fine today.  No new complaints to speak of     Objective:   See Exercise, Manual, and Modality Logs for complete treatment.     Assessment/Plan:Today's session was modified to include progression of WBing activity and stability training at the onset of the session and then stretching and table based activity last. He tolerated treatment modification well.      Progress per Plan of Care and Progress strengthening /stabilization /functional activity     Manual Therapy:    12     mins  46758;  Therapeutic Exercise:    45     mins  07742;     Neuromuscular Arcelia:    -    mins  96730;    Therapeutic Activity:     -     mins  63645;     Gait Training:      -     mins  14203;     Ultrasound:     -     mins  50957;    Electrical Stimulation:    -     mins  08884 ( );  Dry Needling     -     mins self-pay    Timed Treatment:   57   mins   Total Treatment:     60   mins      MIRZA Churchill License #[id number]    Physical Therapist

## 2021-01-27 ENCOUNTER — TREATMENT (OUTPATIENT)
Dept: PHYSICAL THERAPY | Facility: CLINIC | Age: 81
End: 2021-01-27

## 2021-01-27 DIAGNOSIS — M79.604 LEG PAIN, BILATERAL: ICD-10-CM

## 2021-01-27 DIAGNOSIS — M54.16 RADICULOPATHY, LUMBAR REGION: Primary | ICD-10-CM

## 2021-01-27 DIAGNOSIS — R26.89 POOR BALANCE: ICD-10-CM

## 2021-01-27 DIAGNOSIS — M79.605 LEG PAIN, BILATERAL: ICD-10-CM

## 2021-01-27 PROCEDURE — 97110 THERAPEUTIC EXERCISES: CPT | Performed by: PHYSICAL THERAPIST

## 2021-01-27 PROCEDURE — 97530 THERAPEUTIC ACTIVITIES: CPT | Performed by: PHYSICAL THERAPIST

## 2021-01-27 NOTE — PROGRESS NOTES
Physical Therapy Discharge Note      Patient: Nolan Mccoy Jr.   : 1940  Diagnosis/ICD-10 Code:  Radiculopathy, lumbar region [M54.16]  Referring practitioner: Alex Little MD  Date of Initial Visit: Type: THERAPY  Noted: 2020  Today's Date: 2021  Patient seen for 9 sessions    Subjective : Nolan Mccoy reports: I did a lot at home yesterday and was sore and tired after.  We are preparing to sell property and did a half a days worth of packing, moving etc.      Objective:      Active Range of Motion      Lumbar   Flexion: 40 (in sitting) degrees (60 deg)   Extension: 10 degrees (15 deg)   Left lateral flexion: 25 degrees (20 deg)   Right lateral flexion: 15 degrees (15 deg)   Left rotation: 45 degrees (55 deg)   Right rotation: 60 degrees (65 deg)      Strength/Myotome Testing      Left Hip   Planes of Motion   Flexion: 4- (5-/5)  Extension: 4 (4+/5)   Abduction: 4+ (4+/5)   Adduction: 5     Right Hip   Planes of Motion   Flexion: 4 (5/5)   Extension: 4 (4+/5)   Abduction: 4+(5-/5)   Adduction: 5     Left Knee   Flexion: 4+ (5/5)  Extension: 4+ (5/5)      Right Knee   Flexion: 4+ (5/5)   Extension: 4+ (5/5)      Left Ankle/Foot   Dorsiflexion: 4 (4+/5)   Plantar flexion: 4 (4+/5)     Right Ankle/Foot   Dorsiflexion: 4 (4/5)   Plantar flexion: 4 (4+/5)      Ambulation   Weight-Bearing Status   Weight-Bearing Status (Left): weight-bearing as tolerated   Weight-Bearing Status (Right): weight-bearing as tolerated        Ambulation: Level Surfaces   Ambulation with assistive device: independent     Observational Gait   Decreased walking speed and stride length.   Flexibility:  90/90 hamstring:  L: 150 deg R:140  Hip ER: L: 50 deg, R: 40     Functional Assessment      Comments  5 x sit to stand:  18.38 sec.  (with B UE assist)  No increased pain/symptoms (14.85 sec with UE, 12.30 without UE)   TU.68 sec (independent) (11.5 sec, noted some decreased eccentric DF control)     Balance: LOB with  firm sternal nudge, Lateral nudge L to R, Forward nudge.    LOB with Narrow ELSA on Foam, eyes open and eyes closed.       See Exercise, Manual, and Modality Logs for complete treatment.     Assessment/Plan:Delon has shown good improvement with his overall function, ROM and LE strength.  He reports having the ability to do much more than before starting program.  His knees and lower legs can still be a bother though.       Manual Therapy:    -     mins  56544;  Therapeutic Exercise:    35     mins  13590;     Neuromuscular Arcelia:    -    mins  72147;    Therapeutic Activity:     24     mins  39805;   Tests, measures and discharge planning.    Gait Training:      -     mins  48510;     Ultrasound:     -     mins  21265;    Electrical Stimulation:    --     mins  89051 ( );  Dry Needling     -     mins self-pay    Timed Treatment:   59   mins   Total Treatment:     65   mins      MIRZA Churchill License #[id number]    Physical Therapist

## 2021-03-11 ENCOUNTER — HOSPITAL ENCOUNTER (EMERGENCY)
Facility: HOSPITAL | Age: 81
Discharge: HOME | End: 2021-03-11
Attending: EMERGENCY MEDICINE | Admitting: EMERGENCY MEDICINE

## 2021-03-11 ENCOUNTER — APPOINTMENT (OUTPATIENT)
Dept: CT IMAGING | Facility: HOSPITAL | Age: 81
End: 2021-03-11

## 2021-03-11 VITALS
WEIGHT: 205 LBS | HEART RATE: 68 BPM | BODY MASS INDEX: 28.7 KG/M2 | SYSTOLIC BLOOD PRESSURE: 108 MMHG | RESPIRATION RATE: 17 BRPM | HEIGHT: 71 IN | TEMPERATURE: 96.7 F | DIASTOLIC BLOOD PRESSURE: 61 MMHG | OXYGEN SATURATION: 96 %

## 2021-03-11 DIAGNOSIS — R10.9 FLANK PAIN, ACUTE: Primary | ICD-10-CM

## 2021-03-11 DIAGNOSIS — S22.080A CLOSED WEDGE COMPRESSION FRACTURE OF T11 VERTEBRA, INITIAL ENCOUNTER (HCC): ICD-10-CM

## 2021-03-11 LAB
ALBUMIN SERPL-MCNC: 3.9 G/DL (ref 3.5–5.2)
ALBUMIN/GLOB SERPL: 1.4 G/DL
ALP SERPL-CCNC: 68 U/L (ref 39–117)
ALT SERPL W P-5'-P-CCNC: 21 U/L (ref 1–41)
ANION GAP SERPL CALCULATED.3IONS-SCNC: 8.5 MMOL/L (ref 5–15)
AST SERPL-CCNC: 25 U/L (ref 1–40)
BASOPHILS # BLD AUTO: 0.02 10*3/MM3 (ref 0–0.2)
BASOPHILS NFR BLD AUTO: 0.3 % (ref 0–1.5)
BILIRUB SERPL-MCNC: 0.5 MG/DL (ref 0–1.2)
BILIRUB UR QL STRIP: NEGATIVE
BUN SERPL-MCNC: 13 MG/DL (ref 8–23)
BUN/CREAT SERPL: 11.8 (ref 7–25)
CALCIUM SPEC-SCNC: 9.2 MG/DL (ref 8.6–10.5)
CHLORIDE SERPL-SCNC: 101 MMOL/L (ref 98–107)
CLARITY UR: CLEAR
CO2 SERPL-SCNC: 26.5 MMOL/L (ref 22–29)
COLOR UR: ABNORMAL
CREAT SERPL-MCNC: 1.1 MG/DL (ref 0.76–1.27)
DEPRECATED RDW RBC AUTO: 47 FL (ref 37–54)
EOSINOPHIL # BLD AUTO: 0.07 10*3/MM3 (ref 0–0.4)
EOSINOPHIL NFR BLD AUTO: 1.2 % (ref 0.3–6.2)
ERYTHROCYTE [DISTWIDTH] IN BLOOD BY AUTOMATED COUNT: 14.2 % (ref 12.3–15.4)
GFR SERPL CREATININE-BSD FRML MDRD: 65 ML/MIN/1.73
GLOBULIN UR ELPH-MCNC: 2.8 GM/DL
GLUCOSE SERPL-MCNC: 94 MG/DL (ref 65–99)
GLUCOSE UR STRIP-MCNC: NEGATIVE MG/DL
HCT VFR BLD AUTO: 45.4 % (ref 37.5–51)
HGB BLD-MCNC: 16.2 G/DL (ref 13–17.7)
HGB UR QL STRIP.AUTO: NEGATIVE
KETONES UR QL STRIP: NEGATIVE
LEUKOCYTE ESTERASE UR QL STRIP.AUTO: NEGATIVE
LIPASE SERPL-CCNC: 25 U/L (ref 13–60)
LYMPHOCYTES # BLD AUTO: 0.69 10*3/MM3 (ref 0.7–3.1)
LYMPHOCYTES NFR BLD AUTO: 12 % (ref 19.6–45.3)
MCH RBC QN AUTO: 32.7 PG (ref 26.6–33)
MCHC RBC AUTO-ENTMCNC: 35.7 G/DL (ref 31.5–35.7)
MCV RBC AUTO: 91.5 FL (ref 79–97)
MONOCYTES # BLD AUTO: 0.54 10*3/MM3 (ref 0.1–0.9)
MONOCYTES NFR BLD AUTO: 9.4 % (ref 5–12)
NEUTROPHILS NFR BLD AUTO: 4.41 10*3/MM3 (ref 1.7–7)
NEUTROPHILS NFR BLD AUTO: 76.8 % (ref 42.7–76)
NITRITE UR QL STRIP: NEGATIVE
PH UR STRIP.AUTO: 7.5 [PH] (ref 5–8)
PLATELET # BLD AUTO: 133 10*3/MM3 (ref 140–450)
PMV BLD AUTO: 10.9 FL (ref 6–12)
POTASSIUM SERPL-SCNC: 4.2 MMOL/L (ref 3.5–5.2)
PROT SERPL-MCNC: 6.7 G/DL (ref 6–8.5)
PROT UR QL STRIP: NEGATIVE
RBC # BLD AUTO: 4.96 10*6/MM3 (ref 4.14–5.8)
SODIUM SERPL-SCNC: 136 MMOL/L (ref 136–145)
SP GR UR STRIP: 1.01 (ref 1–1.03)
UROBILINOGEN UR QL STRIP: ABNORMAL
WBC # BLD AUTO: 5.75 10*3/MM3 (ref 3.4–10.8)

## 2021-03-11 PROCEDURE — 25010000002 MORPHINE PER 10 MG: Performed by: EMERGENCY MEDICINE

## 2021-03-11 PROCEDURE — 74176 CT ABD & PELVIS W/O CONTRAST: CPT

## 2021-03-11 PROCEDURE — 96374 THER/PROPH/DIAG INJ IV PUSH: CPT

## 2021-03-11 PROCEDURE — 96375 TX/PRO/DX INJ NEW DRUG ADDON: CPT

## 2021-03-11 PROCEDURE — 81003 URINALYSIS AUTO W/O SCOPE: CPT | Performed by: EMERGENCY MEDICINE

## 2021-03-11 PROCEDURE — 85025 COMPLETE CBC W/AUTO DIFF WBC: CPT | Performed by: EMERGENCY MEDICINE

## 2021-03-11 PROCEDURE — 83690 ASSAY OF LIPASE: CPT | Performed by: EMERGENCY MEDICINE

## 2021-03-11 PROCEDURE — 25010000002 ONDANSETRON PER 1 MG: Performed by: EMERGENCY MEDICINE

## 2021-03-11 PROCEDURE — 99283 EMERGENCY DEPT VISIT LOW MDM: CPT

## 2021-03-11 PROCEDURE — 80053 COMPREHEN METABOLIC PANEL: CPT | Performed by: EMERGENCY MEDICINE

## 2021-03-11 RX ORDER — HYDROCODONE BITARTRATE AND ACETAMINOPHEN 5; 325 MG/1; MG/1
1 TABLET ORAL EVERY 6 HOURS PRN
Qty: 4 TABLET | Refills: 0 | Status: SHIPPED | OUTPATIENT
Start: 2021-03-11 | End: 2021-04-09

## 2021-03-11 RX ORDER — ONDANSETRON 2 MG/ML
4 INJECTION INTRAMUSCULAR; INTRAVENOUS ONCE
Status: COMPLETED | OUTPATIENT
Start: 2021-03-11 | End: 2021-03-11

## 2021-03-11 RX ORDER — MORPHINE SULFATE 2 MG/ML
4 INJECTION, SOLUTION INTRAMUSCULAR; INTRAVENOUS ONCE
Status: COMPLETED | OUTPATIENT
Start: 2021-03-11 | End: 2021-03-11

## 2021-03-11 RX ORDER — SODIUM CHLORIDE 0.9 % (FLUSH) 0.9 %
10 SYRINGE (ML) INJECTION AS NEEDED
Status: DISCONTINUED | OUTPATIENT
Start: 2021-03-11 | End: 2021-03-11 | Stop reason: HOSPADM

## 2021-03-11 RX ADMIN — MORPHINE SULFATE 4 MG: 2 INJECTION, SOLUTION INTRAMUSCULAR; INTRAVENOUS at 18:29

## 2021-03-11 RX ADMIN — ONDANSETRON 4 MG: 2 INJECTION INTRAMUSCULAR; INTRAVENOUS at 18:28

## 2021-03-11 NOTE — ED NOTES
Patient to er from home with c/o right flank pain/ back pain. Patient reported the pain started 3-4 days ago. Patient reported today is worse. Reported on nausea/ vomiting with this. Patient has mask on in triage along with staff.      Steve Ty RN  03/11/21 2454

## 2021-03-11 NOTE — ED NOTES
Pt reports abdominal pain for a few days that is progressively worse today. Denies any nausea, or diarrhea. This rn wearing mask and goggles. Pt wearing mask during encounter.        Sarah Escobedo, GENEVA  03/11/21 2146

## 2021-03-11 NOTE — ED PROVIDER NOTES
EMERGENCY DEPARTMENT ENCOUNTER    Room Number:  09/09  PCP: George Escobar MD  Historian: Patient  History Limited By: Nothing      HPI  Chief Complaint: Abdominal pain and flank pain  Context: Nolan Mccoy Jr. is a 80 y.o. male who presents to the ED c/o abdominal pain and flank pain.  Patient states he has had flank pain for several months.  States it is mild.  Over the last 1 to 2 days has had increasing pain that radiates to his right lower quadrant.  Had no vomiting or diarrhea.  Has had no hematuria or dysuria.  Has had no chest pain or shortness of breath.  Patient has had no prior abdominal surgeries.  Patient has had back surgery.  Has had no rash.      Location: Right flank  Radiation: Right lower quadrant  Character: Aching  Duration: 1 to 2 days  Severity: Moderate  Progression: Worsening  Aggravating Factors: Nothing  Alleviating Factors: Nothing        MEDICAL RECORD REVIEW    Patient has had problems with lumbar radiculopathy in the past          PAST MEDICAL HISTORY  Active Ambulatory Problems     Diagnosis Date Noted   • BPH (benign prostatic hyperplasia) 02/16/2017   • Hypertension 10/09/2017   • Neuropathy 04/10/2017   • Screen for colon cancer 11/06/2017   • Right bundle branch block (RBBB) 12/26/2017   • GERD (gastroesophageal reflux disease) 12/26/2017   • Lumbar spinal stenosis 12/26/2017   • Spinal stenosis of lumbar region with neurogenic claudication 08/02/2019   • Postoperative hypotension 08/13/2019   • Postoperative anemia due to acute blood loss 08/14/2019   • Thrombocytopenia due to blood loss 08/14/2019   • Hospital-acquired pneumonia 09/04/2019   • Fever in adult 09/04/2019   • TIA (transient ischemic attack) 07/22/2020   • Speech disturbance 07/22/2020   • Thrombocytopenia (CMS/HCC) 07/22/2020   • Abnormal chest x-ray 07/22/2020   • CVA (cerebral vascular accident) (CMS/HCC) 07/23/2020   • Atrial tachycardia (CMS/HCC) 12/16/2020     Resolved Ambulatory Problems      Diagnosis Date Noted   • No Resolved Ambulatory Problems     Past Medical History:   Diagnosis Date   • Arthritis    • Cataracts, bilateral    • Fractures    • History of transfusion    • HL (hearing loss)    • Injury of back    • Numbness    • Peripheral neuropathy    • Prostatitis    • Right bundle branch block    • Spinal stenosis of lumbar region    • Stroke (CMS/HCC)          PAST SURGICAL HISTORY  Past Surgical History:   Procedure Laterality Date   • BACK SURGERY     • COLONOSCOPY     • FRACTURE SURGERY      LEFT LEG COMPOUND FRACTURE AGE 10   • LUMBAR DISCECTOMY FUSION INSTRUMENTATION N/A 8/12/2019    Procedure: L2-S1 Laminectomy and Fusion with Instrumentation with Dr. Luong and Dr. Little;  Surgeon: Alex Little MD;  Location: [facility];  Service: Neurosurgery   • LUMBAR LAMINECTOMY DISCECTOMY DECOMPRESSION N/A 8/12/2019    Procedure: L2-S1 laminectomy with a fusion by orthopedics;  Surgeon: Adin Luong MD;  Location: [facility];  Service: Neurosurgery   • TONSILLECTOMY      age 7         FAMILY HISTORY  Family History   Problem Relation Age of Onset   • Hypertension Father    • Neuropathy Father    • Diabetes Sister    • Alzheimer's disease Mother    • Cancer Son    • Malig Hyperthermia Neg Hx          SOCIAL HISTORY  Social History     Socioeconomic History   • Marital status:      Spouse name: Not on file   • Number of children: Not on file   • Years of education: Not on file   • Highest education level: Not on file   Tobacco Use   • Smoking status: Never Smoker   • Smokeless tobacco: Never Used   Substance and Sexual Activity   • Alcohol use: Yes     Alcohol/week: 4.0 standard drinks     Types: 2 Glasses of wine, 2 Cans of beer per week     Comment: 1 drink every other day occasionally   • Drug use: Never     Comment: CBD Oil   • Sexual activity: Defer         ALLERGIES  Sulfa antibiotics, Penicillins, and Sulfa antibiotics        REVIEW OF SYSTEMS  Review of Systems    Constitutional: Negative for activity change, appetite change and fever.   HENT: Negative for congestion and sore throat.    Eyes: Negative.    Respiratory: Negative for cough and shortness of breath.    Cardiovascular: Negative for chest pain and leg swelling.   Gastrointestinal: Positive for abdominal pain. Negative for diarrhea and vomiting.   Endocrine: Negative.    Genitourinary: Positive for flank pain. Negative for decreased urine volume and dysuria.   Musculoskeletal: Negative for neck pain.   Skin: Negative for rash and wound.   Allergic/Immunologic: Negative.    Neurological: Negative for weakness, numbness and headaches.   Hematological: Negative.    Psychiatric/Behavioral: Negative.    All other systems reviewed and are negative.           PHYSICAL EXAM  ED Triage Vitals [03/11/21 1803]   Temp Heart Rate Resp BP SpO2   96.7 °F (35.9 °C) 100 -- -- 91 %      Temp src Heart Rate Source Patient Position BP Location FiO2 (%)   Tympanic -- -- -- --       Physical Exam  Vitals and nursing note reviewed.   Constitutional:       General: He is not in acute distress.  HENT:      Head: Normocephalic and atraumatic.   Eyes:      Pupils: Pupils are equal, round, and reactive to light.   Cardiovascular:      Rate and Rhythm: Normal rate and regular rhythm.      Heart sounds: Normal heart sounds.   Pulmonary:      Effort: Pulmonary effort is normal. No respiratory distress.      Breath sounds: Normal breath sounds.   Abdominal:      Palpations: Abdomen is soft.      Tenderness: There is abdominal tenderness in the right lower quadrant. There is no guarding or rebound.   Musculoskeletal:         General: Normal range of motion.      Cervical back: Normal range of motion and neck supple.   Skin:     General: Skin is warm and dry.   Neurological:      Mental Status: He is alert and oriented to person, place, and time.      Sensory: Sensation is intact.   Psychiatric:         Mood and Affect: Mood and affect normal.        Patient was wearing a face mask when I entered the room and they continued to wear a mask throughout their stay in the ED.  I wore PPE, including  gloves, face mask with shield or face mask with goggles whenever I was in the room with patient.       LAB RESULTS  Recent Results (from the past 24 hour(s))   Comprehensive Metabolic Panel    Collection Time: 03/11/21  6:18 PM    Specimen: Blood   Result Value Ref Range    Glucose 94 65 - 99 mg/dL    BUN 13 8 - 23 mg/dL    Creatinine 1.10 0.76 - 1.27 mg/dL    Sodium 136 136 - 145 mmol/L    Potassium 4.2 3.5 - 5.2 mmol/L    Chloride 101 98 - 107 mmol/L    CO2 26.5 22.0 - 29.0 mmol/L    Calcium 9.2 8.6 - 10.5 mg/dL    Total Protein 6.7 6.0 - 8.5 g/dL    Albumin 3.90 3.50 - 5.20 g/dL    ALT (SGPT) 21 1 - 41 U/L    AST (SGOT) 25 1 - 40 U/L    Alkaline Phosphatase 68 39 - 117 U/L    Total Bilirubin 0.5 0.0 - 1.2 mg/dL    eGFR Non African Amer 65 >60 mL/min/1.73    Globulin 2.8 gm/dL    A/G Ratio 1.4 g/dL    BUN/Creatinine Ratio 11.8 7.0 - 25.0    Anion Gap 8.5 5.0 - 15.0 mmol/L   Lipase    Collection Time: 03/11/21  6:18 PM    Specimen: Blood   Result Value Ref Range    Lipase 25 13 - 60 U/L   CBC Auto Differential    Collection Time: 03/11/21  6:18 PM    Specimen: Blood   Result Value Ref Range    WBC 5.75 3.40 - 10.80 10*3/mm3    RBC 4.96 4.14 - 5.80 10*6/mm3    Hemoglobin 16.2 13.0 - 17.7 g/dL    Hematocrit 45.4 37.5 - 51.0 %    MCV 91.5 79.0 - 97.0 fL    MCH 32.7 26.6 - 33.0 pg    MCHC 35.7 31.5 - 35.7 g/dL    RDW 14.2 12.3 - 15.4 %    RDW-SD 47.0 37.0 - 54.0 fl    MPV 10.9 6.0 - 12.0 fL    Platelets 133 (L) 140 - 450 10*3/mm3    Neutrophil % 76.8 (H) 42.7 - 76.0 %    Lymphocyte % 12.0 (L) 19.6 - 45.3 %    Monocyte % 9.4 5.0 - 12.0 %    Eosinophil % 1.2 0.3 - 6.2 %    Basophil % 0.3 0.0 - 1.5 %    Neutrophils, Absolute 4.41 1.70 - 7.00 10*3/mm3    Lymphocytes, Absolute 0.69 (L) 0.70 - 3.10 10*3/mm3    Monocytes, Absolute 0.54 0.10 - 0.90 10*3/mm3    Eosinophils,  Absolute 0.07 0.00 - 0.40 10*3/mm3    Basophils, Absolute 0.02 0.00 - 0.20 10*3/mm3   Urinalysis With Microscopic If Indicated (No Culture) - Urine, Clean Catch    Collection Time: 03/11/21  6:32 PM    Specimen: Urine, Clean Catch   Result Value Ref Range    Color, UA Dark Yellow (A) Yellow, Straw    Appearance, UA Clear Clear    pH, UA 7.5 5.0 - 8.0    Specific Gravity, UA 1.010 1.005 - 1.030    Glucose, UA Negative Negative    Ketones, UA Negative Negative    Bilirubin, UA Negative Negative    Blood, UA Negative Negative    Protein, UA Negative Negative    Leuk Esterase, UA Negative Negative    Nitrite, UA Negative Negative    Urobilinogen, UA 0.2 E.U./dL 0.2 - 1.0 E.U./dL       Ordered the above labs and reviewed the results.        RADIOLOGY  CT Abdomen Pelvis Without Contrast   Final Result           1. Colonic diverticulosis. No acute inflammatory process of bowel is   identified. Follow-up as indications persist.   2. No urolithiasis or hydronephrosis. Enlarged prostate.   3. T11 compression deformity, age indeterminate.       Discussed by telephone with Dr. Montelongo at 1924, 03/11/2021.       This report was finalized on 3/11/2021 7:27 PM by Dr. Rosendo Carranza M.D.               Ordered the above noted radiological studies. Reviewed by me in PACS.  Discussed with Dr. Carranza (radiologist) regarding CT/MRI scan results.          PROCEDURES  Procedures          MEDICATIONS GIVEN IN ER  Medications   sodium chloride 0.9 % flush 10 mL (has no administration in time range)   morphine injection 4 mg (4 mg Intravenous Given 3/11/21 1829)   ondansetron (ZOFRAN) injection 4 mg (4 mg Intravenous Given 3/11/21 1828)             PROGRESS AND CONSULTS  ED Course as of Mar 11 2132   Thu Mar 11, 2021   2038 20:39 EST  Patient present for abdominal pain and flank pain.  Patient has no evidence of kidney stone or kidney infection.  No evidence of appendicitis.  Patient does have T11 compression fracture of unclear  age.  Patient does not remember falling.  Patient will be given small amount of pain medication and is given follow-up with Dr. Luong who is his spine surgeon.  Instructed to return here for worsening pain.  No evidence of shingles    [SL]      ED Course User Index  [SL] Adin Montelongo MD           MEDICAL DECISION MAKING      MDM  Number of Diagnoses or Management Options     Amount and/or Complexity of Data Reviewed  Clinical lab tests: reviewed and ordered (Normal urinalysis)  Tests in the radiology section of CPT®: reviewed and ordered (T11 compression fracture)               DIAGNOSIS  Final diagnoses:   Flank pain, acute   Closed wedge compression fracture of T11 vertebra, initial encounter (CMS/[facility])           DISPOSITION  DISCHARGE    Patient discharged in stable condition.    Reviewed implications of results, diagnosis, meds, responsibility to follow up, warning signs and symptoms of possible worsening, potential complications and reasons to return to ER, including worsening pain.    Patient/Family voiced understanding of above instructions.    Discussed plan for discharge, as there is no emergent indication for admission. Patient referred to primary care provider for BP management due to today's BP. Pt/family is agreeable and understands need for follow up and repeat testing.  Pt is aware that discharge does not mean that nothing is wrong but it indicates no emergency is present that requires admission and they must continue care with follow-up as given below or physician of their choice.     FOLLOW-UP  Adin Luong MD  [address] [address] [phone]    Schedule an appointment as soon as possible for a visit            Medication List      New Prescriptions    HYDROcodone-acetaminophen 5-325 MG per tablet  Commonly known as: NORCO  Take 1 tablet by mouth Every 6 (Six) Hours As Needed for Moderate Pain .           Where to Get Your Medications      These medications were  sent to [address] - [address] [facility] - [phone]  - [phone]   [address] [facility] [address]    Phone: [phone]   · HYDROcodone-acetaminophen 5-325 MG per tablet             Latest Documented Vital Signs:  As of 21:32 EST  BP- 108/61 HR- 68 Temp- 96.7 °F (35.9 °C) (Tympanic) O2 sat- 96%                         Adin Montelongo MD  03/11/21 [id number]

## 2021-03-12 ENCOUNTER — TELEPHONE (OUTPATIENT)
Dept: NEUROSURGERY | Facility: CLINIC | Age: 81
End: 2021-03-12

## 2021-03-12 NOTE — TELEPHONE ENCOUNTER
Patient was seen in the ER and he wife called to get him a follow up with . Dr. Luong please review ED notes and let me know if he needs to be seen in the office or if you want to do a televisit.

## 2021-03-15 ENCOUNTER — OFFICE VISIT (OUTPATIENT)
Dept: NEUROSURGERY | Facility: CLINIC | Age: 81
End: 2021-03-15

## 2021-03-15 DIAGNOSIS — S22.000A THORACIC COMPRESSION FRACTURE, CLOSED, INITIAL ENCOUNTER (HCC): Primary | ICD-10-CM

## 2021-03-15 PROCEDURE — 99441 PR PHYS/QHP TELEPHONE EVALUATION 5-10 MIN: CPT | Performed by: NEUROLOGICAL SURGERY

## 2021-03-15 NOTE — TELEPHONE ENCOUNTER
LM for Mrs. Mccoy regarding televisit for Mr. Mccoy with Dr. Luong for today. Advised that if they need to change it to call the office

## 2021-03-15 NOTE — PROGRESS NOTES
Subjective   Patient ID: Nolan Mccoy Jr. is a 80 y.o. male is here today via televisit for follow-up to T11 Compression fracture, and abdominal pain with a new CT of abdomen on 03.11.2021.     You have chosen to receive care through a telephone visit. Do you consent to use a telephone visit for your medical care today? Yes    We had a telephone visit today.  The patient was at home and I was in the office.  We talked for 5 minutes.  His wife was also on the phone.    History of Present Illness    The patient has been having fairly severe trouble with his abdomen.  He had a lot of abdominal pain and went to the emergency room last week.  Subsequent to that a CT of his abdomen was done which showed a T11 compression fracture of unknown age.  He said he was also constipated and has had 2 bowel movements since he was in the emergency room and is feeling substantially better.  He has no back pain at all.    The following portions of the patient's history were reviewed and updated as appropriate: allergies, current medications, past family history, past medical history, past social history, past surgical history and problem list.    Review of Systems    I have reviewed the review of systems as documented by my MA.      Objective         Physical Exam  Neurological:      Mental Status: He is oriented to person, place, and time.       Neurologic Exam     Mental Status   Oriented to person, place, and time.           Assessment/Plan   Independent Review of Radiographic Studies:      I personally reviewed the images from the following studies.    I reviewed his CT of the abdomen pelvis.  I explained the patient that I have no expertise with regard to the abdominal part of the scan but I did look at the spine.  This does indeed show a mild fracture of T11.  I also looked at the CT of the chest done in July of last year and that does not show the fracture.    Medical Decision Making:      I told the patient and his wife I  thought the fracture was probably fairly new but with him feeling better we would not do anything about it anyway.  Consequently I do not think we need to do any further work-up at this point.  I told him that as far as his abdominal pain is concerned he really must talk to his family doctor as I have no expertise there whatsoever.  He continued to ask me about it but I believe by the end of the conversation I made it clear that he needs to follow-up with his abdominal pain with his family doctor.  He will call if anything happens in the future.  Diagnoses and all orders for this visit:    1. Thoracic compression fracture, closed, initial encounter (CMS/[facility]) (Primary)      Return if symptoms worsen or fail to improve.

## 2021-04-09 ENCOUNTER — OFFICE VISIT (OUTPATIENT)
Dept: NEUROLOGY | Facility: CLINIC | Age: 81
End: 2021-04-09

## 2021-04-09 ENCOUNTER — LAB (OUTPATIENT)
Dept: LAB | Facility: HOSPITAL | Age: 81
End: 2021-04-09

## 2021-04-09 VITALS
SYSTOLIC BLOOD PRESSURE: 120 MMHG | HEART RATE: 73 BPM | BODY MASS INDEX: 28.98 KG/M2 | DIASTOLIC BLOOD PRESSURE: 80 MMHG | HEIGHT: 71 IN | WEIGHT: 207 LBS | OXYGEN SATURATION: 97 %

## 2021-04-09 DIAGNOSIS — I63.81 LEFT THALAMIC INFARCTION (HCC): ICD-10-CM

## 2021-04-09 DIAGNOSIS — G62.9 NEUROPATHY: ICD-10-CM

## 2021-04-09 DIAGNOSIS — G31.84 MCI (MILD COGNITIVE IMPAIRMENT): ICD-10-CM

## 2021-04-09 DIAGNOSIS — G31.84 MCI (MILD COGNITIVE IMPAIRMENT): Primary | ICD-10-CM

## 2021-04-09 DIAGNOSIS — I63.9 CEREBROVASCULAR ACCIDENT (CVA), UNSPECIFIED MECHANISM (HCC): ICD-10-CM

## 2021-04-09 PROBLEM — M54.40 CHRONIC LOW BACK PAIN WITH SCIATICA: Status: ACTIVE | Noted: 2019-10-01

## 2021-04-09 PROBLEM — G89.29 CHRONIC LOW BACK PAIN WITH SCIATICA: Status: ACTIVE | Noted: 2019-10-01

## 2021-04-09 PROBLEM — E78.49 OTHER HYPERLIPIDEMIA: Status: ACTIVE | Noted: 2020-04-08

## 2021-04-09 PROBLEM — M54.16 LUMBAR RADICULOPATHY, CHRONIC: Status: ACTIVE | Noted: 2020-04-08

## 2021-04-09 PROBLEM — R73.03 PREDIABETES: Status: ACTIVE | Noted: 2020-01-09

## 2021-04-09 PROBLEM — Z98.1 S/P LUMBAR FUSION: Status: ACTIVE | Noted: 2020-04-08

## 2021-04-09 PROBLEM — R73.01 IFG (IMPAIRED FASTING GLUCOSE): Status: ACTIVE | Noted: 2020-04-08

## 2021-04-09 PROBLEM — D69.6 THROMBOCYTOPENIA (HCC): Status: RESOLVED | Noted: 2020-07-22 | Resolved: 2021-04-09

## 2021-04-09 PROBLEM — E55.9 VITAMIN D DEFICIENCY: Status: ACTIVE | Noted: 2020-01-09

## 2021-04-09 PROBLEM — I83.811 VARICOSE VEINS OF LEG WITH PAIN, RIGHT: Status: ACTIVE | Noted: 2018-11-08

## 2021-04-09 LAB
ALBUMIN SERPL-MCNC: 3.9 G/DL (ref 3.5–5.2)
ALBUMIN/GLOB SERPL: 1.4 G/DL
ALP SERPL-CCNC: 67 U/L (ref 39–117)
ALT SERPL W P-5'-P-CCNC: 20 U/L (ref 1–41)
ANION GAP SERPL CALCULATED.3IONS-SCNC: 7.7 MMOL/L (ref 5–15)
AST SERPL-CCNC: 23 U/L (ref 1–40)
BILIRUB SERPL-MCNC: 0.4 MG/DL (ref 0–1.2)
BUN SERPL-MCNC: 15 MG/DL (ref 8–23)
BUN/CREAT SERPL: 14.9 (ref 7–25)
CALCIUM SPEC-SCNC: 8.9 MG/DL (ref 8.6–10.5)
CHLORIDE SERPL-SCNC: 104 MMOL/L (ref 98–107)
CHOLEST SERPL-MCNC: 136 MG/DL (ref 0–200)
CO2 SERPL-SCNC: 26.3 MMOL/L (ref 22–29)
CREAT SERPL-MCNC: 1.01 MG/DL (ref 0.76–1.27)
GFR SERPL CREATININE-BSD FRML MDRD: 71 ML/MIN/1.73
GLOBULIN UR ELPH-MCNC: 2.8 GM/DL
GLUCOSE SERPL-MCNC: 112 MG/DL (ref 65–99)
HDLC SERPL-MCNC: 40 MG/DL (ref 40–60)
LDLC SERPL CALC-MCNC: 78 MG/DL (ref 0–100)
LDLC/HDLC SERPL: 1.92 {RATIO}
POTASSIUM SERPL-SCNC: 4.5 MMOL/L (ref 3.5–5.2)
PROT SERPL-MCNC: 6.7 G/DL (ref 6–8.5)
SODIUM SERPL-SCNC: 138 MMOL/L (ref 136–145)
TRIGL SERPL-MCNC: 97 MG/DL (ref 0–150)
VIT B12 BLD-MCNC: 488 PG/ML (ref 211–946)
VLDLC SERPL-MCNC: 18 MG/DL (ref 5–40)

## 2021-04-09 PROCEDURE — 80061 LIPID PANEL: CPT

## 2021-04-09 PROCEDURE — 99214 OFFICE O/P EST MOD 30 MIN: CPT | Performed by: NURSE PRACTITIONER

## 2021-04-09 PROCEDURE — 36415 COLL VENOUS BLD VENIPUNCTURE: CPT

## 2021-04-09 PROCEDURE — 80053 COMPREHEN METABOLIC PANEL: CPT

## 2021-04-09 PROCEDURE — 82607 VITAMIN B-12: CPT

## 2021-04-09 RX ORDER — DONEPEZIL HYDROCHLORIDE 10 MG/1
TABLET, FILM COATED ORAL
Qty: 30 TABLET | Refills: 2 | Status: SHIPPED | OUTPATIENT
Start: 2021-04-09 | End: 2021-11-18

## 2021-04-09 NOTE — PATIENT INSTRUCTIONS
Check B12 level today  Start aricept for memory. Side effects include upset stomach, diarrhea, worsening confusion, and dizziness.  Take at night, start with 1/2 pill for first month, if tolerated increase to whole pill.   If you haven't heard from Freddy in 2 weeks let me know  Donepezil Oral Dissolving Tablet  What is this medicine?  DONEPEZIL (li NEP e zil) is used to treat mild to moderate dementia caused by Alzheimer's disease.  This medicine may be used for other purposes; ask your health care provider or pharmacist if you have questions.  COMMON BRAND NAME(S): Aricept  What should I tell my health care provider before I take this medicine?  They need to know if you have any of these conditions:  · asthma or other lung disease  · difficulty passing urine  · head injury  · heart disease  · history of irregular heartbeat  · liver disease  · seizures (convulsions)  · stomach or intestinal disease, ulcers or stomach bleeding  · an unusual or allergic reaction to donepezil, other medicines, foods, dyes, or preservatives  · pregnant or trying to get pregnant  · breast-feeding  How should I use this medicine?  Take this medicine by mouth. Follow the directions on the prescription label. Place the tablet in the mouth and allow it to dissolve, then swallow. While you may take these tablets with water, it is not necessary to do so. You may take this medicine with or without food. Take your doses at regular intervals. This medicine is usually taken before bedtime. Do not take your medicine more often than directed. Continue to take your medicine even if you feel better. Do not stop taking except on the advice of your doctor or health care professional.  Talk to your pediatrician regarding the use of this medicine in children. Special care may be needed.  Overdosage: If you think you have taken too much of this medicine contact a poison control center or emergency room at once.  NOTE: This medicine is only for you. Do  not share this medicine with others.  What if I miss a dose?  If you miss a dose, take it as soon as you can. If it is almost time for your next dose, take only that dose. Do not take double or extra doses.  What may interact with this medicine?  Do not take this medicine with any of the following medications:  · certain medicines for fungal infections like itraconazole, fluconazole, posaconazole, and voriconazole  · cisapride  · dextromethorphan; quinidine  · dronedarone  · pimozide  · quinidine  · thioridazine  This medicine may also interact with the following medications:  · antihistamines for allergy, cough and cold  · atropine  · bethanechol  · carbamazepine  · certain medicines for bladder problems like oxybutynin, tolterodine  · certain medicines for Parkinson's disease like benztropine, trihexyphenidyl  · certain medicines for stomach problems like dicyclomine, hyoscyamine  · certain medicines for travel sickness like scopolamine  · dexamethasone  · dofetilide  · ipratropium  · NSAIDs, medicines for pain and inflammation, like ibuprofen or naproxen  · other medicines for Alzheimer's disease  · other medicines that prolong the QT interval (cause an abnormal heart rhythm)  · phenobarbital  · phenytoin  · rifampin, rifabutin or rifapentine  · ziprasidone  This list may not describe all possible interactions. Give your health care provider a list of all the medicines, herbs, non-prescription drugs, or dietary supplements you use. Also tell them if you smoke, drink alcohol, or use illegal drugs. Some items may interact with your medicine.  What should I watch for while using this medicine?  Visit your doctor or health care professional for regular checks on your progress. Check with your doctor or health care professional if your symptoms do not get better or if they get worse.  You may get drowsy or dizzy. Do not drive, use machinery, or do anything that needs mental alertness until you know how this drug  affects you.  What side effects may I notice from receiving this medicine?  Side effects that you should report to your doctor or health care professional as soon as possible:  · allergic reactions like skin rash, itching or hives, swelling of the face, lips, or tongue  · feeling faint or lightheaded, falls  · loss of bladder control  · seizures  · signs and symptoms of a dangerous change in heartbeat or heart rhythm like chest pain; dizziness; fast or irregular heartbeat; palpitations; feeling faint or lightheaded, falls; breathing problems  · signs and symptoms of infection like fever or chills; cough; sore throat; pain or trouble passing urine  · signs and symptoms of liver injury like dark yellow or brown urine; general ill feeling or flu-like symptoms; light-colored stools; loss of appetite; nausea; right upper belly pain; unusually weak or tired; yellowing of the eyes or skin  · slow heartbeat or palpitations  · unusual bleeding or bruising  · vomiting  Side effects that usually do not require medical attention (report to your doctor or health care professional if they continue or are bothersome):  · diarrhea, especially when starting treatment  · headache  · loss of appetite  · muscle cramps  · nausea  · stomach upset  This list may not describe all possible side effects. Call your doctor for medical advice about side effects. You may report side effects to FDA at 4-836-ZUZ-8507.  Where should I keep my medicine?  Keep out of reach of children.  Store at room temperature between 15 and 30 degrees C (59 and 86 degrees F). Throw away any unused medicine after the expiration date.  NOTE: This sheet is a summary. It may not cover all possible information. If you have questions about this medicine, talk to your doctor, pharmacist, or health care provider.  © 2021 [employer]/Gold Standard (2019-12-09 10:24:00)

## 2021-04-12 ENCOUNTER — TELEPHONE (OUTPATIENT)
Dept: NEUROLOGY | Facility: CLINIC | Age: 81
End: 2021-04-12

## 2021-04-12 NOTE — TELEPHONE ENCOUNTER
----- Message from CARMEN Yan sent at 4/12/2021  3:20 PM EDT -----  Please let patient know his B12 level is good and cholesterol is better. No changes to medications.

## 2021-04-19 RX ORDER — ROSUVASTATIN CALCIUM 5 MG/1
TABLET, COATED ORAL
Qty: 30 TABLET | Refills: 1 | OUTPATIENT
Start: 2021-04-19

## 2021-08-02 ENCOUNTER — TELEPHONE (OUTPATIENT)
Dept: NEUROLOGY | Facility: CLINIC | Age: 81
End: 2021-08-02

## 2021-08-02 NOTE — TELEPHONE ENCOUNTER
----- Message from Hannah Hutchinson sent at 7/28/2021 10:59 AM EDT -----  Contact: [phone]  Mr Mccoy is having a colonoscopy September 20  at [place].  He needs clearance for this procedure.  You can just put in Epic and they will see the letter at [place]

## 2021-08-03 NOTE — TELEPHONE ENCOUNTER
Letter drafted and scanned into media.   Copied below:    August 3, 2021    To Whom It May Concern:    Recommendations:  1. Patient should be neurologically event-free for any elective procedures for 3 months prior   2. Hold antiplatelets/anticoagulation for the least amount of time possible.  3. Continue aspirin if possible  4. Avoid significant drops in blood pressure- slight hypertension preferred   5. Avoid anemia to a hematocrit of less than 30  6. Use spinal anesthesia if possible   7. Minimize postoperative sedation and duration of anesthesia  8. Perform frequent neuro checks in the immediate perioperative period  9. If neurological changes are detected then call the stroke team immediately- i.e.Initiate Team D   10. Maintain excellent hydration   11. Resume antiplatelets/anticoagulation as soon as possible postoperatively       Thank you,  [facility]

## 2021-08-16 ENCOUNTER — TELEPHONE (OUTPATIENT)
Dept: NEUROLOGY | Facility: CLINIC | Age: 81
End: 2021-08-16

## 2021-08-16 NOTE — TELEPHONE ENCOUNTER
Provider:  BENJAMIN JOHNSON   Caller:  LANDON   Relationship to Patient: SPOUSE     Phone Number: [phone]  Reason for Call:  PTS WIFE CALLING IN TODAY STATES PT WAS DX WITH SHINGLES ON Saturday  AT [facility]  PT WAS PUT ON VALTREX AT THAT TIME . WIFE IS CONCERNED THAT PT IS HAVING HEADACHE ON THE TOP OF HIS HEAD WHERE PT HAD PRIOR STROKE . WIFE WOULD LIKE TO VERIFY THAT THIS MEDICATION IS SAFE TO CONTINUE DUE TO PRIOR STROKE AND CURRENT HEAD PAIN   PLEASE ADVISE   When was the patient last seen: 04/09/2021  When did it start:  HEAD PAIN STARTED AFTER STARTING  VALTREX   Where is it located:  TOP OF HIS HEAD    Characteristics of symptom/severity: PAIN IS  TOLERABLE    Timing- Is it constant or intermittent:INTERMITTANT  ONLY HAS PAIN AFTER TAKING THIS MEDICATION    WIFE DOES STATE THAT PAIN GOES AWAY AFTER AWHILE BUT  IS CONSISTENT AFTER EACH DOSE OF MEDICATION      PLEASE ADVISE

## 2021-08-17 NOTE — TELEPHONE ENCOUNTER
Informed wife it is safe to continue valtrex. Informed of h/a s/e and that it would not be harmful. Wife reports shingles on pt's back side and that the valtrex is really working.

## 2021-08-17 NOTE — TELEPHONE ENCOUNTER
Provider: BENJAMIN MORALES  Caller: SANDRA  Relationship to Patient: WIFE  Phone Number: [phone]    Reason for Call: PATIENTS WIFE WAS CALLING TO CHECK ON THE STATUS OF THIS MESSAGE, I ADVISED MESSAGE WAS SENT TO PROVIDER.

## 2021-10-11 ENCOUNTER — TELEPHONE (OUTPATIENT)
Dept: NEUROLOGY | Facility: OTHER | Age: 81
End: 2021-10-11

## 2021-10-11 NOTE — TELEPHONE ENCOUNTER
PT'S WIFE CALLING TO SEE IF THERE WAS A SOONER APPT AVAILABLE.  NOTHING AVAILABLE AT THIS TIME.  CONFIRMED THAT PT IS ON WAIT LIST.

## 2021-11-05 NOTE — PROGRESS NOTES
DOS: 2021  NAME: Nolan Mccoy Jr.   : 1940  PCP: George Escobar MD       CC: Follow-up for MCI, stroke, and peripheral neuropathy    Neurological Problem and Interval History:  80 y.o. rt handed male with past medical history of HTN, HLD, prediabetes, BPH, and chronic low back who is being seen in follow-up today for mild cognitive impairment, history of stroke, and idiopathic peripheral neuropathy.  He is accompanied by his wife.    The following history was reviewed and updated as appropriate:  · The patient presented to [facility] on  with facial droop and right-sided weakness as well as difficulty communicating which occurred while he was on a Zoom video meeting.  NIH was initially 8 per EMS.  When Dr. Alford saw him NIH was 0 so TPA was deferred.  CTA head/neck showed right vertebral artery that appeared stenotic but it was not well seen.  There was also a plaque moderately narrowing left vertebral artery at the origin and the CTA was otherwise unremarkable. MRI of the brain with and without contrast showed a small acute infarct in the left internal capsule.  There was also mild small vessel disease.2D echo showed EF of 61 to 65%, normal left atrial size, and saline test results were negative.  His LDL was 95 and his hemoglobin A1c was 5.0%.  He did have a platelet count of 118.  He was started on aspirin 81 mg daily and Plavix 75 mg daily.  He was recommended to continue DAPT x90 days then stop Plavix and continue aspirin indefinitely.  He was also started on Lipitor 80 mg. 48-hour Holter showed several atrial runs and a single 22 beat run of wide-complex tachycardia.  He was evaluated by Dr. Frost for this in 2020 and it was noted that there was no atrial fibrillation and patient was not recommended to follow-up. The patient developed cognitive symptoms on Lipitor so this was stopped. He has been started on Zetia by their PCP.   · Following his stroke  his wife called reporting the patient was having issues with memory loss.  He notes issues remembering names of friends he sees regularly as well as other nouns.  In September 2020 MoCA score was 19 out of 30 and it was 20 out of 30 in April 2021.  He underwent neuropsych testing with [facility] in October 2021 received a diagnosis of mild cognitive impairment.  · He has had symptoms of neuropathy for at least 10 years.  He has numbness in his feet and burning in his lower legs which is fairly constant but worse at night.  He is followed by pain management and is on gabapentin 600 mg 3 times daily.  He had an L2-S1 laminectomy and fusion by Dr. Luong and Dr. Little in 2019 in hopes that it would improve his leg symptoms however it did not.  He has not previously had an EMG.  His B12 was borderline at 392 in 2019.  He has since been taking B complex vitamins.When I saw him in September 2020 I checked additional labs for treatable causes of peripheral neuropathy: TSH was 3.1, free T4 was 1.01, heavy metal screen was normal, sedimentation rate was 3, folate was greater than 20, immunofixation showed no monoclonality/M spike, cryoglobulin was not detected, and RPR was nonreactive.  Lipid panel completed this month showed LDL of 71, total cholesterol 132, triglycerides 125, HDL 36.  Repeat B12 level was 488 in April 2021.    Today we reviewed his neurocognitive assessment.  When I last saw him Aricept was started.  He took it for 3 days and then decided not to take it.  He states he does not like taking medication but does not remember any specific side effects.  He is agreeable to trying this again.  Today his MoCA score is actually improved to 24 out of 30.    Today he reports worsening of his peripheral neuropathy.  The pain is worse, particularly during the day and numbness and pain have progressed up to his left thigh and in the right leg symptoms are just below his right knee.  He is also now  experiencing some numbness in his fingertips bilaterally.  He has never had an EMG/nerve conduction that he recalls.  Previously offered genetic testing which was declined. He is taking gabapentin 600 mg 3 times daily.  He feels capsules are more helpful than tablets.  He requests an increased dose today due to worsening symptoms.  He denies any previous side effects related to taking gabapentin.  He has had some associated balance issues but has not had any significant falls and feels his cane is helpful.    He had labs most recently in July 2021: Hemoglobin A1c was 5.7%, LDL was 95, total cholesterol 157, triglycerides 146, and HDL was 33, and CMP was unremarkable aside from glucose of 113.    He continues to take aspirin 81 mg daily and Zetia.  No new signs or symptoms of stroke since I last saw him.    Family history significant for peripheral neuropathy in his father, paternal uncle, and grandfather.  Mother had Alzheimer's.      Review of Systems:        Review of Systems   Musculoskeletal: Negative for gait problem.   Neurological: Positive for numbness (finger tips). Negative for dizziness, tremors, seizures, syncope, facial asymmetry, speech difficulty, weakness, light-headedness and headaches.   Psychiatric/Behavioral: Positive for confusion. Negative for agitation, behavioral problems, decreased concentration, dysphoric mood, hallucinations, self-injury, sleep disturbance and suicidal ideas. The patient is not nervous/anxious and is not hyperactive.          Current Outpatient Medications:   •  acetaminophen (TYLENOL) 325 MG tablet, Take 650 mg by mouth Every 6 (Six) Hours As Needed for Mild Pain ., Disp: , Rfl:   •  acetaminophen (TYLENOL) 325 MG tablet, Take 650 mg by mouth Every 6 (Six) Hours As Needed for Mild Pain ., Disp: , Rfl:   •  aspirin 81 MG EC tablet, Take 81 mg by mouth Daily., Disp: , Rfl:   •  B Complex Vitamins (VITAMIN B COMPLEX PO), Take  by mouth., Disp: , Rfl:   •  Ca  Phosphate-Cholecalciferol (CALCIUM/VITAMIN D3 GUMMIES PO), Take  by mouth., Disp: , Rfl:   •  calcium carbonate (TUMS) 500 MG chewable tablet, Chew 1 tablet Every 4 (Four) Hours As Needed for Indigestion or Heartburn., Disp: , Rfl:   •  donepezil (Aricept) 10 MG tablet, Take 1/2 tablet night for 1 month then whole pill nightly., Disp: 30 tablet, Rfl: 2  •  dutasteride (AVODART) 0.5 MG capsule, Take 0.5 mg by mouth Daily., Disp: , Rfl:   •  ezetimibe (ZETIA) 10 MG tablet, Take 10 mg by mouth Daily., Disp: , Rfl:   •  gabapentin (NEURONTIN) 600 MG tablet, Take 1 tablet by mouth 3 (Three) Times a Day., Disp: 90 tablet, Rfl: 2  •  lidocaine (XYLOCAINE) 5 % ointment, Apply  topically to the appropriate area as directed Every 2 (Two) Hours As Needed for Mild Pain ., Disp: 50 g, Rfl: 0  •  lisinopril (PRINIVIL,ZESTRIL) 5 MG tablet, Take 2.5 mg by mouth Daily., Disp: , Rfl:   •  lisinopril (PRINIVIL,ZESTRIL) 5 MG tablet, Take 5 mg by mouth Daily., Disp: , Rfl:   •  Multiple Vitamins-Minerals (MULTIVITAMIN WITH MINERALS) tablet, Take 1 tablet by mouth Daily., Disp: , Rfl:   •  tamsulosin (FLOMAX) 0.4 MG capsule 24 hr capsule, Take 1 capsule by mouth Daily., Disp: , Rfl:   •  valACYclovir (VALTREX) 1000 MG tablet, Take 1 tablet by mouth 3 (Three) Times a Day., Disp: 21 tablet, Rfl: 0  •  vitamin D (ERGOCALCIFEROL) 1.25 MG (29959 UT) capsule capsule, Take 50,000 Units by mouth 1 (One) Time Per Week., Disp: , Rfl:       Laboratory Results:             Lab Results   Component Value Date    HGBA1C 5.7 (H) 07/06/2021         Lab Results   Component Value Date    CHOL 136 04/09/2021    CHOL 173 07/23/2020         Lab Results   Component Value Date    HDL 40 04/09/2021    HDL 29 (L) 10/01/2020    HDL 30 (L) 08/06/2020         Lab Results   Component Value Date    LDL 78 04/09/2021     (H) 10/01/2020    LDL 44 08/06/2020         Lab Results   Component Value Date    TRIG 97 04/09/2021    TRIG 182 (H) 10/01/2020    TRIG 97  08/06/2020     Lab Results   Component Value Date    RPR Non-Reactive 09/22/2020     Lab Results   Component Value Date    TSH 3.100 09/22/2020     Lab Results   Component Value Date    BRBCKMWM12 488 04/09/2021     Vitals:    11/18/21 0915   BP: 100/74   BP Location: Left arm   Patient Position: Sitting   Cuff Size: Adult   Pulse: 73   SpO2: 98%   Weight: 93 kg (205 lb)       Physical Examination:   General Appearance:   Well developed, well nourished, well groomed, alert, and cooperative.  HEENT: Normocephalic.    Cardiac: Regular rate and rhythm. No murmurs.  Peripheral Vasculature: Radial pulses are equal and symmetric.   Extremities:    No edema or deformities.  Skin:    No rashes or birth marks.    Neurological examination:  Higher Integrative  Function: Awake/alert.  Oriented 5 out of 6 on the Schley.  MoCA score was 24 out of 30, he missed additional points for naming, language, fluency, and delayed recall.  No neglect.  CN II: Pupils are equal, round, and reactive to light. Normal visual fields.    CN III IV VI: Extraocular movements are full without nystagmus.   CN V: Normal facial sensation.  CN VII: Facial movements are symmetric. No weakness.  CN VIII:   Auditory acuity is decreased.  CN IX & X:   Symmetric palatal movement.  CN XI: Sternocleidomastoid and trapezius are normal.  No weakness.  CN XII:   The tongue is midline.  No atrophy or fasciculations.  Motor: Normal muscle strength, bulk and tone in upper and lower extremities.  No fasciculations, rigidity, spasticity, or abnormal movements.  Reflexes: 2+ in the upper extremities and bilateral patella, absent at the ankles.   Sensation: Decreased to light touch.  Intact to pinprick in ankles and fingertips.  Station and Gait: Mildly broad-based, antalgic, uses cane.  Coordination: Finger to nose test shows no dysmetria. Heel to shin normal.    Diagnoses / Discussion:    Mild cognitive impairment  History of stroke  Idiopathic peripheral  neuropathy  Lumbar stenosis status post L2-S1 laminectomy/fusion  Statin intolerance    Plan: Continue aspirin 81 mg daily for stroke prevention.   Will reattempt Aricept-start 5 mg daily at bedtime   Check EMG/NCS for evaluation of idiopathic peripheral neuropathy   Increase gabapentin to 900 mg 3 times daily.  Patient recommended to titrate up gradually, educated on side effects of drowsiness  and dizziness.  Recommended not to drive until he is aware of how increased dose will affect him.  [facility] report #[id number] reviewed.   Blood pressure control to <130/80   Goal LDL <70-on Zetia    Serum glucose < 140   Call 911 for stroke any stroke symptoms   Follow-up in 6 months with repeat MoCA.    Greater than 40 minutes spent in review of the chart, discussion with and examination of patient, and and documentation.

## 2021-11-18 ENCOUNTER — OFFICE VISIT (OUTPATIENT)
Dept: NEUROLOGY | Facility: CLINIC | Age: 81
End: 2021-11-18

## 2021-11-18 VITALS
SYSTOLIC BLOOD PRESSURE: 100 MMHG | BODY MASS INDEX: 28.59 KG/M2 | OXYGEN SATURATION: 98 % | HEART RATE: 73 BPM | WEIGHT: 205 LBS | DIASTOLIC BLOOD PRESSURE: 74 MMHG

## 2021-11-18 DIAGNOSIS — G31.84 MCI (MILD COGNITIVE IMPAIRMENT): Primary | ICD-10-CM

## 2021-11-18 DIAGNOSIS — Z86.73 HISTORY OF STROKE: ICD-10-CM

## 2021-11-18 DIAGNOSIS — G60.3 IDIOPATHIC PROGRESSIVE NEUROPATHY: ICD-10-CM

## 2021-11-18 PROBLEM — I63.9 CVA (CEREBRAL VASCULAR ACCIDENT): Status: RESOLVED | Noted: 2020-07-23 | Resolved: 2021-11-18

## 2021-11-18 PROCEDURE — 99215 OFFICE O/P EST HI 40 MIN: CPT | Performed by: NURSE PRACTITIONER

## 2021-11-18 RX ORDER — GABAPENTIN 300 MG/1
900 CAPSULE ORAL 3 TIMES DAILY
Qty: 270 CAPSULE | Refills: 5 | Status: SHIPPED | OUTPATIENT
Start: 2021-11-18 | End: 2022-05-24

## 2021-11-18 RX ORDER — UBIDECARENONE 100 MG
200 CAPSULE ORAL DAILY
COMMUNITY

## 2021-11-18 RX ORDER — DONEPEZIL HYDROCHLORIDE 5 MG/1
5 TABLET, FILM COATED ORAL NIGHTLY
Qty: 30 TABLET | Refills: 2 | Status: SHIPPED | OUTPATIENT
Start: 2021-11-18 | End: 2022-02-07 | Stop reason: SDUPTHER

## 2022-02-07 RX ORDER — DONEPEZIL HYDROCHLORIDE 5 MG/1
5 TABLET, FILM COATED ORAL NIGHTLY
Qty: 30 TABLET | Refills: 2 | Status: CANCELLED | OUTPATIENT
Start: 2022-02-07 | End: 2023-02-07

## 2022-02-07 RX ORDER — DONEPEZIL HYDROCHLORIDE 5 MG/1
5 TABLET, FILM COATED ORAL NIGHTLY
Qty: 30 TABLET | Refills: 2 | Status: SHIPPED | OUTPATIENT
Start: 2022-02-07 | End: 2022-05-25 | Stop reason: SDUPTHER

## 2022-02-07 NOTE — TELEPHONE ENCOUNTER
Caller: NadineSatnam    Relationship: Emergency Contact    Best call back number: [phone]    Requested Prescriptions:   Requested Prescriptions     Pending Prescriptions Disp Refills   • donepezil (Aricept) 5 MG tablet 30 tablet 2     Sig: Take 1 tablet by mouth Every Night.        Pharmacy where request should be sent:    [facility] [phone]    Additional details provided by patient: PT  HAS ABOUT ONE WEEK SUPPLY    Does the patient have less than a 3 day supply:  [] Yes  [x] No    Guille Monzon Rep   02/07/22 10:07 EST

## 2022-02-18 ENCOUNTER — PROCEDURE VISIT (OUTPATIENT)
Dept: NEUROLOGY | Facility: CLINIC | Age: 82
End: 2022-02-18

## 2022-02-18 VITALS — WEIGHT: 205 LBS | BODY MASS INDEX: 28.7 KG/M2 | HEIGHT: 71 IN

## 2022-02-18 DIAGNOSIS — G60.3 IDIOPATHIC PROGRESSIVE NEUROPATHY: ICD-10-CM

## 2022-02-18 PROCEDURE — 95886 MUSC TEST DONE W/N TEST COMP: CPT | Performed by: PSYCHIATRY & NEUROLOGY

## 2022-02-18 PROCEDURE — 95909 NRV CNDJ TST 5-6 STUDIES: CPT | Performed by: PSYCHIATRY & NEUROLOGY

## 2022-02-18 NOTE — PROGRESS NOTES
EMG and Nerve Conduction Studies    I.      Instrument used: Neuromax 1002  II.     Please see data sheets for tabular summary of NCS and details on methods, temperatures and lab standards.   III.    EMG muscles tested for upper extremity studies include the deltoid, biceps, triceps, pronator teres, extensor digitorum communis, first dorsal interosseous and abductor pollicis brevis.    IV.   EMG muscles tested for lower extremity studies include the vastus lateralis, tibialis anterior, peroneus longus, medial gastrocnemius and extensor digitorum brevis.    V.    Additional muscles tested as needed.  Paraspinal muscles tested as needed.   VI.   Please see data sheets for tabular summary of EMG findings.   VII. The complete report includes the data sheets.      Indication: Numbness and pain in the legs and feet  History: 81-year-old white male with numbness and pain in the legs and feet for several years.  He was found to have severe spinal stenosis and had a multilevel decompression with rods and fusions which he states did not help his symptoms at all.  He indicates no diabetes history but does indicate some elevations in a previous hemoglobin A1c was 5.9% consistent with prediabetes.  He states the gabapentin does help his pain but does not eliminate it.      Ht: 180.3 cm  Wt: 93 kg; BMI 28.59  HbA1C:   Lab Results   Component Value Date    HGBA1C 5.9 (H) 01/06/2022     TSH:   Lab Results   Component Value Date    TSH 3.100 09/22/2020       Technical summary:  Nerve conduction studies were obtained in the left leg with 1 comparison on the right.  His feet were extremely cold and difficult to warm.  Temperature correction was used if indicated.  Needle examination was obtained on selected muscles in both legs.  The patient did not tolerate needle exam well.    Results:  1.  Absent left sural sensory potential.  2.  Absent superficial peroneal sensory potentials bilaterally.  3.  Slow left peroneal motor conduction  velocity below the knee at 31.6 m/s with normal velocity in the short segment across the fibular head.  Normal distal latency with very low amplitude of 0.150 mV from ankle stimulation.  4.  Slow left tibial motor velocity at 32.2 m/s with normal distal latency with temperature correction.  The amplitude was low at 1.6 mV from ankle stimulation.  5.  Needle examination of selected muscles in both legs showed fibrillations and positive sharp waves in the tibialis anterior and peroneus longus muscles bilaterally as well as the left medial gastrocnemius and right lateral gastrocnemius.  All of these muscles showed an increased number of large motor units with increased firing rates and reduced interference patterns.  The right medial gastrocnemius was fibrous and there was poor activation.  The extensor digitorum brevis muscles showed no clear insertional activity or motor units.  The vastus lateralis muscles showed normal insertional activities, motor units and essentially full interference patterns.  Lumbar paraspinals were not studied due to previous surgery.    Impression:  Abnormal study showing severe polyneuropathy.  I cannot entirely exclude superimposed bilateral L5 or S1 radiculopathies.  There is acute denervation which is present more than 2 years after his surgery indicating an ongoing problem with denervation.  Study results were discussed with the patient and his wife who was present..    Iggy Paez M.D.              Dictated utilizing Dragon dictation.

## 2022-02-22 ENCOUNTER — DOCUMENTATION (OUTPATIENT)
Dept: NEUROLOGY | Facility: CLINIC | Age: 82
End: 2022-02-22

## 2022-02-23 ENCOUNTER — DOCUMENTATION (OUTPATIENT)
Dept: NEUROLOGY | Facility: CLINIC | Age: 82
End: 2022-02-23

## 2022-02-23 ENCOUNTER — TELEPHONE (OUTPATIENT)
Dept: NEUROLOGY | Facility: CLINIC | Age: 82
End: 2022-02-23

## 2022-02-23 NOTE — TELEPHONE ENCOUNTER
Caller: SANDRA    Relationship: WIFE    Best call back number: [phone]    What was the call regarding: THEY ARE RETURNING YOUR CALL BELIEVE FOR EMG TEST.     Do you require a callback: YES PLEASE    PLEASE ADVISE

## 2022-02-24 ENCOUNTER — DOCUMENTATION (OUTPATIENT)
Dept: NEUROLOGY | Facility: CLINIC | Age: 82
End: 2022-02-24

## 2022-02-24 NOTE — PROGRESS NOTES
Reviewed EMG findings (below) with patient/wife over the telephone.     Abnormal study showing severe polyneuropathy.  I cannot entirely exclude superimposed bilateral L5 or S1 radiculopathies.  There is acute denervation which is present more than 2 years after his surgery indicating an ongoing problem with denervation.  Study results were discussed with the patient and his wife who was present..    The patient notes ongoing pain but does not clearly have worsening weakness. MRI L spine offered. As he does not think he would go through another surgery he is unsure if he wants to pursue MRI L spine. He will think about it and let me know.

## 2022-02-25 DIAGNOSIS — M48.062 SPINAL STENOSIS OF LUMBAR REGION WITH NEUROGENIC CLAUDICATION: ICD-10-CM

## 2022-02-25 DIAGNOSIS — G60.3 IDIOPATHIC PROGRESSIVE NEUROPATHY: Primary | ICD-10-CM

## 2022-02-25 NOTE — TELEPHONE ENCOUNTER
I placed order for MRI L spine. Please let him know since I do not deal with knees I cannot order the MRI for his knees, they should check with another provider such as Dr Bello.

## 2022-02-25 NOTE — TELEPHONE ENCOUNTER
Caller: Satnam Mccoy    Relationship: Emergency Contact; [employer]OUSE    Best call back number: [phone]    What was the call regarding: PT'S WIFE CALLED TO INFORM [facility] PANTOJA THAT SHE AND PT HAVE DISCUSSED FURTHER AND HAVE DECIDED TO GO FORWARD WITH ORDERING/COMPLETEING MRI LSPINE.    PT'S WIFE ASKS IF MRI BILATERAL KNEE COULD BE ORDERED AS WELL. WAS WONDERING IF THIS IS A POSSIBILITY AS PT HAS BEEN HAVING ISSUES WITH HIS KNEES.    PT'S WIFE DOES NOTE THAT SHE AND PT WILL BE MOVING SOON, SO MRI'S WOULD NOT BE COMPLETED UNTIL LATE MARCH 2022.    Do you require a callback: YES, PLEASE.    PLEASE REVIEW AND ADVISE.

## 2022-03-08 ENCOUNTER — HOSPITAL ENCOUNTER (OUTPATIENT)
Dept: MRI IMAGING | Facility: HOSPITAL | Age: 82
Discharge: HOME | End: 2022-03-08
Admitting: NURSE PRACTITIONER

## 2022-03-08 DIAGNOSIS — G60.3 IDIOPATHIC PROGRESSIVE NEUROPATHY: ICD-10-CM

## 2022-03-08 DIAGNOSIS — M48.062 SPINAL STENOSIS OF LUMBAR REGION WITH NEUROGENIC CLAUDICATION: ICD-10-CM

## 2022-03-08 PROCEDURE — 72148 MRI LUMBAR SPINE W/O DYE: CPT

## 2022-05-24 ENCOUNTER — OFFICE VISIT (OUTPATIENT)
Dept: NEUROLOGY | Facility: CLINIC | Age: 82
End: 2022-05-24

## 2022-05-24 VITALS
HEART RATE: 80 BPM | OXYGEN SATURATION: 95 % | DIASTOLIC BLOOD PRESSURE: 70 MMHG | HEIGHT: 71 IN | SYSTOLIC BLOOD PRESSURE: 112 MMHG | BODY MASS INDEX: 28.82 KG/M2 | WEIGHT: 205.9 LBS

## 2022-05-24 DIAGNOSIS — G31.84 MCI (MILD COGNITIVE IMPAIRMENT): ICD-10-CM

## 2022-05-24 DIAGNOSIS — Z86.73 HISTORY OF STROKE: ICD-10-CM

## 2022-05-24 DIAGNOSIS — G60.3 IDIOPATHIC PROGRESSIVE NEUROPATHY: Primary | ICD-10-CM

## 2022-05-24 PROBLEM — R93.89 ABNORMAL CHEST X-RAY: Status: RESOLVED | Noted: 2020-07-22 | Resolved: 2022-05-24

## 2022-05-24 PROBLEM — J18.9 HOSPITAL-ACQUIRED PNEUMONIA: Status: RESOLVED | Noted: 2019-09-04 | Resolved: 2022-05-24
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
## 2022-05-24 PROBLEM — R50.9 FEVER IN ADULT: Status: RESOLVED | Noted: 2019-09-04 | Resolved: 2022-05-24

## 2022-05-24 PROCEDURE — 99215 OFFICE O/P EST HI 40 MIN: CPT | Performed by: NURSE PRACTITIONER

## 2022-05-24 RX ORDER — PREGABALIN 50 MG/1
100 CAPSULE ORAL 2 TIMES DAILY
Qty: 120 CAPSULE | Refills: 1 | Status: SHIPPED | OUTPATIENT
Start: 2022-05-24 | End: 2022-06-10 | Stop reason: ALTCHOICE

## 2022-05-24 NOTE — PATIENT INSTRUCTIONS
Decrease gabapentin to 2 pills three times a day for 2 days, then stop. Start lyrica 50 mg twice a day for 3 days then increase to 100 mg twice a day    Call me in a week, we will probably need to adjust the dose    Fall Prevention in the Home, Adult  Falls can cause injuries and can affect people from all age groups. There are many simple things that you can do to make your home safe and to help prevent falls. Ask for help when making these changes, if needed.  What actions can I take to prevent falls?  General instructions  · Use good lighting in all rooms. Replace any light bulbs that burn out.  · Turn on lights if it is dark. Use night-lights.  · Place frequently used items in easy-to-reach places. Lower the shelves around your home if necessary.  · Set up furniture so that there are clear paths around it. Avoid moving your furniture around.  · Remove throw rugs and other tripping hazards from the floor.  · Avoid walking on wet floors.  · Fix any uneven floor surfaces.  · Add color or contrast paint or tape to grab bars and handrails in your home. Place contrasting color strips on the first and last steps of stairways.  · When you use a stepladder, make sure that it is completely opened and that the sides are firmly locked. Have someone hold the ladder while you are using it. Do not climb a closed stepladder.  · Be aware of any and all pets.  What can I do in the bathroom?         · Keep the floor dry. Immediately clean up any water that spills onto the floor.  · Remove soap buildup in the tub or shower on a regular basis.  · Use non-skid mats or decals on the floor of the tub or shower.  · Attach bath mats securely with double-sided, non-slip rug tape.  · If you need to sit down while you are in the shower, use a plastic, non-slip stool.  · Install grab bars by the toilet and in the tub and shower. Do not use towel bars as grab bars.  What can I do in the bedroom?  · Make sure that a bedside light is easy to  reach.  · Do not use oversized bedding that drapes onto the floor.  · Have a firm chair that has side arms to use for getting dressed.  What can I do in the kitchen?  · Clean up any spills right away.  · If you need to reach for something above you, use a sturdy step stool that has a grab bar.  · Keep electrical cables out of the way.  · Do not use floor polish or wax that makes floors slippery. If you must use wax, make sure that it is non-skid floor wax.  What can I do in the stairways?  · Do not leave any items on the stairs.  · Make sure that you have a light switch at the top of the stairs and the bottom of the stairs. Have them installed if you do not have them.  · Make sure that there are handrails on both sides of the stairs. Fix handrails that are broken or loose. Make sure that handrails are as long as the stairways.  · Install non-slip stair treads on all stairs in your home.  · Avoid having throw rugs at the top or bottom of stairways, or secure the rugs with carpet tape to prevent them from moving.  · Choose a carpet design that does not hide the edge of steps on the stairway.  · Check any carpeting to make sure that it is firmly attached to the stairs. Fix any carpet that is loose or worn.  What can I do on the outside of my home?  · Use bright outdoor lighting.  · Regularly repair the edges of walkways and driveways and fix any cracks.  · Remove high doorway thresholds.  · Trim any shrubbery on the main path into your home.  · Regularly check that handrails are securely fastened and in good repair. Both sides of any steps should have handrails.  · Install guardrails along the edges of any raised decks or porches.  · Clear walkways of debris and clutter, including tools and rocks.  · Have leaves, snow, and ice cleared regularly.  · Use sand or salt on walkways during winter months.  · In the garage, clean up any spills right away, including grease or oil spills.  What other actions can I take?  · Wear  closed-toe shoes that fit well and support your feet. Wear shoes that have rubber soles or low heels.  · Use mobility aids as needed, such as canes, walkers, scooters, and crutches.  · Review your medicines with your health care provider. Some medicines can cause dizziness or changes in blood pressure, which increase your risk of falling.  Talk with your health care provider about other ways that you can decrease your risk of falls. This may include working with a physical therapist or  to improve your strength, balance, and endurance.  Where to find more information  · Centers for Disease Control and Prevention, [place]: https://www.cdc.gov  · National [place] on Aging: https://hx1voql.nicolette.nih.gov  Contact a health care provider if:  · You are afraid of falling at home.  · You feel weak, drowsy, or dizzy at home.  · You fall at home.  Summary  · There are many simple things that you can do to make your home safe and to help prevent falls.  · Ways to make your home safe include removing tripping hazards and installing grab bars in the bathroom.  · Ask for help when making these changes in your home.  This information is not intended to replace advice given to you by your health care provider. Make sure you discuss any questions you have with your health care provider.  Document Revised: 11/30/2018 Document Reviewed: 08/02/2018  Elsevier Patient Education © 2021 [employer]

## 2022-05-24 NOTE — PROGRESS NOTES
CC: Follow-up for MCI, stroke, peripheral neuropathy    HPI:  Nolan Mccoy Jr. is a  81 y.o.  right-handed male with HTN, HLD, prediabetes, BPH, chronic low back pain status post previous laminectomy and fusion who is being seen in follow-up today for MCI, history of stroke, and peripheral neuropathy.  He is accompanied by his wife.  I last saw him in November 2021.    Following history was reviewed and updated as appropriate:  In July 2020 the patient had a small acute infarct in the left internal capsule.  CTA head/neck showed plaque moderately narrowing the left vertebral artery as well as stenotic right vertebral artery.  2D echo showed EF of 61 to 65%, normal left atrial size, and saline test results were negative.  He was started on DAPT for 90 days then recommended to continue aspirin.  He was also started on Lipitor 80 mg though his wife felt he had cognitive side effects to this so he was switched to Zetia.  There was no atrial fibrillation on 48-hour Holter.      Following his stroke his wife reported issues with memory loss and the patient.  In September 2020 MoCA score was 19 out of 30 and it was 20 out of 30 in April 2021.  He underwent neuropsych testing with [facility] in October 2021 and received a diagnosis of mild cognitive impairment.  In November 2021 MoCA score was improved to 24/30, and remains 24 out of 30 today.  I started him on Aricept 5 mg nightly last visit and he denies any side effects to this.    He has had symptoms of peripheral neuropathy for over 10 years.  He had an L2-S1 laminectomy and fusion by Dr. Shea and Dr. Little in 2019 in hopes that it would improve his symptoms but it did not.His B12 was borderline at 392 in 2019.  He has since been taking B complex vitamins.When I saw him in September 2020 I checked additional labs for treatable causes of peripheral neuropathy: TSH was 3.1, free T4 was 1.01, heavy metal screen was normal, sedimentation rate was 3, folate  was greater than 20, immunofixation showed no monoclonality/M spike, cryoglobulin was not detected, and RPR was nonreactive.  Lipid panel completed this month showed LDL of 71, total cholesterol 132, triglycerides 125, HDL 36.  Repeat B12 level was 488 in April 2021.    EMG done in February 2022 showed severe polyneuropathy, superimposed bilateral L5 or S1 radiculopathies cannot be entirely excluded.  There was acute denervation.  Based on this I ordered an MRI lumbar spine without contrast which showed his previous L2-L5 laminectomy with L2-S1 posterior fusion with degenerative changes but no recurrent stenosis.  There was a mild T12 compression fracture noted which was felt to be chronic.  Genetic testing was offered but declined.    Last visit I increased his gabapentin to 900 mg 3 times a day for painful neuropathy symptoms in his legs.  He has not had any significant side effects on this but denies any improvement.  He request to try Lyrica instead.  He continues to use a cane and has not had any falls since our last visit..    Since our last visit he and his wife moved to a single level patio home due to concern for falls and decreased mobility.    Today his wife notes that there is a 60-year-old daughter had an embolic stroke.  Family history is also significant for peripheral neuropathy in his father, paternal uncle, and grandfather.  His mother had Alzheimer's.      Gabapentin?  Past Medical History:   Diagnosis Date   • Arthritis    • BPH (benign prostatic hyperplasia)    • Cataracts, bilateral    • Fractures    • GERD (gastroesophageal reflux disease)    • History of transfusion    • HL (hearing loss)    • Hypertension    • Injury of back    • Numbness    • Peripheral neuropathy    • Prostatitis    • Right bundle branch block    • Spinal stenosis of lumbar region     NUMBNESS/ TINGLING BILAT FEET, PAIN DOWN LEFT LEG    • Stroke (HCC)          Past Surgical History:   Procedure Laterality Date   • BACK  SURGERY     • COLONOSCOPY     • FRACTURE SURGERY      LEFT LEG COMPOUND FRACTURE AGE 10   • LUMBAR DISCECTOMY FUSION INSTRUMENTATION N/A 8/12/2019    Procedure: L2-S1 Laminectomy and Fusion with Instrumentation with Dr. Luong and Dr. Little;  Surgeon: Alex Little MD;  Location: [facility];  Service: Neurosurgery   • LUMBAR LAMINECTOMY DISCECTOMY DECOMPRESSION N/A 8/12/2019    Procedure: L2-S1 laminectomy with a fusion by orthopedics;  Surgeon: Adin Luong MD;  Location: [facility];  Service: Neurosurgery   • TONSILLECTOMY      age 7           Current Outpatient Medications:   •  acetaminophen (TYLENOL) 325 MG tablet, Take 650 mg by mouth Every 6 (Six) Hours As Needed for Mild Pain ., Disp: , Rfl:   •  aspirin 81 MG EC tablet, Take 81 mg by mouth Daily., Disp: , Rfl:   •  B Complex Vitamins (VITAMIN B COMPLEX PO), Take  by mouth., Disp: , Rfl:   •  Ca Phosphate-Cholecalciferol (CALCIUM/VITAMIN D3 GUMMIES PO), Take  by mouth., Disp: , Rfl:   •  calcium carbonate (TUMS) 500 MG chewable tablet, Chew 1 tablet Every 4 (Four) Hours As Needed for Indigestion or Heartburn., Disp: , Rfl:   •  donepezil (Aricept) 5 MG tablet, Take 1 tablet by mouth Every Night., Disp: 30 tablet, Rfl: 2  •  dutasteride (AVODART) 0.5 MG capsule, Take 0.5 mg by mouth Daily., Disp: , Rfl:   •  lisinopril (PRINIVIL,ZESTRIL) 5 MG tablet, Take 5 mg by mouth Daily., Disp: , Rfl:   •  Multiple Vitamins-Minerals (MULTIVITAMIN WITH MINERALS) tablet, Take 1 tablet by mouth Daily., Disp: , Rfl:   •  tamsulosin (FLOMAX) 0.4 MG capsule 24 hr capsule, Take 1 capsule by mouth Daily., Disp: , Rfl:   •  valACYclovir (VALTREX) 1000 MG tablet, Take 1 tablet by mouth 3 (Three) Times a Day., Disp: 21 tablet, Rfl: 0  •  vitamin D (ERGOCALCIFEROL) 1.25 MG (62170 UT) capsule capsule, Take 50,000 Units by mouth 1 (One) Time Per Week., Disp: , Rfl:   •  coenzyme Q10 100 MG capsule, Take 200 mg by mouth Daily., Disp: , Rfl:   •  Cyanocobalamin (VITAMIN  B12 PO), Take  by mouth., Disp: , Rfl:   •  ezetimibe (ZETIA) 10 MG tablet, Take 10 mg by mouth Daily., Disp: , Rfl:   •  lisinopril (PRINIVIL,ZESTRIL) 5 MG tablet, Take 2.5 mg by mouth Daily., Disp: , Rfl:   •  pregabalin (LYRICA) 50 MG capsule, Take 2 capsules by mouth 2 (Two) Times a Day for 30 days., Disp: 120 capsule, Rfl: 1      Family History   Problem Relation Age of Onset   • Hypertension Father    • Neuropathy Father    • Diabetes Sister    • Alzheimer's disease Mother    • Cancer Son    • Malig Hyperthermia Neg Hx          Social History     Socioeconomic History   • Marital status:    Tobacco Use   • Smoking status: Never Smoker   • Smokeless tobacco: Never Used   Substance and Sexual Activity   • Alcohol use: Yes     Alcohol/week: 4.0 standard drinks     Types: 2 Glasses of wine, 2 Cans of beer per week     Comment: 1 drink every other day occasionally   • Drug use: Never     Comment: CBD Oil   • Sexual activity: Defer         Allergies   Allergen Reactions   • Sulfa Antibiotics Rash   • Penicillins Other (See Comments)     Tolerated cefazolin August 2019  CAUSED RASH BACK OF THROAT   • Sulfa Antibiotics Hives         Pain Scale:        ROS:  Review of Systems   Constitutional: Negative for activity change, appetite change, fatigue and unexpected weight change.   HENT: Positive for hearing loss. Negative for ear pain, nosebleeds, tinnitus and voice change.    Eyes: Negative for photophobia, pain and visual disturbance.   Respiratory: Negative for choking, chest tightness, shortness of breath and wheezing.    Cardiovascular: Negative for chest pain, palpitations and leg swelling.   Musculoskeletal: Positive for gait problem (balance-cane).   Neurological: Negative for dizziness, speech difficulty, weakness, light-headedness and headaches.   Psychiatric/Behavioral: Positive for confusion and decreased concentration.     ROS completed by the MA was reviewed by me and I agree.    Physical  "Exam:  Vitals:    05/24/22 1547   BP: 112/70   Pulse: 80   SpO2: 95%   Weight: 93.4 kg (205 lb 14.4 oz)   Height: 180.3 cm (71\")     Orthostatic BP:    Body mass index is 28.72 kg/m².    Physical Examination:   General Appearance:           Well developed, well nourished, well groomed, alert, and cooperative.  HEENT:            Normocephalic.    Cardiac:                                 Regular rate and rhythm. No murmurs.  Lungs:    Clear to auscultation bilaterally.  Normal respiratory effort.  Peripheral Vasculature:       Radial pulses are equal and symmetric.   Extremities:                           No edema or deformities.       Neurological examination:  Higher Integrative  Function:        Awake/alert.    Oriented 6 out of 6 on the [place], he scored 24 out of 30 missing points for fluency and delayed recall. No neglect.  CN II:   Pupils are equal, round, and reactive to light. Normal visual fields.    CN III IV VI:     Extraocular movements are full without nystagmus.   CN V:  Normal facial sensation.  CN VII:            Facial movements are symmetric. No weakness.  CN VIII:                                   Auditory acuity is decreased.  CN IX & X:                              Symmetric palatal movement.  CN XI: Sternocleidomastoid and trapezius are normal.  No weakness.  CN XII:                                    The tongue is midline.  No atrophy or fasciculations.  Motor: Normal muscle strength, bulk and tone in upper and lower extremities except dorsiflexion is 4 out of 5.  No fasciculations, rigidity, spasticity, or abnormal movements.  Sensation:      Decreased to light touch and pinprick up to his mid shin bilaterally.   Station and Gait:       Mildly broad-based, antalgic, uses cane.  Coordination:            Finger to nose test shows no dysmetria. Heel to shin normal.  The patient was reexamined, changes noted.    Results:      Lab Results   Component Value Date    GLUCOSE 112 (H) 04/09/2021    BUN 16 " 04/12/2022    CREATININE 1.09 04/12/2022    EGFRIFNONA 71 04/09/2021    BCR 14.2 04/12/2022    CO2 23 04/12/2022    CALCIUM 9.0 04/12/2022    PROTENTOTREF 6.7 09/22/2020    ALBUMIN 4.1 04/12/2022    LABIL2 1.6 04/12/2022    AST 25 04/12/2022    ALT 18 04/12/2022       Lab Results   Component Value Date    WBC 5.75 03/11/2021    HGB 16.2 03/11/2021    HCT 45.4 03/11/2021    MCV 91.5 03/11/2021     (L) 03/11/2021         .  Lab Results   Component Value Date    RPR Non-Reactive 09/22/2020         Lab Results   Component Value Date    TSH 3.100 09/22/2020         Lab Results   Component Value Date    LMWPGGLA56 488 04/09/2021         Lab Results   Component Value Date    FOLATE >20.00 09/22/2020         Lab Results   Component Value Date    HGBA1C 5.8 (H) 04/12/2022         Lab Results   Component Value Date    GLUCOSE 112 (H) 04/09/2021    BUN 16 04/12/2022    CREATININE 1.09 04/12/2022    EGFRIFNONA 71 04/09/2021    BCR 14.2 04/12/2022    K 4.7 04/12/2022    CO2 23 04/12/2022    CALCIUM 9.0 04/12/2022    PROTENTOTREF 6.7 09/22/2020    ALBUMIN 4.1 04/12/2022    LABIL2 1.6 04/12/2022    AST 25 04/12/2022    ALT 18 04/12/2022         Lab Results   Component Value Date    WBC 5.75 03/11/2021    HGB 16.2 03/11/2021    HCT 45.4 03/11/2021    MCV 91.5 03/11/2021     (L) 03/11/2021             Assessment/Plan:    Diagnoses and all orders for this visit:    1. Idiopathic progressive neuropathy (Primary)  -     pregabalin (LYRICA) 50 MG capsule; Take 2 capsules by mouth 2 (Two) Times a Day for 30 days.  Dispense: 120 capsule; Refill: 1    2. History of stroke    3. MCI (mild cognitive impairment)        Continue aspirin 81 mg daily for secondary stroke prevention   Continue Aricept 5 mg nightly, consider increasing at next visit   Will change gabapentin to Lyrica 100 mg twice daily.  Instructions given to patient and wife on how to do this.  Taper gabapentin and titrate Lyrica  fairly quickly.  They will check in  with me in a week or so via telephone to let me know how he's  doing in case we need to adjust the Lyrica dose   Blood pressure control to <130/80   Goal LDL <70- reported intolerance to statins, continue Zetia    Serum glucose < 140   Call 911 for stroke any stroke symptoms   Follow-up in 3 mo or sooner if needed.         >40 min spent in review of chart, d/w pt/wife, examination of patient, education/coordination of care, and documentation.           Dictated utilizing Dragon dictation.

## 2022-05-25 RX ORDER — DONEPEZIL HYDROCHLORIDE 5 MG/1
5 TABLET, FILM COATED ORAL NIGHTLY
Qty: 90 TABLET | Refills: 0 | Status: SHIPPED | OUTPATIENT
Start: 2022-05-25 | End: 2022-08-18 | Stop reason: SDUPTHER

## 2022-05-25 NOTE — TELEPHONE ENCOUNTER
Noted in ov notes that provider will consider increasing medication in question at next ov.  Wife voices understanding.  Requesting rill, today. Sent to pharmacy.

## 2022-05-25 NOTE — TELEPHONE ENCOUNTER
Caller: SANDRA    Relationship: WIFE    Best call back number: [phone]    Requested Prescriptions:   DONEPEZIL 5 MG ? YOU WERE GOING TO INCREASE THIS RX     Requested Prescriptions      No prescriptions requested or ordered in this encounter        Pharmacy where request should be sent:  [facility] [address] [place] [address] [address] AT [facility] - [phone]  - [phone]   [phone]    Additional details provided by patient: YOU WERE GOING TO CALL THIS RX IN WITH INCREASED DOSAGE     Does the patient have less than a 3 day supply:  [x] Yes  [] No    PLEASE ADVISE.     Guille PAGAN Rep   05/25/22 09:03 EDT

## 2022-06-09 ENCOUNTER — TELEPHONE (OUTPATIENT)
Dept: NEUROLOGY | Facility: CLINIC | Age: 82
End: 2022-06-09

## 2022-06-09 NOTE — TELEPHONE ENCOUNTER
Caller: Satnam Mccoy    Relationship: Emergency Contact; SPOUSE    Best call back number: [phone]    Preferred pharmacy: [address] AT [facility] [phone] [facility] [phone] FX    What medications are you currently taking:   Current Outpatient Medications on File Prior to Visit   Medication Sig Dispense Refill   • acetaminophen (TYLENOL) 325 MG tablet Take 650 mg by mouth Every 6 (Six) Hours As Needed for Mild Pain .     • aspirin 81 MG EC tablet Take 81 mg by mouth Daily.     • B Complex Vitamins (VITAMIN B COMPLEX PO) Take  by mouth.     • Ca Phosphate-Cholecalciferol (CALCIUM/VITAMIN D3 GUMMIES PO) Take  by mouth.     • calcium carbonate (TUMS) 500 MG chewable tablet Chew 1 tablet Every 4 (Four) Hours As Needed for Indigestion or Heartburn.     • coenzyme Q10 100 MG capsule Take 200 mg by mouth Daily.     • Cyanocobalamin (VITAMIN B12 PO) Take  by mouth.     • donepezil (Aricept) 5 MG tablet Take 1 tablet by mouth Every Night. 90 tablet 0   • dutasteride (AVODART) 0.5 MG capsule Take 0.5 mg by mouth Daily.     • ezetimibe (ZETIA) 10 MG tablet Take 10 mg by mouth Daily.     • lisinopril (PRINIVIL,ZESTRIL) 5 MG tablet Take 2.5 mg by mouth Daily.     • lisinopril (PRINIVIL,ZESTRIL) 5 MG tablet Take 5 mg by mouth Daily.     • Multiple Vitamins-Minerals (MULTIVITAMIN WITH MINERALS) tablet Take 1 tablet by mouth Daily.     • pregabalin (LYRICA) 50 MG capsule Take 2 capsules by mouth 2 (Two) Times a Day for 30 days. 120 capsule 1   • tamsulosin (FLOMAX) 0.4 MG capsule 24 hr capsule Take 1 capsule by mouth Daily.     • valACYclovir (VALTREX) 1000 MG tablet Take 1 tablet by mouth 3 (Three) Times a Day. 21 tablet 0   • vitamin D (ERGOCALCIFEROL) 1.25 MG (59241 UT) capsule capsule Take 50,000 Units by mouth 1 (One) Time Per Week.       No current facility-administered medications on file prior to visit.     Which medication are you concerned  about: pregabalin (LYRICA) 50 MG capsule- Take 2 capsules by mouth 2 (Two) Times a Day for 30 days.    Who prescribed you this medication: CARMEN PANTOJA    When did you start taking these medications: 5/24/22    What are your concerns: PT'S WIFE STATES THAT SINCE PT HAS WEANED COMPLETELY OFF OF THE GABAPENTIN MEDICATION AND STARTED LYRICA MEDICATION, PT HAS BEEN EXPERIENCING SEVERE BACK PAIN MAKING IT DIFFICULT FOR HIM TO WALK. PT'S WIFE STATES THAT THEY HAVE LEFT OVER GABAPENTIN MEDICATION FROM PREVIOUS PRESCRIPTION & THAT PT IS REQUESTING TO STOP LYRICA & RESTART GABAPENTIN.    PT'S WIFE CALLING TO ASK IF CARMEN PANTOJA IS AGREEABLE TO THIS AND IF SO, TO PROVIDE INSTRUCTIONS ON TAPERING OF LYRICA & TITRATING OF GABAPENTIN.    How long have you had these concerns: 5/24/22    PLEASE REVIEW AND ADVISE.

## 2022-06-10 ENCOUNTER — TELEPHONE (OUTPATIENT)
Dept: NEUROLOGY | Facility: CLINIC | Age: 82
End: 2022-06-10

## 2022-06-10 DIAGNOSIS — G60.3 IDIOPATHIC PROGRESSIVE NEUROPATHY: Primary | ICD-10-CM

## 2022-06-10 RX ORDER — GABAPENTIN 300 MG/1
900 CAPSULE ORAL 3 TIMES DAILY
Qty: 270 CAPSULE | Refills: 5 | Status: SHIPPED | OUTPATIENT
Start: 2022-06-10 | End: 2022-09-27 | Stop reason: SDUPTHER

## 2022-06-10 NOTE — TELEPHONE ENCOUNTER
Caller: SANDRA    Relationship: WEFE    Best call back number: [phone]    What medications are you currently taking:   Current Outpatient Medications on File Prior to Visit   Medication Sig Dispense Refill   • acetaminophen (TYLENOL) 325 MG tablet Take 650 mg by mouth Every 6 (Six) Hours As Needed for Mild Pain .     • aspirin 81 MG EC tablet Take 81 mg by mouth Daily.     • B Complex Vitamins (VITAMIN B COMPLEX PO) Take  by mouth.     • Ca Phosphate-Cholecalciferol (CALCIUM/VITAMIN D3 GUMMIES PO) Take  by mouth.     • calcium carbonate (TUMS) 500 MG chewable tablet Chew 1 tablet Every 4 (Four) Hours As Needed for Indigestion or Heartburn.     • coenzyme Q10 100 MG capsule Take 200 mg by mouth Daily.     • Cyanocobalamin (VITAMIN B12 PO) Take  by mouth.     • donepezil (Aricept) 5 MG tablet Take 1 tablet by mouth Every Night. 90 tablet 0   • dutasteride (AVODART) 0.5 MG capsule Take 0.5 mg by mouth Daily.     • ezetimibe (ZETIA) 10 MG tablet Take 10 mg by mouth Daily.     • lisinopril (PRINIVIL,ZESTRIL) 5 MG tablet Take 2.5 mg by mouth Daily.     • lisinopril (PRINIVIL,ZESTRIL) 5 MG tablet Take 5 mg by mouth Daily.     • Multiple Vitamins-Minerals (MULTIVITAMIN WITH MINERALS) tablet Take 1 tablet by mouth Daily.     • pregabalin (LYRICA) 50 MG capsule Take 2 capsules by mouth 2 (Two) Times a Day for 30 days. 120 capsule 1   • tamsulosin (FLOMAX) 0.4 MG capsule 24 hr capsule Take 1 capsule by mouth Daily.     • valACYclovir (VALTREX) 1000 MG tablet Take 1 tablet by mouth 3 (Three) Times a Day. 21 tablet 0   • vitamin D (ERGOCALCIFEROL) 1.25 MG (54658 UT) capsule capsule Take 50,000 Units by mouth 1 (One) Time Per Week.       No current facility-administered medications on file prior to visit.          When did you start taking these medications: OVER WEEK AGO     Which medication are you concerned about: ALANNA    Who prescribed you this medication: BENJAMIN     What are your concerns: RX HAD A BAD EFFECT  ON PT     How long have you had these concerns: WEEK    PT WENT OFF ALANNA SAID HE COULD HARDLY WALK HIS BACK HURT SO BAD. WIFE HAD PUT HIM BACK ON GABAPENTIN BUT ALMOST OUT WILL NEED RX FOR THAT CALLED IN .      PLEASE CALL AND ADVISE      [facility] - [place], [place] - [address] AT [facility] - [phone]  - [phone]   [phone]

## 2022-08-18 ENCOUNTER — TELEPHONE (OUTPATIENT)
Dept: NEUROLOGY | Facility: CLINIC | Age: 82
End: 2022-08-18

## 2022-08-18 RX ORDER — DONEPEZIL HYDROCHLORIDE 5 MG/1
5 TABLET, FILM COATED ORAL NIGHTLY
Qty: 90 TABLET | Refills: 0 | Status: SHIPPED | OUTPATIENT
Start: 2022-08-18 | End: 2022-09-27

## 2022-08-18 NOTE — TELEPHONE ENCOUNTER
Caller: Satnam Mccoy    Relationship: Emergency Contact    Best call back number: [phone] (M)    Requested Prescriptions:   Requested Prescriptions     Pending Prescriptions Disp Refills   • donepezil (Aricept) 5 MG tablet 90 tablet 0     Sig: Take 1 tablet by mouth Every Night.        Pharmacy where request should be sent: [facility] [address] [place] [address] AT [facility] [phone] [facility] [phone]      Additional details provided by patient: PT WILL BE OUT OF MEDICATION ON Tuesday 8-23-22    Does the patient have less than a 3 day supply:  [] Yes  [x] No    Guille Sainz Rep   08/18/22 09:11 EDT

## 2022-09-27 ENCOUNTER — OFFICE VISIT (OUTPATIENT)
Dept: NEUROLOGY | Facility: CLINIC | Age: 82
End: 2022-09-27

## 2022-09-27 VITALS
DIASTOLIC BLOOD PRESSURE: 70 MMHG | BODY MASS INDEX: 27.72 KG/M2 | OXYGEN SATURATION: 98 % | WEIGHT: 198 LBS | SYSTOLIC BLOOD PRESSURE: 112 MMHG | HEART RATE: 63 BPM | HEIGHT: 71 IN

## 2022-09-27 DIAGNOSIS — Z86.73 HISTORY OF STROKE: ICD-10-CM

## 2022-09-27 DIAGNOSIS — G31.84 MCI (MILD COGNITIVE IMPAIRMENT): ICD-10-CM

## 2022-09-27 DIAGNOSIS — G62.9 NEUROPATHY: Primary | ICD-10-CM

## 2022-09-27 DIAGNOSIS — G60.3 IDIOPATHIC PROGRESSIVE NEUROPATHY: ICD-10-CM

## 2022-09-27 PROCEDURE — 99215 OFFICE O/P EST HI 40 MIN: CPT | Performed by: NURSE PRACTITIONER

## 2022-09-27 RX ORDER — GABAPENTIN 300 MG/1
900 CAPSULE ORAL 3 TIMES DAILY
Qty: 300 CAPSULE | Refills: 5 | Status: SHIPPED | OUTPATIENT
Start: 2022-09-27

## 2022-09-27 RX ORDER — DONEPEZIL HYDROCHLORIDE 10 MG/1
10 TABLET, FILM COATED ORAL NIGHTLY
Qty: 30 TABLET | Refills: 2 | Status: SHIPPED | OUTPATIENT
Start: 2022-09-27 | End: 2022-11-07 | Stop reason: SDUPTHER

## 2022-09-27 NOTE — PROGRESS NOTES
CC: F/U neuropathy and MCI  HPI:  Nolan Mccoy Jr. is a  81 y.o.  right-handed male  HTN, HLD, prediabetes, BPH, chronic low back pain status post previous laminectomy and fusion who is being seen in follow-up today for MCI, history of stroke, and peripheral neuropathy.  He is accompanied by his wife.    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  CTA head/neck showed plaque moderately narrowing the left vertebral artery as well as stenotic right vertebral artery.  2D echo showed EF of 61 to 65%, normal left atrial size, and saline test results were negative.  He was started on DAPT for 90 days then recommended to continue aspirin.  He was also started on Lipitor 80 mg though his wife felt he had cognitive side effects to this so he was switched to Zetia.  There was no atrial fibrillation on 48-hour Holter.  He continues to take aspirin 81 mg daily and Zetia.    Following his stroke his wife reported issues with memory loss and the patient.  In September 2020 MoCA score was 19 out of 30 and it was 20 out of 30 in April 2021.  He underwent neuropsych testing with [facility] in October 2021 and received a diagnosis of mild cognitive impairment.  In November 2021 MoCA score was improved to 24/30, and was 24 out of 30 on his last visit.  He is currently taking Aricept 5 mg nightly without any side effects.    He has had symptoms of peripheral neuropathy for over 10 years.  He had an L2-S1 laminectomy and fusion by Dr. Luong and Dr. Little in 2019 in hopes that it would improve his symptoms but it did not.His B12 was borderline at 392 in 2019.  He took B vitamins for a while after that.  In September 2020 I checked additional labs for treatable causes of peripheral neuropathy: TSH was 3.1, free T4 was 1.01, heavy metal screen was normal, sedimentation rate was 3, folate was greater than 20, immunofixation showed no monoclonality/M spike, cryoglobulin was not detected,  and RPR was nonreactive.  Lipid panel completed this month showed LDL of 71, total cholesterol 132, triglycerides 125, HDL 36.  Repeat B12 level was 488 in April 2021.    EMG done in February 2022 showed severe polyneuropathy, superimposed bilateral L5 or S1 radiculopathies cannot be entirely excluded.  There was acute denervation.  Based on this I ordered an MRI lumbar spine without contrast which showed his previous L2-L5 laminectomy with L2-S1 posterior fusion with degenerative changes but no recurrent stenosis.  There was a mild T12 compression fracture noted which was felt to be chronic.  Genetic testing was offered but declined.    He is currently taking gabapentin 900 mg 3 times daily.  He previously tried Lyrica but had worsening symptoms and went back on gabapentin.    Since our last visit [place]  He states his legs got tired and he was on uneven ground and fell without injury.  He feels the neuropathy in his legs is stable.  He also reports some numbness in the tips of his fingers.    His daughter had an embolic stroke at age 60.  Family history is also significant for peripheral neuropathy in his father, paternal uncle, and grandfather.  His mother had Alzheimer's.    The above history was reviewed and updated  Past Medical History:   Diagnosis Date   • Arthritis    • BPH (benign prostatic hyperplasia)    • Cataracts, bilateral    • Fractures    • GERD (gastroesophageal reflux disease)    • History of transfusion    • HL (hearing loss)    • Hypertension    • Injury of back    • Numbness    • Peripheral neuropathy    • Prostatitis    • Right bundle branch block    • Spinal stenosis of lumbar region     NUMBNESS/ TINGLING BILAT FEET, PAIN DOWN LEFT LEG    • Stroke (HCC)          Past Surgical History:   Procedure Laterality Date   • BACK SURGERY     • COLONOSCOPY     • FRACTURE SURGERY      LEFT LEG COMPOUND FRACTURE AGE 10   • LUMBAR DISCECTOMY FUSION INSTRUMENTATION N/A  8/12/2019    Procedure: L2-S1 Laminectomy and Fusion with Instrumentation with Dr. Luong and Dr. Little;  Surgeon: Alex Little MD;  Location: [facility];  Service: Neurosurgery   • LUMBAR LAMINECTOMY DISCECTOMY DECOMPRESSION N/A 8/12/2019    Procedure: L2-S1 laminectomy with a fusion by orthopedics;  Surgeon: Adin Luong MD;  Location: [facility];  Service: Neurosurgery   • TONSILLECTOMY      age 7           Current Outpatient Medications:   •  acetaminophen (TYLENOL) 325 MG tablet, Take 650 mg by mouth Every 6 (Six) Hours As Needed for Mild Pain ., Disp: , Rfl:   •  aspirin 81 MG EC tablet, Take 81 mg by mouth Daily., Disp: , Rfl:   •  B Complex Vitamins (VITAMIN B COMPLEX PO), Take  by mouth., Disp: , Rfl:   •  Ca Phosphate-Cholecalciferol (CALCIUM/VITAMIN D3 GUMMIES PO), Take  by mouth., Disp: , Rfl:   •  calcium carbonate (TUMS) 500 MG chewable tablet, Chew 1 tablet Every 4 (Four) Hours As Needed for Indigestion or Heartburn., Disp: , Rfl:   •  dutasteride (AVODART) 0.5 MG capsule, Take 0.5 mg by mouth Daily., Disp: , Rfl:   •  gabapentin (NEURONTIN) 300 MG capsule, Take 3 capsules by mouth 3 (Three) Times a Day., Disp: 300 capsule, Rfl: 5  •  Multiple Vitamins-Minerals (MULTIVITAMIN WITH MINERALS) tablet, Take 1 tablet by mouth Daily., Disp: , Rfl:   •  tamsulosin (FLOMAX) 0.4 MG capsule 24 hr capsule, Take 1 capsule by mouth Daily., Disp: , Rfl:   •  valACYclovir (VALTREX) 1000 MG tablet, Take 1 tablet by mouth 3 (Three) Times a Day., Disp: 21 tablet, Rfl: 0  •  vitamin D (ERGOCALCIFEROL) 1.25 MG (42922 UT) capsule capsule, Take 50,000 Units by mouth 1 (One) Time Per Week., Disp: , Rfl:   •  coenzyme Q10 100 MG capsule, Take 200 mg by mouth Daily., Disp: , Rfl:   •  Cyanocobalamin (VITAMIN B12 PO), Take  by mouth., Disp: , Rfl:   •  donepezil (Aricept) 10 MG tablet, Take 1 tablet by mouth Every Night., Disp: 30 tablet, Rfl: 2  •  ezetimibe (ZETIA) 10 MG tablet, Take 10 mg by mouth Daily.,  "Disp: , Rfl:   •  lisinopril (PRINIVIL,ZESTRIL) 5 MG tablet, Take 2.5 mg by mouth Daily., Disp: , Rfl:   •  lisinopril (PRINIVIL,ZESTRIL) 5 MG tablet, Take 5 mg by mouth Daily., Disp: , Rfl:       Family History   Problem Relation Age of Onset   • Hypertension Father    • Neuropathy Father    • Diabetes Sister    • Alzheimer's disease Mother    • Cancer Son    • Malig Hyperthermia Neg Hx          Social History     Socioeconomic History   • Marital status:    Tobacco Use   • Smoking status: Never Smoker   • Smokeless tobacco: Never Used   Substance and Sexual Activity   • Alcohol use: Yes     Alcohol/week: 4.0 standard drinks     Types: 2 Glasses of wine, 2 Cans of beer per week     Comment: 1 drink every other day occasionally   • Drug use: Never     Comment: CBD Oil   • Sexual activity: Defer         Allergies   Allergen Reactions   • Sulfa Antibiotics Rash   • Penicillins Other (See Comments)     Tolerated cefazolin August 2019  CAUSED RASH BACK OF THROAT   • Sulfa Antibiotics Hives         Pain Scale:        ROS:  Review of Systems   Constitutional: Negative for fatigue and unexpected weight change.   HENT: Positive for hearing loss (hearing aid). Negative for ear pain and tinnitus.    Eyes: Negative for photophobia, pain and visual disturbance.   Respiratory: Negative for chest tightness, shortness of breath and wheezing.    Cardiovascular: Negative for chest pain and palpitations.   Musculoskeletal: Positive for gait problem (cane).   Neurological: Positive for numbness (feet-worsening). Negative for tremors and weakness.   Psychiatric/Behavioral: Negative for confusion, decreased concentration and sleep disturbance. The patient is not nervous/anxious.        ROS completed by the MA was reviewed by me and I agree.    Physical Exam:  Vitals:    09/27/22 1432   BP: 112/70   Pulse: 63   SpO2: 98%   Weight: 89.8 kg (198 lb)   Height: 180.3 cm (71\")     Orthostatic BP:    Body mass index is 27.62 " kg/m².    General appearance: Well developed, well nourished, well groomed, alert and cooperative.   HEENT: Normocephalic.   Cardiac: Regular rate and rhythm. No murmurs.   Peripheral Vasculature: Radial pulses are equal and symmetric.  Chest Exam: Clear to auscultation bilaterally, no wheezes, no rhonchi.  Extremities: Normal, no edema.   Skin: No rashes or birthmarks.     Higher integrative function: Oriented to month/year, self, location.  Impaired recent memory.  Normal attention span/concentration.  No neglect.  CN II: Visual fields intact.  CN III IV VI: Extraocular movements are full without nystagmus. Pupils are equal, round, and reactive to light.   CN V: Normal facial sensation.  CN VII: Facial movements are symmetric, no weakness.   CN VIII: Auditory acuity is normal.   CN IX & X: Symmetric palatal movement.   CN XI: Sternocleidomastoid and trapezius are normal. No weakness.   CN XII: The tongue is midline. No atrophy or fasciculations.   Motor: Normal muscle strength, bulk, and tone in upper and lower extremities except some mild weakness noted with dorsiflexion. No fasciculations, rigidity, spasticity or abnormal movements.   Sensation: Decreased to light touch and pinprick in mid shins bilaterally.  Station and gait: Antalgic, uses a cane, mildly broad-based.  Coordination: Finger to nose test showed no dysmetria. Heel to shin normal.        Results:      Lab Results   Component Value Date    GLUCOSE 112 (H) 04/09/2021    BUN 16 04/12/2022    CREATININE 1.09 04/12/2022    EGFRIFNONA 71 04/09/2021    BCR 14.2 04/12/2022    CO2 23 04/12/2022    CALCIUM 9.0 04/12/2022    PROTENTOTREF 6.7 09/22/2020    ALBUMIN 4.1 04/12/2022    LABIL2 1.6 04/12/2022    AST 25 04/12/2022    ALT 18 04/12/2022       Lab Results   Component Value Date    WBC 5.75 03/11/2021    HGB 16.2 03/11/2021    HCT 45.4 03/11/2021    MCV 91.5 03/11/2021     (L) 03/11/2021         .  Lab Results   Component Value Date    RPR  Non-Reactive 09/22/2020         Lab Results   Component Value Date    TSH 3.100 09/22/2020         Lab Results   Component Value Date    [id number] 488 04/09/2021         Lab Results   Component Value Date    FOLATE >20.00 09/22/2020         Lab Results   Component Value Date    HGBA1C 5.8 (H) 04/12/2022         Lab Results   Component Value Date    GLUCOSE 112 (H) 04/09/2021    BUN 16 04/12/2022    CREATININE 1.09 04/12/2022    EGFRIFNONA 71 04/09/2021    BCR 14.2 04/12/2022    K 4.7 04/12/2022    CO2 23 04/12/2022    CALCIUM 9.0 04/12/2022    PROTENTOTREF 6.7 09/22/2020    ALBUMIN 4.1 04/12/2022    LABIL2 1.6 04/12/2022    AST 25 04/12/2022    ALT 18 04/12/2022         Lab Results   Component Value Date    WBC 5.75 03/11/2021    HGB 16.2 03/11/2021    HCT 45.4 03/11/2021    MCV 91.5 03/11/2021     (L) 03/11/2021             Assessment/Plan:        Diagnoses and all orders for this visit:    1. Neuropathy (Primary)    2. Idiopathic progressive neuropathy  -     gabapentin (NEURONTIN) 300 MG capsule; Take 3 capsules by mouth 3 (Three) Times a Day.  Dispense: 300 capsule; Refill: 5    3. MCI (mild cognitive impairment)    4. History of stroke    Other orders  -     donepezil (Aricept) 10 MG tablet; Take 1 tablet by mouth Every Night.  Dispense: 30 tablet; Refill: 2     Continue aspirin 81 mg daily   Continue gabapentin 100 mg 3 times daily, refilled today   Increased Aricept to 10 mg nightly, patient and wife educated on potential side effects   Blood pressure control to <130/80   Goal LDL <70-statin intolerance, continue Zetia    Serum glucose < 140   Call 911 for stroke any stroke symptoms   Follow-up in 6 months with repeat MoCA        Greater than 40 minutes spent in review of the chart, discussion with patient/wife, examination patient, education, and documentation.          Dictated utilizing Dragon dictation.

## 2022-11-07 RX ORDER — DONEPEZIL HYDROCHLORIDE 10 MG/1
10 TABLET, FILM COATED ORAL NIGHTLY
Qty: 90 TABLET | Refills: 3 | Status: SHIPPED | OUTPATIENT
Start: 2022-11-07 | End: 2023-02-21

## 2022-11-22 ENCOUNTER — TELEPHONE (OUTPATIENT)
Dept: NEUROLOGY | Facility: CLINIC | Age: 82
End: 2022-11-22

## 2022-11-22 NOTE — TELEPHONE ENCOUNTER
Caller: Satnam Mccoy    Relationship: Emergency Contact    Best call back number: [phone]  What medications are you currently taking:   Current Outpatient Medications on File Prior to Visit   Medication Sig Dispense Refill   • acetaminophen (TYLENOL) 325 MG tablet Take 650 mg by mouth Every 6 (Six) Hours As Needed for Mild Pain .     • aspirin 81 MG EC tablet Take 81 mg by mouth Daily.     • B Complex Vitamins (VITAMIN B COMPLEX PO) Take  by mouth.     • Ca Phosphate-Cholecalciferol (CALCIUM/VITAMIN D3 GUMMIES PO) Take  by mouth.     • calcium carbonate (TUMS) 500 MG chewable tablet Chew 1 tablet Every 4 (Four) Hours As Needed for Indigestion or Heartburn.     • coenzyme Q10 100 MG capsule Take 200 mg by mouth Daily.     • Cyanocobalamin (VITAMIN B12 PO) Take  by mouth.     • donepezil (Aricept) 10 MG tablet Take 1 tablet by mouth Every Night. 90 tablet 3   • dutasteride (AVODART) 0.5 MG capsule Take 0.5 mg by mouth Daily.     • ezetimibe (ZETIA) 10 MG tablet Take 10 mg by mouth Daily.     • gabapentin (NEURONTIN) 300 MG capsule Take 3 capsules by mouth 3 (Three) Times a Day. 300 capsule 5   • lisinopril (PRINIVIL,ZESTRIL) 5 MG tablet Take 2.5 mg by mouth Daily.     • lisinopril (PRINIVIL,ZESTRIL) 5 MG tablet Take 5 mg by mouth Daily.     • Multiple Vitamins-Minerals (MULTIVITAMIN WITH MINERALS) tablet Take 1 tablet by mouth Daily.     • tamsulosin (FLOMAX) 0.4 MG capsule 24 hr capsule Take 1 capsule by mouth Daily.     • valACYclovir (VALTREX) 1000 MG tablet Take 1 tablet by mouth 3 (Three) Times a Day. 21 tablet 0   • vitamin D (ERGOCALCIFEROL) 1.25 MG (49172 UT) capsule capsule Take 50,000 Units by mouth 1 (One) Time Per Week.       No current facility-administered medications on file prior to visit.          When did you start taking these medications: N/A    Which medication are you concerned about: DONEPEZIL    Who prescribed you this medication: BENJAMIN LOVE    What are your concerns: PATIENT IS  URINATING A LOT AND UNABLE TO CONTROL IT, PATIENT HAS STOPPED TAKING MEDICATION.    How long have you had these concerns: N/A

## 2022-11-28 NOTE — TELEPHONE ENCOUNTER
Spoke with Satnam, patient stopped taking Donepezil a couple of days ago. She will call back to let us know if there is any change

## 2023-02-20 ENCOUNTER — TELEPHONE (OUTPATIENT)
Dept: NEUROLOGY | Facility: CLINIC | Age: 83
End: 2023-02-20

## 2023-02-20 NOTE — TELEPHONE ENCOUNTER
Caller: KaliSatnam mckeon    Relationship: Emergency Contact; [employer]    Best call back number: [phone]     Preferred pharmacy: [facility] [id number] [facility] [address] AT [facility] [phone] [facility] [phone] FX    What was the call regarding: PT'S WIFE CALLED STATING PT FEELS HE IS HAVING WORSENING MEMORY CONCERNS SINCE STOPPING THE DONEPEZIL 10MG PRESCRIPTION BACK IN November 2022.    PT'S WIFE IS CALLING TO ASK IF [facility] WOULD FEEL COMFORTABLE STARTING PT BACK ON THE DONEPEZIL MEDICATION AT ONLY 5MG DAILY INSTEAD OF 10MG. IF SO, PT'S WIFE STATES AN UPDATED RX WOULD NEED TO BE SENT TO PREFERRED PHARMACY (CONFIRMED ABOVE).    Do you require a callback: IF NEEDED.    PLEASE REVIEW AND ADVISE.

## 2023-02-21 RX ORDER — DONEPEZIL HYDROCHLORIDE 5 MG/1
5 TABLET, FILM COATED ORAL NIGHTLY
Qty: 30 TABLET | Refills: 2 | Status: SHIPPED | OUTPATIENT
Start: 2023-02-21 | End: 2024-02-21

## 2023-04-07 NOTE — PROGRESS NOTES
CC:F/U h/o stroke, neuropathy, and MCI    HPI:  Nolan Mccoy Jr. is a  82 y.o.  right-handed male with  HTN (no longer requiring medications), HLD, prediabetes, BPH, and chronic low back pain status post previous laminectomy and fusion who is being seen in follow-up today for history of stroke, memory loss following his stroke, and peripheral neuropathy.  He is accompanied by his wife.    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  CTA head/neck showed plaque moderately narrowing the left vertebral artery as well as stenotic right vertebral artery.  2D echo showed EF of 61 to 65%, normal left atrial size, and saline test results were negative.  He was started on DAPT for 90 days then recommended to continue aspirin.  He was also started on Lipitor 80 mg though his wife felt he had cognitive side effects to this so he was switched to Zetia.  There was no atrial fibrillation on 48-hour Holter.     Following his stroke his wife reported issues with memory loss and the patient.  In September 2020 MoCA score was 19 out of 30 and it was 20 out of 30 in April 2021.  He underwent neuropsych testing with [facility] in October 2021 and received a diagnosis of mild cognitive impairment.  In November 2021 MoCA score was improved to 24/30, and was 24 out of 30 May 2022.  We had tried him on Aricept but at 10 mg he developed confusion and has stopped taking this.  He denies any worsening of his memory since our last visit. MoCA stable at 24/30 today.      He has had symptoms of peripheral neuropathy for over 10 years.  He had an L2-S1 laminectomy and fusion by Dr. Luong and Dr. Little in 2019 in hopes that it would improve his symptoms but it did not.His B12 was borderline at 392 in 2019.  He took B vitamins for a while after that.  In September 2020 I checked additional labs for treatable causes of peripheral neuropathy: TSH was 3.1, free T4 was 1.01, heavy metal screen was  normal, sedimentation rate was 3, folate was greater than 20, immunofixation showed no monoclonality/M spike, cryoglobulin was not detected, and RPR was nonreactive.      EMG done in February 2022 showed severe polyneuropathy, superimposed bilateral L5 or S1 radiculopathies cannot be entirely excluded.  There was acute denervation.  Based on this I ordered an MRI lumbar spine without contrast which showed his previous L2-L5 laminectomy with L2-S1 posterior fusion with degenerative changes but no recurrent stenosis.  There was a mild T12 compression fracture noted which was felt to be chronic.  Genetic testing has previously been offered but was declined.     He is currently taking gabapentin 900 mg 3 times daily.  He previously tried Lyrica but had worsening symptoms and went back on gabapentin.  He is starting to develop some neuropathic symptoms in his hands.    No TIA or stroke symptoms since our last visit.  He is currently taking aspirin 81 mg daily and Zetia 10 mg.  He has had side effects on statins previously even when taking with co-Q10.  He had labs completed 3/27/2023:LDL was 99, HDL 34, total cholesterol 173, triglycerides 198, hemoglobin A1c was 5.7%, and CMP was remarkable for creatinine of 1.26.    His daughter had an embolic stroke at age 60.  Family history is also significant for peripheral neuropathy in his father, paternal uncle, and grandfather.  His mother had Alzheimer's.    The above history was reviewed and updated.  Past Medical History:   Diagnosis Date   • Arthritis    • BPH (benign prostatic hyperplasia)    • Cataracts, bilateral    • Fractures    • GERD (gastroesophageal reflux disease)    • History of transfusion    • HL (hearing loss)    • Hypertension    • Injury of back    • Numbness    • Peripheral neuropathy    • Prostatitis    • Right bundle branch block    • Spinal stenosis of lumbar region     NUMBNESS/ TINGLING BILAT FEET, PAIN DOWN LEFT LEG    • Stroke          Past Surgical  History:   Procedure Laterality Date   • BACK SURGERY     • COLONOSCOPY     • FRACTURE SURGERY      LEFT LEG COMPOUND FRACTURE AGE 10   • LUMBAR DISCECTOMY FUSION INSTRUMENTATION N/A 8/12/2019    Procedure: L2-S1 Laminectomy and Fusion with Instrumentation with Dr. Luong and Dr. Little;  Surgeon: Alex Little MD;  Location: [facility];  Service: Neurosurgery   • LUMBAR LAMINECTOMY DISCECTOMY DECOMPRESSION N/A 8/12/2019    Procedure: L2-S1 laminectomy with a fusion by orthopedics;  Surgeon: Adin Luong MD;  Location: [facility];  Service: Neurosurgery   • TONSILLECTOMY      age 7           Current Outpatient Medications:   •  acetaminophen (TYLENOL) 325 MG tablet, Take 2 tablets by mouth Every 6 (Six) Hours As Needed for Mild Pain., Disp: , Rfl:   •  aspirin 81 MG EC tablet, Take 1 tablet by mouth Daily., Disp: , Rfl:   •  B Complex Vitamins (VITAMIN B COMPLEX PO), Take  by mouth., Disp: , Rfl:   •  Ca Phosphate-Cholecalciferol (CALCIUM/VITAMIN D3 GUMMIES PO), Take  by mouth., Disp: , Rfl:   •  calcium carbonate (TUMS) 500 MG chewable tablet, Chew 1 tablet Every 4 (Four) Hours As Needed for Indigestion or Heartburn., Disp: , Rfl:   •  Cyanocobalamin (VITAMIN B12 PO), Take  by mouth., Disp: , Rfl:   •  dutasteride (AVODART) 0.5 MG capsule, Take 1 capsule by mouth Daily., Disp: , Rfl:   •  gabapentin (NEURONTIN) 300 MG capsule, Take 3 capsules by mouth 3 (Three) Times a Day., Disp: 300 capsule, Rfl: 5  •  Multiple Vitamins-Minerals (MULTIVITAMIN WITH MINERALS) tablet, Take 1 tablet by mouth Daily., Disp: , Rfl:   •  tamsulosin (FLOMAX) 0.4 MG capsule 24 hr capsule, Take 1 capsule by mouth Daily., Disp: , Rfl:   •  valACYclovir (VALTREX) 1000 MG tablet, Take 1 tablet by mouth 3 (Three) Times a Day., Disp: 21 tablet, Rfl: 0  •  vitamin D (ERGOCALCIFEROL) 1.25 MG (96359 UT) capsule capsule, Take 1 capsule by mouth 1 (One) Time Per Week., Disp: , Rfl:   •  coenzyme Q10 100 MG capsule, Take 200 mg by mouth  "Daily. (Patient not taking: Reported on 4/11/2023), Disp: , Rfl:   •  ezetimibe (ZETIA) 10 MG tablet, Take 10 mg by mouth Daily., Disp: , Rfl:   •  lisinopril (PRINIVIL,ZESTRIL) 5 MG tablet, Take 2.5 mg by mouth Daily. (Patient not taking: Reported on 4/11/2023), Disp: , Rfl:   •  lisinopril (PRINIVIL,ZESTRIL) 5 MG tablet, Take 5 mg by mouth Daily. (Patient not taking: Reported on 4/11/2023), Disp: , Rfl:       Family History   Problem Relation Age of Onset   • Hypertension Father    • Neuropathy Father    • Diabetes Sister    • Alzheimer's disease Mother    • Cancer Son    • Malig Hyperthermia Neg Hx          Social History     Socioeconomic History   • Marital status:    Tobacco Use   • Smoking status: Never   • Smokeless tobacco: Never   Substance and Sexual Activity   • Alcohol use: Yes     Alcohol/week: 4.0 standard drinks     Types: 2 Glasses of wine, 2 Cans of beer per week     Comment: 1 drink every other day occasionally   • Drug use: Never     Comment: CBD Oil   • Sexual activity: Defer         Allergies   Allergen Reactions   • Sulfa Antibiotics Rash   • Penicillins Other (See Comments)     Tolerated cefazolin August 2019  CAUSED RASH BACK OF THROAT   • Sulfa Antibiotics Hives             Physical Exam:  Vitals:    04/11/23 1129   BP: 110/70   Pulse: 55   SpO2: 92%   Weight: 93 kg (205 lb)   Height: 180.3 cm (71\")     Orthostatic BP:    Body mass index is 28.59 kg/m².    General appearance: Well developed, well nourished, well groomed, alert and cooperative.   HEENT: Normocephalic.   Cardiac: Regular rate and rhythm. No murmurs.   Chest Exam: Clear to auscultation bilaterally, no wheezes, no rhonchi.  Extremities: Normal, no edema.   Skin: No rashes or birthmarks.     Higher integrative function: Oriented 5 out of 6 on the [place].  Intact recent and remote memory, attention span, concentration and language.  He scored 24 out of 30 on the [place], missed one-point for the date, one-point for numbers F " words in 1 minute, and 4 points for delayed recall.  Speech fluent.  CN II: Normal visual fields.   CN III IV VI: Extraocular movements are full without nystagmus. Pupils are equal, round, and reactive to light.   CN V: Normal facial sensation.  CN VII: Facial movements are symmetric, no weakness.   CN VIII: Auditory acuity is decreased.  CN IX & X: Symmetric palatal movement.   CN XI: Sternocleidomastoid and trapezius are normal. No weakness.   CN XII: The tongue is midline. No atrophy or fasciculations.   Motor: Normal muscle strength, bulk, and tone in upper and lower extremities.  Except for mild weakness with dorsiflexion bilaterally.  No fasciculations, rigidity, spasticity or abnormal movements.   Sensation: Decreased to light touch distal to knees bilaterally.  Station and gait:  mildly broad-based, antalgic, uses cane.  Coordination: Finger to nose test showed no dysmetria.       Results:      Lab Results   Component Value Date    GLUCOSE 112 (H) 04/09/2021    BUN 16 04/12/2022    CREATININE 1.09 04/12/2022    EGFRIFNONA 71 04/09/2021    BCR 14.2 04/12/2022    CO2 23 04/12/2022    CALCIUM 9.0 04/12/2022    PROTENTOTREF 6.7 09/22/2020    ALBUMIN 4.1 04/12/2022    LABIL2 1.6 04/12/2022    AST 25 04/12/2022    ALT 18 04/12/2022       Lab Results   Component Value Date    WBC 5.75 03/11/2021    HGB 16.2 03/11/2021    HCT 45.4 03/11/2021    MCV 91.5 03/11/2021     (L) 03/11/2021         .  Lab Results   Component Value Date    RPR Non-Reactive 09/22/2020         Lab Results   Component Value Date    TSH 3.100 09/22/2020         Lab Results   Component Value Date    [id number] 488 04/09/2021         Lab Results   Component Value Date    FOLATE >20.00 09/22/2020         Lab Results   Component Value Date    HGBA1C 5.7 (H) 03/27/2023         Lab Results   Component Value Date    GLUCOSE 112 (H) 04/09/2021    BUN 16 04/12/2022    CREATININE 1.09 04/12/2022    EGFRIFNONA 71 04/09/2021    BCR 14.2 04/12/2022     K 4.7 04/12/2022    CO2 23 04/12/2022    CALCIUM 9.0 04/12/2022    PROTENTOTREF 6.7 09/22/2020    ALBUMIN 4.1 04/12/2022    LABIL2 1.6 04/12/2022    AST 25 04/12/2022    ALT 18 04/12/2022         Lab Results   Component Value Date    WBC 5.75 03/11/2021    HGB 16.2 03/11/2021    HCT 45.4 03/11/2021    MCV 91.5 03/11/2021     (L) 03/11/2021             Assessment/Plan:     Diagnoses and all orders for this visit:    1. History of stroke (Primary)    2. Idiopathic progressive neuropathy  -     gabapentin (NEURONTIN) 300 MG capsule; Take 3 capsules by mouth 3 (Three) Times a Day.  Dispense: 300 capsule; Refill: 5    3. MCI (mild cognitive impairment)       Continue aspirin 81 mg daily   Blood pressure control to <130/80   Goal LDL <70-intolerant to statins, continue Zetia 10 mg daily    Serum glucose < 140   Call 911 for stroke any stroke symptoms   Do not plan on restarting Aricept due to side effects   Gabapentin refilled today   Follow-up in 6 months or sooner if needed      Greater than 30 minutes spent in review of the chart, face-to-face time with the patient, documentation.          Dictated utilizing Dragon dictation.

## 2023-04-11 ENCOUNTER — OFFICE VISIT (OUTPATIENT)
Dept: NEUROLOGY | Facility: CLINIC | Age: 83
End: 2023-04-11
Payer: MEDICARE

## 2023-04-11 VITALS
HEART RATE: 55 BPM | SYSTOLIC BLOOD PRESSURE: 110 MMHG | OXYGEN SATURATION: 92 % | DIASTOLIC BLOOD PRESSURE: 70 MMHG | WEIGHT: 205 LBS | BODY MASS INDEX: 28.7 KG/M2 | HEIGHT: 71 IN

## 2023-04-11 DIAGNOSIS — G31.84 MCI (MILD COGNITIVE IMPAIRMENT): ICD-10-CM

## 2023-04-11 DIAGNOSIS — Z86.73 HISTORY OF STROKE: Primary | ICD-10-CM

## 2023-04-11 DIAGNOSIS — G60.3 IDIOPATHIC PROGRESSIVE NEUROPATHY: ICD-10-CM

## 2023-04-11 PROCEDURE — 3074F SYST BP LT 130 MM HG: CPT | Performed by: NURSE PRACTITIONER

## 2023-04-11 PROCEDURE — 1159F MED LIST DOCD IN RCRD: CPT | Performed by: NURSE PRACTITIONER

## 2023-04-11 PROCEDURE — 99214 OFFICE O/P EST MOD 30 MIN: CPT | Performed by: NURSE PRACTITIONER

## 2023-04-11 PROCEDURE — 1160F RVW MEDS BY RX/DR IN RCRD: CPT | Performed by: NURSE PRACTITIONER

## 2023-04-11 PROCEDURE — 3078F DIAST BP <80 MM HG: CPT | Performed by: NURSE PRACTITIONER

## 2023-04-11 RX ORDER — GABAPENTIN 300 MG/1
900 CAPSULE ORAL 3 TIMES DAILY
Qty: 300 CAPSULE | Refills: 5 | Status: SHIPPED | OUTPATIENT
Start: 2023-04-11

## 2023-04-11 NOTE — LETTER
April 11, 2023       No Recipients    Patient: Nolan Mccoy Jr.   YOB: 1940   Date of Visit: 4/11/2023       Dear Dr. Ruiz Recipients:    Thank you for referring Nolan Mccoy to me for evaluation. Below are the relevant portions of my assessment and plan of care.    If you have questions, please do not hesitate to call me. I look forward to following Nolan along with you.         Sincerely,        CARMEN Yan        CC:   No Recipients    Rekha Munoz APRN  04/11/23 1221  Signed  CC:F/U h/o stroke, neuropathy, and MCI    HPI:  Nolan Mccoy Jr. is a  82 y.o.  right-handed male with  HTN (no longer requiring medications), HLD, prediabetes, BPH, and chronic low back pain status post previous laminectomy and fusion who is being seen in follow-up today for history of stroke, memory loss following his stroke, and peripheral neuropathy.  He is accompanied by his wife.    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  CTA head/neck showed plaque moderately narrowing the left vertebral artery as well as stenotic right vertebral artery.  2D echo showed EF of 61 to 65%, normal left atrial size, and saline test results were negative.  He was started on DAPT for 90 days then recommended to continue aspirin.  He was also started on Lipitor 80 mg though his wife felt he had cognitive side effects to this so he was switched to Zetia.  There was no atrial fibrillation on 48-hour Holter.     Following his stroke his wife reported issues with memory loss and the patient.  In September 2020 MoCA score was 19 out of 30 and it was 20 out of 30 in April 2021.  He underwent neuropsych testing with [facility] in October 2021 and received a diagnosis of mild cognitive impairment.  In November 2021 MoCA score was improved to 24/30, and was 24 out of 30 May 2022.  We had tried him on Aricept but at 10 mg he developed confusion and has stopped taking this.  He  denies any worsening of his memory since our last visit. MoCA stable at 24/30 today.      He has had symptoms of peripheral neuropathy for over 10 years.  He had an L2-S1 laminectomy and fusion by Dr. Luong and Dr. Little in 2019 in hopes that it would improve his symptoms but it did not.His B12 was borderline at 392 in 2019.  He took B vitamins for a while after that.  In September 2020 I checked additional labs for treatable causes of peripheral neuropathy: TSH was 3.1, free T4 was 1.01, heavy metal screen was normal, sedimentation rate was 3, folate was greater than 20, immunofixation showed no monoclonality/M spike, cryoglobulin was not detected, and RPR was nonreactive.      EMG done in February 2022 showed severe polyneuropathy, superimposed bilateral L5 or S1 radiculopathies cannot be entirely excluded.  There was acute denervation.  Based on this I ordered an MRI lumbar spine without contrast which showed his previous L2-L5 laminectomy with L2-S1 posterior fusion with degenerative changes but no recurrent stenosis.  There was a mild T12 compression fracture noted which was felt to be chronic.  Genetic testing has previously been offered but was declined.     He is currently taking gabapentin 900 mg 3 times daily.  He previously tried Lyrica but had worsening symptoms and went back on gabapentin.  He is starting to develop some neuropathic symptoms in his hands.    No TIA or stroke symptoms since our last visit.  He is currently taking aspirin 81 mg daily and Zetia 10 mg.  He has had side effects on statins previously even when taking with co-Q10.  He had labs completed 3/27/2023:LDL was 99, HDL 34, total cholesterol 173, triglycerides 198, hemoglobin A1c was 5.7%, and CMP was remarkable for creatinine of 1.26.    His daughter had an embolic stroke at age 60.  Family history is also significant for peripheral neuropathy in his father, paternal uncle, and grandfather.  His mother had Alzheimer's.    The above  history was reviewed and updated.  Past Medical History:   Diagnosis Date   • Arthritis    • BPH (benign prostatic hyperplasia)    • Cataracts, bilateral    • Fractures    • GERD (gastroesophageal reflux disease)    • History of transfusion    • HL (hearing loss)    • Hypertension    • Injury of back    • Numbness    • Peripheral neuropathy    • Prostatitis    • Right bundle branch block    • Spinal stenosis of lumbar region     NUMBNESS/ TINGLING BILAT FEET, PAIN DOWN LEFT LEG    • Stroke          Past Surgical History:   Procedure Laterality Date   • BACK SURGERY     • COLONOSCOPY     • FRACTURE SURGERY      LEFT LEG COMPOUND FRACTURE AGE 10   • LUMBAR DISCECTOMY FUSION INSTRUMENTATION N/A 8/12/2019    Procedure: L2-S1 Laminectomy and Fusion with Instrumentation with Dr. Luong and Dr. Little;  Surgeon: Alex Little MD;  Location: [facility];  Service: Neurosurgery   • LUMBAR LAMINECTOMY DISCECTOMY DECOMPRESSION N/A 8/12/2019    Procedure: L2-S1 laminectomy with a fusion by orthopedics;  Surgeon: Adin Luong MD;  Location: [facility];  Service: Neurosurgery   • TONSILLECTOMY      age 7           Current Outpatient Medications:   •  acetaminophen (TYLENOL) 325 MG tablet, Take 2 tablets by mouth Every 6 (Six) Hours As Needed for Mild Pain., Disp: , Rfl:   •  aspirin 81 MG EC tablet, Take 1 tablet by mouth Daily., Disp: , Rfl:   •  B Complex Vitamins (VITAMIN B COMPLEX PO), Take  by mouth., Disp: , Rfl:   •  Ca Phosphate-Cholecalciferol (CALCIUM/VITAMIN D3 GUMMIES PO), Take  by mouth., Disp: , Rfl:   •  calcium carbonate (TUMS) 500 MG chewable tablet, Chew 1 tablet Every 4 (Four) Hours As Needed for Indigestion or Heartburn., Disp: , Rfl:   •  Cyanocobalamin (VITAMIN B12 PO), Take  by mouth., Disp: , Rfl:   •  dutasteride (AVODART) 0.5 MG capsule, Take 1 capsule by mouth Daily., Disp: , Rfl:   •  gabapentin (NEURONTIN) 300 MG capsule, Take 3 capsules by mouth 3 (Three) Times a Day., Disp: 300  "capsule, Rfl: 5  •  Multiple Vitamins-Minerals (MULTIVITAMIN WITH MINERALS) tablet, Take 1 tablet by mouth Daily., Disp: , Rfl:   •  tamsulosin (FLOMAX) 0.4 MG capsule 24 hr capsule, Take 1 capsule by mouth Daily., Disp: , Rfl:   •  valACYclovir (VALTREX) 1000 MG tablet, Take 1 tablet by mouth 3 (Three) Times a Day., Disp: 21 tablet, Rfl: 0  •  vitamin D (ERGOCALCIFEROL) 1.25 MG (25455 UT) capsule capsule, Take 1 capsule by mouth 1 (One) Time Per Week., Disp: , Rfl:   •  coenzyme Q10 100 MG capsule, Take 200 mg by mouth Daily. (Patient not taking: Reported on 4/11/2023), Disp: , Rfl:   •  ezetimibe (ZETIA) 10 MG tablet, Take 10 mg by mouth Daily., Disp: , Rfl:   •  lisinopril (PRINIVIL,ZESTRIL) 5 MG tablet, Take 2.5 mg by mouth Daily. (Patient not taking: Reported on 4/11/2023), Disp: , Rfl:   •  lisinopril (PRINIVIL,ZESTRIL) 5 MG tablet, Take 5 mg by mouth Daily. (Patient not taking: Reported on 4/11/2023), Disp: , Rfl:       Family History   Problem Relation Age of Onset   • Hypertension Father    • Neuropathy Father    • Diabetes Sister    • Alzheimer's disease Mother    • Cancer Son    • Malig Hyperthermia Neg Hx          Social History     Socioeconomic History   • Marital status:    Tobacco Use   • Smoking status: Never   • Smokeless tobacco: Never   Substance and Sexual Activity   • Alcohol use: Yes     Alcohol/week: 4.0 standard drinks     Types: 2 Glasses of wine, 2 Cans of beer per week     Comment: 1 drink every other day occasionally   • Drug use: Never     Comment: CBD Oil   • Sexual activity: Defer         Allergies   Allergen Reactions   • Sulfa Antibiotics Rash   • Penicillins Other (See Comments)     Tolerated cefazolin August 2019  CAUSED RASH BACK OF THROAT   • Sulfa Antibiotics Hives             Physical Exam:  Vitals:    04/11/23 1129   BP: 110/70   Pulse: 55   SpO2: 92%   Weight: 93 kg (205 lb)   Height: 180.3 cm (71\")     Orthostatic BP:    Body mass index is 28.59 kg/m².    General " appearance: Well developed, well nourished, well groomed, alert and cooperative.   HEENT: Normocephalic.   Cardiac: Regular rate and rhythm. No murmurs.   Chest Exam: Clear to auscultation bilaterally, no wheezes, no rhonchi.  Extremities: Normal, no edema.   Skin: No rashes or birthmarks.     Higher integrative function: Oriented 5 out of 6 on the [place].  Intact recent and remote memory, attention span, concentration and language.  He scored 24 out of 30 on the [place], missed one-point for the date, one-point for numbers F words in 1 minute, and 4 points for delayed recall.  Speech fluent.  CN II: Normal visual fields.   CN III IV VI: Extraocular movements are full without nystagmus. Pupils are equal, round, and reactive to light.   CN V: Normal facial sensation.  CN VII: Facial movements are symmetric, no weakness.   CN VIII: Auditory acuity is decreased.  CN IX & X: Symmetric palatal movement.   CN XI: Sternocleidomastoid and trapezius are normal. No weakness.   CN XII: The tongue is midline. No atrophy or fasciculations.   Motor: Normal muscle strength, bulk, and tone in upper and lower extremities.  Except for mild weakness with dorsiflexion bilaterally.  No fasciculations, rigidity, spasticity or abnormal movements.   Sensation: Decreased to light touch distal to knees bilaterally.  Station and gait:  mildly broad-based, antalgic, uses cane.  Coordination: Finger to nose test showed no dysmetria.       Results:      Lab Results   Component Value Date    GLUCOSE 112 (H) 04/09/2021    BUN 16 04/12/2022    CREATININE 1.09 04/12/2022    EGFRIFNONA 71 04/09/2021    BCR 14.2 04/12/2022    CO2 23 04/12/2022    CALCIUM 9.0 04/12/2022    PROTENTOTREF 6.7 09/22/2020    ALBUMIN 4.1 04/12/2022    LABIL2 1.6 04/12/2022    AST 25 04/12/2022    ALT 18 04/12/2022       Lab Results   Component Value Date    WBC 5.75 03/11/2021    HGB 16.2 03/11/2021    HCT 45.4 03/11/2021    MCV 91.5 03/11/2021     (L) 03/11/2021          .  Lab Results   Component Value Date    RPR Non-Reactive 09/22/2020         Lab Results   Component Value Date    TSH 3.100 09/22/2020         Lab Results   Component Value Date    [id number] 488 04/09/2021         Lab Results   Component Value Date    FOLATE >20.00 09/22/2020         Lab Results   Component Value Date    HGBA1C 5.7 (H) 03/27/2023         Lab Results   Component Value Date    GLUCOSE 112 (H) 04/09/2021    BUN 16 04/12/2022    CREATININE 1.09 04/12/2022    EGFRIFNONA 71 04/09/2021    BCR 14.2 04/12/2022    K 4.7 04/12/2022    CO2 23 04/12/2022    CALCIUM 9.0 04/12/2022    PROTENTOTREF 6.7 09/22/2020    ALBUMIN 4.1 04/12/2022    LABIL2 1.6 04/12/2022    AST 25 04/12/2022    ALT 18 04/12/2022         Lab Results   Component Value Date    WBC 5.75 03/11/2021    HGB 16.2 03/11/2021    HCT 45.4 03/11/2021    MCV 91.5 03/11/2021     (L) 03/11/2021             Assessment/Plan:     Diagnoses and all orders for this visit:    1. History of stroke (Primary)    2. Idiopathic progressive neuropathy  -     gabapentin (NEURONTIN) 300 MG capsule; Take 3 capsules by mouth 3 (Three) Times a Day.  Dispense: 300 capsule; Refill: 5    3. MCI (mild cognitive impairment)       Continue aspirin 81 mg daily   Blood pressure control to <130/80   Goal LDL <70-intolerant to statins, continue Zetia 10 mg daily    Serum glucose < 140   Call 911 for stroke any stroke symptoms   Do not plan on restarting Aricept due to side effects   Gabapentin refilled today   Follow-up in 6 months or sooner if needed      Greater than 30 minutes spent in review of the chart, face-to-face time with the patient, documentation.          Dictated utilizing Dragon dictation.

## 2023-04-11 NOTE — PATIENT INSTRUCTIONS
Fall Prevention in the Home, Adult  Falls can cause injuries and affect people of all ages. There are many simple things that you can do to make your home safe and to help prevent falls. Ask for help when making these changes, if needed.  What actions can I take to prevent falls?  General instructions  • Use good lighting in all rooms. Replace any light bulbs that burn out, turn on lights if it is dark, and use night-lights.  • Place frequently used items in easy-to-reach places. Lower the shelves around your home if necessary.  • Set up furniture so that there are clear paths around it. Avoid moving your furniture around.  • Remove throw rugs and other tripping hazards from the floor.  • Avoid walking on wet floors.  • Fix any uneven floor surfaces.  • Add color or contrast paint or tape to grab bars and handrails in your home. Place contrasting color strips on the first and last steps of staircases.  • When you use a stepladder, make sure that it is completely opened and that the sides and supports are firmly locked. Have someone hold the ladder while you are using it. Do not climb a closed stepladder.  • Know where your pets are when moving through your home.  What can I do in the bathroom?     • Keep the floor dry. Immediately clean up any water that is on the floor.  • Remove soap buildup in the tub or shower regularly.  • Use nonskid mats or decals on the floor of the tub or shower.  • Attach bath mats securely with double-sided, nonslip rug tape.  • If you need to sit down while you are in the shower, use a plastic, nonslip stool.  • Install grab bars by the toilet and in the tub and shower. Do not use towel bars as grab bars.  What can I do in the bedroom?  • Make sure that a bedside light is easy to reach.  • Do not use oversized bedding that reaches the floor.  • Have a firm chair that has side arms to use for getting dressed.  What can I do in the kitchen?  • Clean up any spills right away.  • If you  need to reach for something above you, use a sturdy step stool that has a grab bar.  • Keep electrical cables out of the way.  • Do not use floor polish or wax that makes floors slippery. If you must use wax, make sure that it is non-skid floor wax.  What can I do with my stairs?  • Do not leave any items on the stairs.  • Make sure that you have a light switch at the top and the bottom of the stairs. Have them installed if you do not have them.  • Make sure that there are handrails on both sides of the stairs. Fix handrails that are broken or loose. Make sure that handrails are as long as the staircases.  • Install non-slip stair treads on all stairs in your home.  • Avoid having throw rugs at the top or bottom of stairs, or secure the rugs with carpet tape to prevent them from moving.  • Choose a carpet design that does not hide the edge of steps on the stairs.  • Check any carpeting to make sure that it is firmly attached to the stairs. Fix any carpet that is loose or worn.  What can I do on the outside of my home?  • Use bright outdoor lighting.  • Regularly repair the edges of walkways and driveways and fix any cracks.  • Remove high doorway thresholds.  • Trim any shrubbery on the main path into your home.  • Regularly check that handrails are securely fastened and in good repair. Both sides of all steps should have handrails.  • Install guardrails along the edges of any raised decks or porches.  • Clear walkways of debris and clutter, including tools and rocks.  • Have leaves, snow, and ice cleared regularly.  • Use sand or salt on walkways during winter months.  • In the garage, clean up any spills right away, including grease or oil spills.  What other actions can I take?  • Wear closed-toe shoes that fit well and support your feet. Wear shoes that have rubber soles or low heels.  • Use mobility aids as needed, such as canes, walkers, scooters, and crutches.  • Review your medicines with your health care  provider. Some medicines can cause dizziness or changes in blood pressure, which increase your risk of falling.  Talk with your health care provider about other ways that you can decrease your risk of falls. This may include working with a physical therapist or  to improve your strength, balance, and endurance.  Where to find more information  • Centers for Disease Control and Prevention, [place]: www.cdc.gov  • [employer] [place] [employer]: www.nicolette.nih.gov  Contact a health care provider if:  • You are afraid of falling at home.  • You feel weak, drowsy, or dizzy at home.  • You fall at home.  Summary  • There are many simple things that you can do to make your home safe and to help prevent falls.  • Ways to make your home safe include removing tripping hazards and installing grab bars in the bathroom.  • Ask for help when making these changes in your home.  This information is not intended to replace advice given to you by your health care provider. Make sure you discuss any questions you have with your health care provider.  Document Revised: 09/19/2022 Document Reviewed: 07/21/2021  Elsevier Patient Education © 2022 [employer]

## 2023-10-20 NOTE — PROGRESS NOTES
CC:Follow-up stroke, memory loss, neuropathy    HPI:  Nolan Mccoy Jr. is a  82 y.o.  right-handed male ith  HTN (no longer requiring medications), HLD, prediabetes, BPH, and chronic low back pain status post previous laminectomy and fusion who is being seen about today for stroke, memory loss following his stroke, and peripheral neuropathy.  He is accompanied by his wife.    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  CTA head/neck showed plaque moderately narrowing the left vertebral artery as well as stenotic right vertebral artery.  2D echo showed EF of 61 to 65%, normal left atrial size, and saline test results were negative.  He was started on DAPT for 90 days then recommended to continue aspirin.  He was also started on Lipitor 80 mg though his wife felt he had cognitive side effects to this so he was switched to Zetia. There was no atrial fibrillation on 48-hour Holter.     Following his stroke his wife reported issues with memory loss and the patient.  In September 2020 MoCA score was 19 out of 30 and it was 20 out of 30 in April 2021.  He underwent neuropsych testing with [facility] in October 2021 and received a diagnosis of mild cognitive impairment.  In November 2021 MoCA score was improved to 24/30, and was 24 out of 30 May 2022.  We had tried him on Aricept but at 10 mg he developed confusion and has stopped taking this.  Most recent MoCA score was 24 out of 30 in April 2023.     He has had symptoms of peripheral neuropathy for over 10 years.  He had an L2-S1 laminectomy and fusion by Dr. Luong and Dr. Little in 2019 in hopes that it would improve his symptoms but it did not.His B12 was borderline at 392 in 2019.  He took B vitamins for a while after that.  In September 2020 I checked additional labs for treatable causes of peripheral neuropathy: TSH was 3.1, free T4 was 1.01, heavy metal screen was normal, sedimentation rate was 3, folate was greater  than 20, immunofixation showed no monoclonality/M spike, cryoglobulin was not detected, and RPR was nonreactive.  He has been maintained on gabapentin.  Previously tried Lyrica but had worsening symptoms so was switched back to gabapentin.     EMG done in February 2022 showed severe polyneuropathy, superimposed bilateral L5 or S1 radiculopathies cannot be entirely excluded.  There was acute denervation.  Based on this I ordered an MRI lumbar spine without contrast which showed his previous L2-L5 laminectomy with L2-S1 posterior fusion with degenerative changes but no recurrent stenosis.  There was a mild T12 compression fracture noted which was felt to be chronic.  Genetic testing has previously been offered but was declined.     No TIA or stroke symptoms since his last visit.  Continues to take aspirin 81 mg daily and Zetia 10 mg.  No changes in memory.  Continues to endorse significant leg pain and feels neuropathy is worsening in his legs.  He is also reporting some minor symptoms, numbness, developing in his hands.  Currently taking gabapentin 900 mg 3 times daily.  He is inquiring on what else can be done to help his pain.  He has previously seen pain management, Dr. Favio Ferrell but does not currently follow with him.    He is complaining of pain intermittently in the second toe on his right foot.  He states his PCP was told neurology should make a referral to podiatry.    His daughter had an embolic stroke at age 60.  Family history is also significant for peripheral neuropathy in his father, paternal uncle, and grandfather.  His mother had Alzheimer's.     The above history was reviewed/updated  Past Medical History:   Diagnosis Date    Arthritis     BPH (benign prostatic hyperplasia)     Cataracts, bilateral     Fractures     GERD (gastroesophageal reflux disease)     History of transfusion     HL (hearing loss)     Hypertension     Injury of back     Numbness     Peripheral neuropathy     Prostatitis      Right bundle branch block     Spinal stenosis of lumbar region     NUMBNESS/ TINGLING BILAT FEET, PAIN DOWN LEFT LEG     Stroke          Past Surgical History:   Procedure Laterality Date    BACK SURGERY      COLONOSCOPY      FRACTURE SURGERY      LEFT LEG COMPOUND FRACTURE AGE 10    LUMBAR DISCECTOMY FUSION INSTRUMENTATION N/A 8/12/2019    Procedure: L2-S1 Laminectomy and Fusion with Instrumentation with Dr. Luong and Dr. Little;  Surgeon: Alex Little MD;  Location: [facility];  Service: Neurosurgery    LUMBAR LAMINECTOMY DISCECTOMY DECOMPRESSION N/A 8/12/2019    Procedure: L2-S1 laminectomy with a fusion by orthopedics;  Surgeon: Adin Luong MD;  Location: [facility];  Service: Neurosurgery    TONSILLECTOMY      age 7           Current Outpatient Medications:     acetaminophen (TYLENOL) 325 MG tablet, Take 2 tablets by mouth Every 6 (Six) Hours As Needed for Mild Pain., Disp: , Rfl:     aspirin 81 MG EC tablet, Take 1 tablet by mouth Daily., Disp: , Rfl:     B Complex Vitamins (VITAMIN B COMPLEX PO), Take  by mouth., Disp: , Rfl:     Ca Phosphate-Cholecalciferol (CALCIUM/VITAMIN D3 GUMMIES PO), Take  by mouth., Disp: , Rfl:     calcium carbonate (TUMS) 500 MG chewable tablet, Chew 1 tablet Every 4 (Four) Hours As Needed for Indigestion or Heartburn., Disp: , Rfl:     dutasteride (AVODART) 0.5 MG capsule, Take 1 capsule by mouth Daily., Disp: , Rfl:     gabapentin (NEURONTIN) 300 MG capsule, Take 3 capsules by mouth 3 (Three) Times a Day., Disp: 300 capsule, Rfl: 5    Multiple Vitamins-Minerals (MULTIVITAMIN WITH MINERALS) tablet, Take 1 tablet by mouth Daily., Disp: , Rfl:     tamsulosin (FLOMAX) 0.4 MG capsule 24 hr capsule, Take 1 capsule by mouth Daily., Disp: , Rfl:     valACYclovir (VALTREX) 1000 MG tablet, Take 1 tablet by mouth 3 (Three) Times a Day., Disp: 21 tablet, Rfl: 0    vitamin D (ERGOCALCIFEROL) 1.25 MG (34951 UT) capsule capsule, Take 2,000 Units by mouth 1 (One) Time Per Week.,  "Disp: , Rfl:     coenzyme Q10 100 MG capsule, Take 200 mg by mouth Daily. (Patient not taking: Reported on 4/11/2023), Disp: , Rfl:     Cyanocobalamin (VITAMIN B12 PO), Take  by mouth. (Patient not taking: Reported on 10/25/2023), Disp: , Rfl:     ezetimibe (ZETIA) 10 MG tablet, Take 1 tablet by mouth Daily., Disp: , Rfl:     lisinopril (PRINIVIL,ZESTRIL) 5 MG tablet, Take 2.5 mg by mouth Daily. (Patient not taking: Reported on 4/11/2023), Disp: , Rfl:     lisinopril (PRINIVIL,ZESTRIL) 5 MG tablet, Take 5 mg by mouth Daily. (Patient not taking: Reported on 4/11/2023), Disp: , Rfl:       Family History   Problem Relation Age of Onset    Hypertension Father     Neuropathy Father     Diabetes Sister     Alzheimer's disease Mother     Cancer Son     Malig Hyperthermia Neg Hx          Social History     Socioeconomic History    Marital status:    Tobacco Use    Smoking status: Never    Smokeless tobacco: Never   Substance and Sexual Activity    Alcohol use: Yes     Alcohol/week: 4.0 standard drinks of alcohol     Types: 2 Glasses of wine, 2 Cans of beer per week     Comment: 1 drink every other day occasionally    Drug use: Never     Comment: CBD Oil    Sexual activity: Defer         Allergies   Allergen Reactions    Sulfa Antibiotics Rash    Penicillins Other (See Comments)     Tolerated cefazolin August 2019  CAUSED RASH BACK OF THROAT    Sulfa Antibiotics Hives             Physical Exam:  Vitals:    10/25/23 1134   BP: 122/76   Pulse: 73   SpO2: 97%   Weight: 88.5 kg (195 lb)   Height: 180.3 cm (71\")     Orthostatic BP:    Body mass index is 27.2 kg/m².    General appearance: Well developed, well nourished, well groomed, alert and cooperative.   HEENT: Normocephalic.   Cardiac: Regular rate and rhythm. No murmurs.   Peripheral Vasculature: Radial pulses are equal and symmetric.  Chest Exam: Clear to auscultation bilaterally, no wheezes, no rhonchi.  Extremities: Normal, no edema.   Skin: No rashes or " birthmarks.     Higher integrative function: Oriented x3.  Intact recent and remote memory, attention span, concentration and language. Spontaneous speech, fund of vocabulary are normal.   CN II: Normal visual fields.   CN III IV VI: Extraocular movements are full without nystagmus. Pupils are equal, round, and reactive to light.   CN V: Normal facial sensation.  CN VII: Facial movements are symmetric, no weakness.   CN VIII: Auditory acuity is absent to finger rub on the right.  CN IX & X: Symmetric palatal movement.   CN XI: Sternocleidomastoid and trapezius are normal. No weakness.   CN XII: The tongue is midline.   Motor: Normal muscle strength, bulk, and tone in upper and lower extremities except mild weakness noticed in dorsiflexion bilaterally. No fasciculations, rigidity, spasticity or abnormal movements.   Sensation: Decreased to light touch in lower extremities bilaterally distal to knees.  Station and gait: Antalgic gait, mildly broad-based, uses cane.  Coordination: Finger to nose test showed no dysmetria.      Results:      Lab Results   Component Value Date    GLUCOSE 112 (H) 04/09/2021    BUN 16 04/12/2022    CREATININE 1.09 04/12/2022    EGFRIFNONA 71 04/09/2021    BCR 14.2 04/12/2022    CO2 23 04/12/2022    CALCIUM 9.0 04/12/2022    PROTENTOTREF 6.7 09/22/2020    ALBUMIN 4.1 04/12/2022    LABIL2 1.6 04/12/2022    AST 25 04/12/2022    ALT 18 04/12/2022       Lab Results   Component Value Date    WBC 5.75 03/11/2021    HGB 16.2 03/11/2021    HCT 45.4 03/11/2021    MCV 91.5 03/11/2021     (L) 03/11/2021         .  Lab Results   Component Value Date    RPR Non-Reactive 09/22/2020         Lab Results   Component Value Date    TSH 3.100 09/22/2020         Lab Results   Component Value Date    [id number] 488 04/09/2021         Lab Results   Component Value Date    FOLATE >20.00 09/22/2020         Lab Results   Component Value Date    HGBA1C 5.8 (H) 10/04/2023         Lab Results   Component Value  Date    GLUCOSE 112 (H) 04/09/2021    BUN 16 04/12/2022    CREATININE 1.09 04/12/2022    EGFRIFNONA 71 04/09/2021    BCR 14.2 04/12/2022    K 4.7 04/12/2022    CO2 23 04/12/2022    CALCIUM 9.0 04/12/2022    PROTENTOTREF 6.7 09/22/2020    ALBUMIN 4.1 04/12/2022    LABIL2 1.6 04/12/2022    AST 25 04/12/2022    ALT 18 04/12/2022         Lab Results   Component Value Date    WBC 5.75 03/11/2021    HGB 16.2 03/11/2021    HCT 45.4 03/11/2021    MCV 91.5 03/11/2021     (L) 03/11/2021             Assessment/Plan:        Diagnoses and all orders for this visit:    1. Toe pain, right (Primary)  -     Ambulatory Referral to Pediatric Podiatry    2. Spinal stenosis of lumbar region with neurogenic claudication  -     Ambulatory Referral to Pain Management    3. Neuropathy  -     Ambulatory Referral to Pediatric Podiatry  -     Ambulatory Referral to Pain Management       For secondary stroke prevention:   Continue aspirin 81 mg daily  Blood pressure control to <130/80   Goal LDL <70-recommend high dose statins, did not tolerate, continue Zetia    Serum glucose < 140   Call 911 for stroke any stroke symptoms    For worsening neuropathy symptoms we will refer him back to pain management and see if they have anything additional to offer.  Continue gabapentin 900 mg twice daily, will refill when due.    Referral placed to podiatry for complaints of intermittent right second toe pain.     Follow-up in 6 months or sooner if needed    Total time: 40 minutes            Dictated utilizing Dragon dictation.

## 2023-10-25 ENCOUNTER — OFFICE VISIT (OUTPATIENT)
Dept: NEUROLOGY | Facility: CLINIC | Age: 83
End: 2023-10-25
Payer: MEDICARE

## 2023-10-25 VITALS
HEART RATE: 73 BPM | WEIGHT: 195 LBS | OXYGEN SATURATION: 97 % | SYSTOLIC BLOOD PRESSURE: 122 MMHG | DIASTOLIC BLOOD PRESSURE: 76 MMHG | BODY MASS INDEX: 27.3 KG/M2 | HEIGHT: 71 IN

## 2023-10-25 DIAGNOSIS — M79.674 TOE PAIN, RIGHT: Primary | ICD-10-CM

## 2023-10-25 DIAGNOSIS — M48.062 SPINAL STENOSIS OF LUMBAR REGION WITH NEUROGENIC CLAUDICATION: ICD-10-CM

## 2023-10-25 DIAGNOSIS — G62.9 NEUROPATHY: ICD-10-CM

## 2023-10-25 NOTE — LETTER
October 25, 2023       No Recipients    Patient: Nolan Mccoy Jr.   YOB: 1940   Date of Visit: 10/25/2023     Dear George Escobar MD:       Thank you for referring Nolan Mccoy to me for evaluation. Below are the relevant portions of my assessment and plan of care.    If you have questions, please do not hesitate to call me. I look forward to following Nolan along with you.         Sincerely,        CARMEN Yan        CC:   No Recipients    Rekha Munoz APRN  10/25/23 1210  Sign when Signing Visit  CC:Follow-up stroke, memory loss, neuropathy    HPI:  Nolan Mccoy Jr. is a  82 y.o.  right-handed male ith  HTN (no longer requiring medications), HLD, prediabetes, BPH, and chronic low back pain status post previous laminectomy and fusion who is being seen about today for stroke, memory loss following his stroke, and peripheral neuropathy.  He is accompanied by his wife.    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  CTA head/neck showed plaque moderately narrowing the left vertebral artery as well as stenotic right vertebral artery.  2D echo showed EF of 61 to 65%, normal left atrial size, and saline test results were negative.  He was started on DAPT for 90 days then recommended to continue aspirin.  He was also started on Lipitor 80 mg though his wife felt he had cognitive side effects to this so he was switched to Zetia. There was no atrial fibrillation on 48-hour Holter.     Following his stroke his wife reported issues with memory loss and the patient.  In September 2020 MoCA score was 19 out of 30 and it was 20 out of 30 in April 2021.  He underwent neuropsych testing with [facility] in October 2021 and received a diagnosis of mild cognitive impairment.  In November 2021 MoCA score was improved to 24/30, and was 24 out of 30 May 2022.  We had tried him on Aricept but at 10 mg he developed confusion and has stopped  taking this.  Most recent MoCA score was 24 out of 30 in April 2023.     He has had symptoms of peripheral neuropathy for over 10 years.  He had an L2-S1 laminectomy and fusion by Dr. Luong and Dr. Little in 2019 in hopes that it would improve his symptoms but it did not.His B12 was borderline at 392 in 2019.  He took B vitamins for a while after that.  In September 2020 I checked additional labs for treatable causes of peripheral neuropathy: TSH was 3.1, free T4 was 1.01, heavy metal screen was normal, sedimentation rate was 3, folate was greater than 20, immunofixation showed no monoclonality/M spike, cryoglobulin was not detected, and RPR was nonreactive.  He has been maintained on gabapentin.  Previously tried Lyrica but had worsening symptoms so was switched back to gabapentin.     EMG done in February 2022 showed severe polyneuropathy, superimposed bilateral L5 or S1 radiculopathies cannot be entirely excluded.  There was acute denervation.  Based on this I ordered an MRI lumbar spine without contrast which showed his previous L2-L5 laminectomy with L2-S1 posterior fusion with degenerative changes but no recurrent stenosis.  There was a mild T12 compression fracture noted which was felt to be chronic.  Genetic testing has previously been offered but was declined.     No TIA or stroke symptoms since his last visit.  Continues to take aspirin 81 mg daily and Zetia 10 mg.  No changes in memory.  Continues to endorse significant leg pain and feels neuropathy is worsening in his legs.  He is also reporting some minor symptoms, numbness, developing in his hands.  Currently taking gabapentin 900 mg 3 times daily.  He is inquiring on what else can be done to help his pain.  He has previously seen pain management, Dr. Favio Ferrell but does not currently follow with him.    He is complaining of pain intermittently in the second toe on his right foot.  He states his PCP was told neurology should make a referral to  podiatry.    His daughter had an embolic stroke at age 60.  Family history is also significant for peripheral neuropathy in his father, paternal uncle, and grandfather.  His mother had Alzheimer's.     The above history was reviewed/updated  Past Medical History:   Diagnosis Date   • Arthritis    • BPH (benign prostatic hyperplasia)    • Cataracts, bilateral    • Fractures    • GERD (gastroesophageal reflux disease)    • History of transfusion    • HL (hearing loss)    • Hypertension    • Injury of back    • Numbness    • Peripheral neuropathy    • Prostatitis    • Right bundle branch block    • Spinal stenosis of lumbar region     NUMBNESS/ TINGLING BILAT FEET, PAIN DOWN LEFT LEG    • Stroke          Past Surgical History:   Procedure Laterality Date   • BACK SURGERY     • COLONOSCOPY     • FRACTURE SURGERY      LEFT LEG COMPOUND FRACTURE AGE 10   • LUMBAR DISCECTOMY FUSION INSTRUMENTATION N/A 8/12/2019    Procedure: L2-S1 Laminectomy and Fusion with Instrumentation with Dr. Luong and Dr. Little;  Surgeon: Alex Little MD;  Location: [facility];  Service: Neurosurgery   • LUMBAR LAMINECTOMY DISCECTOMY DECOMPRESSION N/A 8/12/2019    Procedure: L2-S1 laminectomy with a fusion by orthopedics;  Surgeon: Adin Luong MD;  Location: [facility];  Service: Neurosurgery   • TONSILLECTOMY      age 7           Current Outpatient Medications:   •  acetaminophen (TYLENOL) 325 MG tablet, Take 2 tablets by mouth Every 6 (Six) Hours As Needed for Mild Pain., Disp: , Rfl:   •  aspirin 81 MG EC tablet, Take 1 tablet by mouth Daily., Disp: , Rfl:   •  B Complex Vitamins (VITAMIN B COMPLEX PO), Take  by mouth., Disp: , Rfl:   •  Ca Phosphate-Cholecalciferol (CALCIUM/VITAMIN D3 GUMMIES PO), Take  by mouth., Disp: , Rfl:   •  calcium carbonate (TUMS) 500 MG chewable tablet, Chew 1 tablet Every 4 (Four) Hours As Needed for Indigestion or Heartburn., Disp: , Rfl:   •  dutasteride (AVODART) 0.5 MG capsule, Take 1 capsule  by mouth Daily., Disp: , Rfl:   •  gabapentin (NEURONTIN) 300 MG capsule, Take 3 capsules by mouth 3 (Three) Times a Day., Disp: 300 capsule, Rfl: 5  •  Multiple Vitamins-Minerals (MULTIVITAMIN WITH MINERALS) tablet, Take 1 tablet by mouth Daily., Disp: , Rfl:   •  tamsulosin (FLOMAX) 0.4 MG capsule 24 hr capsule, Take 1 capsule by mouth Daily., Disp: , Rfl:   •  valACYclovir (VALTREX) 1000 MG tablet, Take 1 tablet by mouth 3 (Three) Times a Day., Disp: 21 tablet, Rfl: 0  •  vitamin D (ERGOCALCIFEROL) 1.25 MG (49967 UT) capsule capsule, Take 2,000 Units by mouth 1 (One) Time Per Week., Disp: , Rfl:   •  coenzyme Q10 100 MG capsule, Take 200 mg by mouth Daily. (Patient not taking: Reported on 4/11/2023), Disp: , Rfl:   •  Cyanocobalamin (VITAMIN B12 PO), Take  by mouth. (Patient not taking: Reported on 10/25/2023), Disp: , Rfl:   •  ezetimibe (ZETIA) 10 MG tablet, Take 1 tablet by mouth Daily., Disp: , Rfl:   •  lisinopril (PRINIVIL,ZESTRIL) 5 MG tablet, Take 2.5 mg by mouth Daily. (Patient not taking: Reported on 4/11/2023), Disp: , Rfl:   •  lisinopril (PRINIVIL,ZESTRIL) 5 MG tablet, Take 5 mg by mouth Daily. (Patient not taking: Reported on 4/11/2023), Disp: , Rfl:       Family History   Problem Relation Age of Onset   • Hypertension Father    • Neuropathy Father    • Diabetes Sister    • Alzheimer's disease Mother    • Cancer Son    • Malig Hyperthermia Neg Hx          Social History     Socioeconomic History   • Marital status:    Tobacco Use   • Smoking status: Never   • Smokeless tobacco: Never   Substance and Sexual Activity   • Alcohol use: Yes     Alcohol/week: 4.0 standard drinks of alcohol     Types: 2 Glasses of wine, 2 Cans of beer per week     Comment: 1 drink every other day occasionally   • Drug use: Never     Comment: CBD Oil   • Sexual activity: Defer         Allergies   Allergen Reactions   • Sulfa Antibiotics Rash   • Penicillins Other (See Comments)     Tolerated cefazolin August  "2019  CAUSED RASH BACK OF THROAT   • Sulfa Antibiotics Hives             Physical Exam:  Vitals:    10/25/23 1134   BP: 122/76   Pulse: 73   SpO2: 97%   Weight: 88.5 kg (195 lb)   Height: 180.3 cm (71\")     Orthostatic BP:    Body mass index is 27.2 kg/m².    General appearance: Well developed, well nourished, well groomed, alert and cooperative.   HEENT: Normocephalic.   Cardiac: Regular rate and rhythm. No murmurs.   Peripheral Vasculature: Radial pulses are equal and symmetric.  Chest Exam: Clear to auscultation bilaterally, no wheezes, no rhonchi.  Extremities: Normal, no edema.   Skin: No rashes or birthmarks.     Higher integrative function: Oriented x3.  Intact recent and remote memory, attention span, concentration and language. Spontaneous speech, fund of vocabulary are normal.   CN II: Normal visual fields.   CN III IV VI: Extraocular movements are full without nystagmus. Pupils are equal, round, and reactive to light.   CN V: Normal facial sensation.  CN VII: Facial movements are symmetric, no weakness.   CN VIII: Auditory acuity is absent to finger rub on the right.  CN IX & X: Symmetric palatal movement.   CN XI: Sternocleidomastoid and trapezius are normal. No weakness.   CN XII: The tongue is midline.   Motor: Normal muscle strength, bulk, and tone in upper and lower extremities except mild weakness noticed in dorsiflexion bilaterally. No fasciculations, rigidity, spasticity or abnormal movements.   Sensation: Decreased to light touch in lower extremities bilaterally distal to knees.  Station and gait: Antalgic gait, mildly broad-based, uses cane.  Coordination: Finger to nose test showed no dysmetria.      Results:      Lab Results   Component Value Date    GLUCOSE 112 (H) 04/09/2021    BUN 16 04/12/2022    CREATININE 1.09 04/12/2022    EGFRIFNONA 71 04/09/2021    BCR 14.2 04/12/2022    CO2 23 04/12/2022    CALCIUM 9.0 04/12/2022    PROTENTOTREF 6.7 09/22/2020    ALBUMIN 4.1 04/12/2022    LABIL2 1.6 " 04/12/2022    AST 25 04/12/2022    ALT 18 04/12/2022       Lab Results   Component Value Date    WBC 5.75 03/11/2021    HGB 16.2 03/11/2021    HCT 45.4 03/11/2021    MCV 91.5 03/11/2021     (L) 03/11/2021         .  Lab Results   Component Value Date    RPR Non-Reactive 09/22/2020         Lab Results   Component Value Date    TSH 3.100 09/22/2020         Lab Results   Component Value Date    NXMFQEBP90 488 04/09/2021         Lab Results   Component Value Date    FOLATE >20.00 09/22/2020         Lab Results   Component Value Date    HGBA1C 5.8 (H) 10/04/2023         Lab Results   Component Value Date    GLUCOSE 112 (H) 04/09/2021    BUN 16 04/12/2022    CREATININE 1.09 04/12/2022    EGFRIFNONA 71 04/09/2021    BCR 14.2 04/12/2022    K 4.7 04/12/2022    CO2 23 04/12/2022    CALCIUM 9.0 04/12/2022    PROTENTOTREF 6.7 09/22/2020    ALBUMIN 4.1 04/12/2022    LABIL2 1.6 04/12/2022    AST 25 04/12/2022    ALT 18 04/12/2022         Lab Results   Component Value Date    WBC 5.75 03/11/2021    HGB 16.2 03/11/2021    HCT 45.4 03/11/2021    MCV 91.5 03/11/2021     (L) 03/11/2021             Assessment/Plan:        Diagnoses and all orders for this visit:    1. Toe pain, right (Primary)  -     Ambulatory Referral to Pediatric Podiatry    2. Spinal stenosis of lumbar region with neurogenic claudication  -     Ambulatory Referral to Pain Management    3. Neuropathy  -     Ambulatory Referral to Pediatric Podiatry  -     Ambulatory Referral to Pain Management       For secondary stroke prevention:   Continue aspirin 81 mg daily  Blood pressure control to <130/80   Goal LDL <70-recommend high dose statins, did not tolerate, continue Zetia    Serum glucose < 140   Call 911 for stroke any stroke symptoms    For worsening neuropathy symptoms we will refer him back to pain management and see if they have anything additional to offer.  Continue gabapentin 900 mg twice daily, will refill when due.    Referral placed to  podiatry for complaints of intermittent right second toe pain.     Follow-up in 6 months or sooner if needed    Total time: 40 minutes            Dictated utilizing Dragon dictation.

## 2023-10-30 ENCOUNTER — TELEPHONE (OUTPATIENT)
Dept: NEUROLOGY | Facility: CLINIC | Age: 83
End: 2023-10-30
Payer: MEDICARE

## 2023-10-30 NOTE — TELEPHONE ENCOUNTER
Provider: JOHNSON    Caller: SANDRA    Relationship to Patient: WIFE    Phone Number: [phone]     Reason for Call: PT'S WIFE CALLED AND STATES THAT PODIATRY CALLED  TO SCHEDULE APPT AND IT IS TO FAR AWAY FROM WHERE THEY LIVE. WIFE IS WANTING TO KNOW IF THEY CAN BE REFERRED SOMEWHERE IN [place].    PLEASE REVIEW, THANK YOU!

## 2023-10-31 NOTE — TELEPHONE ENCOUNTER
Referral has been sent over to [facility]. They will contact patients wife to schedule appointment.

## 2023-11-09 DIAGNOSIS — G60.3 IDIOPATHIC PROGRESSIVE NEUROPATHY: ICD-10-CM

## 2023-11-09 RX ORDER — GABAPENTIN 300 MG/1
CAPSULE ORAL
Qty: 270 CAPSULE | Refills: 1 | Status: SHIPPED | OUTPATIENT
Start: 2023-11-09

## 2024-01-11 DIAGNOSIS — G60.3 IDIOPATHIC PROGRESSIVE NEUROPATHY: ICD-10-CM

## 2024-01-11 RX ORDER — GABAPENTIN 300 MG/1
CAPSULE ORAL
Qty: 270 CAPSULE | Refills: 3 | Status: SHIPPED | OUTPATIENT
Start: 2024-01-11

## 2024-05-08 ENCOUNTER — OFFICE VISIT (OUTPATIENT)
Dept: NEUROLOGY | Facility: CLINIC | Age: 84
End: 2024-05-08
Payer: MEDICARE

## 2024-05-08 ENCOUNTER — TELEPHONE (OUTPATIENT)
Dept: NEUROLOGY | Facility: CLINIC | Age: 84
End: 2024-05-08

## 2024-05-08 VITALS
BODY MASS INDEX: 27.2 KG/M2 | OXYGEN SATURATION: 95 % | HEART RATE: 74 BPM | WEIGHT: 195 LBS | DIASTOLIC BLOOD PRESSURE: 106 MMHG | SYSTOLIC BLOOD PRESSURE: 154 MMHG

## 2024-05-08 DIAGNOSIS — G31.84 MCI (MILD COGNITIVE IMPAIRMENT): ICD-10-CM

## 2024-05-08 DIAGNOSIS — Z86.73 HISTORY OF STROKE: ICD-10-CM

## 2024-05-08 DIAGNOSIS — G60.3 IDIOPATHIC PROGRESSIVE NEUROPATHY: Primary | ICD-10-CM

## 2024-05-08 PROCEDURE — 99214 OFFICE O/P EST MOD 30 MIN: CPT | Performed by: NURSE PRACTITIONER

## 2024-05-08 PROCEDURE — 3080F DIAST BP >= 90 MM HG: CPT | Performed by: NURSE PRACTITIONER

## 2024-05-08 PROCEDURE — 3077F SYST BP >= 140 MM HG: CPT | Performed by: NURSE PRACTITIONER

## 2024-05-08 PROCEDURE — 1159F MED LIST DOCD IN RCRD: CPT | Performed by: NURSE PRACTITIONER

## 2024-05-08 PROCEDURE — 1160F RVW MEDS BY RX/DR IN RCRD: CPT | Performed by: NURSE PRACTITIONER

## 2024-05-08 RX ORDER — MEMANTINE HYDROCHLORIDE 5 MG-10 MG
KIT ORAL
Qty: 1 EACH | Refills: 0 | Status: SHIPPED | OUTPATIENT
Start: 2024-05-08

## 2024-05-08 RX ORDER — GABAPENTIN 300 MG/1
300 CAPSULE ORAL 3 TIMES DAILY
Qty: 279 CAPSULE | Refills: 1 | Status: SHIPPED | OUTPATIENT
Start: 2024-05-08 | End: 2024-05-10

## 2024-05-08 RX ORDER — ROSUVASTATIN CALCIUM 5 MG/1
5 TABLET, COATED ORAL NIGHTLY
Qty: 30 TABLET | Refills: 5 | Status: SHIPPED | OUTPATIENT
Start: 2024-05-08 | End: 2025-05-08

## 2024-05-08 RX ORDER — MEMANTINE HYDROCHLORIDE 10 MG/1
10 TABLET ORAL 2 TIMES DAILY
Qty: 60 TABLET | Refills: 5 | Status: SHIPPED | OUTPATIENT
Start: 2024-05-29 | End: 2025-05-29

## 2024-05-08 NOTE — TELEPHONE ENCOUNTER
"    Caller: Satnam Mccoy    Relationship: Emergency Contact    Best call back number: [phone]    Which medication are you concerned about: gabapentin (NEURONTIN) 300 MG capsule     Who prescribed you this medication: Rekha Munoz APRN     When did you start taking this medication: \"A LONG TIME AGO\"    What are your concerns: SHE STATES PT SHOULD BE TAKING 2,700 MGS DAILY AS HE HAS IN THE PAST. SHE STATES HE TAKE [id number] (900MG) THREE TIMES DAILY. PLEASE REVIEW AND ADVISE.     "

## 2024-05-09 NOTE — TELEPHONE ENCOUNTER
"  Caller: Nolan Mccoy Jr. \"TIFFANIE\"    Relationship: Self    Best call back number: [phone]    Which medication are you concerned about:   gabapentin (NEURONTIN) 300 MG capsule     Who prescribed you this medication:   BENJAMIN LOVE    When did you start taking this medication:     What are your concerns:   PT TAKES 3 CAPSULES 3 TIMES A DAY. PT REQUESTED THE SCRIPT BE THE SAME AMOUNT OF CAPSULES PER DAY HE WAS JUST ASKING IF THE SCRIPT COULD BE WRITTEN FOR A 31 DAY SUPPLY FOR TOTAL OF QUANTITY  CAPSULES IF POSSIBLE.     OTHERWISE PLEASE JUST GO BACK TO THE ORIGINAL SCRIPT INSTRUCTIONS WITH PT TAKING 9 CAPSULES PER DAY (3 CAPSULES 3X DAY) NOT 1 CAPSULE 3 X DAY.    THE SCRIPT THAT WAS SENT ON 5-8-24 FOR PT TO TAKE ONE TABLET 3 X DAY IS NOT CORRECT.  "

## 2024-05-10 ENCOUNTER — TELEPHONE (OUTPATIENT)
Dept: NEUROLOGY | Facility: CLINIC | Age: 84
End: 2024-05-10

## 2024-05-10 RX ORDER — GABAPENTIN 300 MG/1
900 CAPSULE ORAL 3 TIMES DAILY
Qty: 279 CAPSULE | Refills: 5 | Status: SHIPPED | OUTPATIENT
Start: 2024-05-10 | End: 2024-11-12

## 2024-05-10 NOTE — TELEPHONE ENCOUNTER
Provider: JOHNSON    Caller: SANDRA    Relationship to Patient: WIFE    Pharmacy: [facility] [id number]    Phone Number: [phone]     Reason for Call: PT WIFE CALLED AND IS WANTING TO KNOW IF ORIGINAL PERCEPTION FOR GABAPENTIN CAN BE CALLED IN AS THE ONE THAT WAS CALLED IN IS INCORRECT. WIFE STATES TO PLEASE CALL BACK WITH ANY QUESTIONS.    THANK YOU

## 2024-06-07 ENCOUNTER — TELEPHONE (OUTPATIENT)
Dept: NEUROLOGY | Facility: CLINIC | Age: 84
End: 2024-06-07

## 2024-06-07 NOTE — TELEPHONE ENCOUNTER
Continue simvastatin 20 mg daily.  Low-cholesterol diet.  Regular exercise.   PATIENT SPOUSE CALLING TO ADVISE, HE HAS TAKEN CALCIUM AND IS EXPERIENCING CONSTIPATION. SHE STARTED TO SAY ROSUVASTATIN SO NOT SURE IF SHE HAS MEDICATIONS SWITCHED.    SHE IS ASKING IF ANOTHER MEDICATION CAN BE TAKEN SO CONSTIPATION STOPS    PLEASE ADVISE SANDRA    THANK YOU

## 2024-06-10 NOTE — TELEPHONE ENCOUNTER
PATIENT SPOUSE STATED THAT THE ROSUVASTATIN IS CAUSING CONSTIPATION AND WOULD LIKE A NEW MEDICATION PRESCRIBED.

## 2024-06-19 ENCOUNTER — OFFICE VISIT (OUTPATIENT)
Dept: FAMILY MEDICINE CLINIC | Facility: CLINIC | Age: 84
End: 2024-06-19
Payer: MEDICARE

## 2024-06-19 VITALS
BODY MASS INDEX: 28 KG/M2 | SYSTOLIC BLOOD PRESSURE: 128 MMHG | TEMPERATURE: 96.9 F | OXYGEN SATURATION: 97 % | HEART RATE: 98 BPM | WEIGHT: 200 LBS | DIASTOLIC BLOOD PRESSURE: 64 MMHG | HEIGHT: 71 IN

## 2024-06-19 DIAGNOSIS — G31.84 MCI (MILD COGNITIVE IMPAIRMENT): ICD-10-CM

## 2024-06-19 DIAGNOSIS — Z86.010 HISTORY OF COLON POLYPS: ICD-10-CM

## 2024-06-19 DIAGNOSIS — G62.9 NEUROPATHY: ICD-10-CM

## 2024-06-19 DIAGNOSIS — N40.1 BENIGN PROSTATIC HYPERPLASIA WITH LOWER URINARY TRACT SYMPTOMS, SYMPTOM DETAILS UNSPECIFIED: ICD-10-CM

## 2024-06-19 DIAGNOSIS — Z86.73 HISTORY OF STROKE: Primary | ICD-10-CM

## 2024-06-19 DIAGNOSIS — K59.00 CONSTIPATION, UNSPECIFIED CONSTIPATION TYPE: ICD-10-CM

## 2024-06-19 PROCEDURE — 1159F MED LIST DOCD IN RCRD: CPT | Performed by: STUDENT IN AN ORGANIZED HEALTH CARE EDUCATION/TRAINING PROGRAM

## 2024-06-19 PROCEDURE — 3078F DIAST BP <80 MM HG: CPT | Performed by: STUDENT IN AN ORGANIZED HEALTH CARE EDUCATION/TRAINING PROGRAM

## 2024-06-19 PROCEDURE — 1160F RVW MEDS BY RX/DR IN RCRD: CPT | Performed by: STUDENT IN AN ORGANIZED HEALTH CARE EDUCATION/TRAINING PROGRAM

## 2024-06-19 PROCEDURE — G2211 COMPLEX E/M VISIT ADD ON: HCPCS | Performed by: STUDENT IN AN ORGANIZED HEALTH CARE EDUCATION/TRAINING PROGRAM

## 2024-06-19 PROCEDURE — 1125F AMNT PAIN NOTED PAIN PRSNT: CPT | Performed by: STUDENT IN AN ORGANIZED HEALTH CARE EDUCATION/TRAINING PROGRAM

## 2024-06-19 PROCEDURE — 3074F SYST BP LT 130 MM HG: CPT | Performed by: STUDENT IN AN ORGANIZED HEALTH CARE EDUCATION/TRAINING PROGRAM

## 2024-06-19 PROCEDURE — 99214 OFFICE O/P EST MOD 30 MIN: CPT | Performed by: STUDENT IN AN ORGANIZED HEALTH CARE EDUCATION/TRAINING PROGRAM

## 2024-06-19 RX ORDER — ATORVASTATIN CALCIUM 40 MG/1
40 TABLET, FILM COATED ORAL DAILY
Qty: 90 TABLET | Refills: 2 | Status: SHIPPED | OUTPATIENT
Start: 2024-06-19

## 2024-06-19 NOTE — PROGRESS NOTES
"Chief Complaint  Establish Care and Constipation (Pt has concerns for constipation, needs a referral.)    Subjective        Nolan Mccoy Jr. presents to [facility]  History of Present Illness  83 y.o. male with hypertension (no longer requiring medications), hyperlipidemia, prediabetes, BPH, and chronic low back pain status post previous laminectomy and fusion who is presents to establish care. He is accompanied by his wife.    History of CVA - 7/2020 - acute CVA of left internal capsule w/ narrowing of left vert artery. Echo normal. Currently on ASA alone. On zetia.     Has had MCI since stroke. MOCA 24/30 in 4/2023. Able to continue finances, drives short distances, able to remember most close family members names. Distant family members more difficult. Short term recall difficult. On aricept but incr confusion. Nervous to start memantine.     Hypertension -previously on lisinopril but this dropped his blood pressure.  He was taken off of all antihypertensives.    Neuropathy related to  L5/S1 radiculopathy? EMG in 2/2022 confirmed severe polyneuropathy. Hx of L2-S1 laminectomy and fusion with Dr. Luong/[place] in 2019. B12 borderline. Additional neuropathy labs normal.     Objective   Vital Signs:  /64   Pulse 98   Temp 96.9 °F (36.1 °C)   Ht 180.3 cm (71\")   Wt 90.7 kg (200 lb)   SpO2 97%   BMI 27.89 kg/m²   Estimated body mass index is 27.89 kg/m² as calculated from the following:    Height as of this encounter: 180.3 cm (71\").    Weight as of this encounter: 90.7 kg (200 lb).       Physical Exam  Constitutional:       General: He is not in acute distress.  Eyes:      Conjunctiva/sclera: Conjunctivae normal.   Cardiovascular:      Rate and Rhythm: Normal rate and regular rhythm.   Pulmonary:      Effort: Pulmonary effort is normal. No respiratory distress.      Breath sounds: Normal breath sounds.   Skin:     General: Skin is warm and dry.   Neurological:      Mental " Status: He is alert and oriented to person, place, and time.      Sensory: Sensory deficit present.   Psychiatric:         Mood and Affect: Mood normal.         Behavior: Behavior normal.        Result Review :    The following data was reviewed by: Dipika Lora MD on 06/19/2024:  Common labs          10/4/2023    10:49 4/4/2024    09:22   Common Labs   Hemoglobin A1C 5.8     5.9          Details          This result is from an external source.             Data reviewed : none             Assessment and Plan     Diagnoses and all orders for this visit:    1. History of stroke (Primary)  Assessment & Plan:  Left internal capsule CVA - 7/2020.  On zetia and ASA 81mg. Continue.   Mgmt per neuro    Orders:  -     atorvastatin (LIPITOR) 40 MG tablet; Take 1 tablet by mouth Daily.  Dispense: 90 tablet; Refill: 2    2. History of colon polyps  -     Ambulatory Referral For Screening Colonoscopy    3. Benign prostatic hyperplasia with lower urinary tract symptoms, symptom details unspecified  Assessment & Plan:  Stable with flomax and dutasteride. Continue.      4. Neuropathy  Assessment & Plan:  Bilateral. Likely related to spinal stenosis. No hx of DM. No alcohol use.  Stable on gabapentin - currently prescribed by neuro. Continue mgmt per Neuro.       5. MCI (mild cognitive impairment)  Assessment & Plan:  MOCA 4/2023 with 24/30  Continues finances.  Tried aricept with incr confusion. Hesitant to try memantine.  Continue to modify vascular risk factors.       6. Constipation, unspecified constipation type  Comments:  discussed fiber supplementatin, incr hydration and activity. consider miralax daily.             Follow Up     Return in about 6 months (around 12/19/2024) for Medicare Wellness.  Patient was given instructions and counseling regarding his condition or for health maintenance advice. Please see specific information pulled into the AVS if appropriate.

## 2024-06-22 PROBLEM — M48.061 LUMBAR SPINAL STENOSIS: Status: RESOLVED | Noted: 2017-12-26 | Resolved: 2024-06-22

## 2024-06-22 PROBLEM — R47.9 SPEECH DISTURBANCE: Status: RESOLVED | Noted: 2020-07-22 | Resolved: 2024-06-22

## 2024-06-22 NOTE — ASSESSMENT & PLAN NOTE
Bilateral. Likely related to spinal stenosis. No hx of DM. No alcohol use.  Stable on gabapentin - currently prescribed by neuro. Continue mgmt per Neuro.

## 2024-06-22 NOTE — ASSESSMENT & PLAN NOTE
MOCA 4/2023 with 24/30  Continues finances.  Tried aricept with incr confusion. Hesitant to try memantine.  Continue to modify vascular risk factors.

## 2024-08-13 ENCOUNTER — TELEPHONE (OUTPATIENT)
Dept: FAMILY MEDICINE CLINIC | Facility: CLINIC | Age: 84
End: 2024-08-13
Payer: MEDICARE

## 2024-08-13 NOTE — TELEPHONE ENCOUNTER
DELETE AFTER REVIEWING: Send this encounter to the appropriate pool. See your Call Action Grid or Workflows for direction.    Caller: Sandra Mccoy    Relationship: Emergency Contact    Best call back number: [phone]     What orders are you requesting (i.e. lab or imaging): VITAMIN B LABS    In what timeframe would the patient need to come in: ASAP    Where will you receive your lab/imaging services: IN OFFICE    Additional notes: PATIENT HAS NEUROPATHY AND HAS BEEN TAKING VITAMIN B   SANDRA HAS HEARD THAT VITAMIN B IS NOT GOOD FOR A PATIENT WITH NEUROPATHY

## 2024-08-19 ENCOUNTER — OFFICE VISIT (OUTPATIENT)
Dept: FAMILY MEDICINE CLINIC | Facility: CLINIC | Age: 84
End: 2024-08-19
Payer: MEDICARE

## 2024-08-19 VITALS
TEMPERATURE: 97.4 F | HEIGHT: 71 IN | DIASTOLIC BLOOD PRESSURE: 72 MMHG | HEART RATE: 74 BPM | BODY MASS INDEX: 29.12 KG/M2 | SYSTOLIC BLOOD PRESSURE: 130 MMHG | OXYGEN SATURATION: 95 % | WEIGHT: 208 LBS

## 2024-08-19 DIAGNOSIS — R79.9 ABNORMAL FINDING OF BLOOD CHEMISTRY, UNSPECIFIED: ICD-10-CM

## 2024-08-19 DIAGNOSIS — G62.9 NEUROPATHY: ICD-10-CM

## 2024-08-19 DIAGNOSIS — Z00.00 MEDICARE ANNUAL WELLNESS VISIT, SUBSEQUENT: Primary | ICD-10-CM

## 2024-08-19 DIAGNOSIS — D53.9 NUTRITIONAL ANEMIA, UNSPECIFIED: ICD-10-CM

## 2024-08-19 PROCEDURE — 1125F AMNT PAIN NOTED PAIN PRSNT: CPT | Performed by: STUDENT IN AN ORGANIZED HEALTH CARE EDUCATION/TRAINING PROGRAM

## 2024-08-19 PROCEDURE — 1160F RVW MEDS BY RX/DR IN RCRD: CPT | Performed by: STUDENT IN AN ORGANIZED HEALTH CARE EDUCATION/TRAINING PROGRAM

## 2024-08-19 PROCEDURE — 1159F MED LIST DOCD IN RCRD: CPT | Performed by: STUDENT IN AN ORGANIZED HEALTH CARE EDUCATION/TRAINING PROGRAM

## 2024-08-19 PROCEDURE — 1170F FXNL STATUS ASSESSED: CPT | Performed by: STUDENT IN AN ORGANIZED HEALTH CARE EDUCATION/TRAINING PROGRAM

## 2024-08-19 PROCEDURE — 3075F SYST BP GE 130 - 139MM HG: CPT | Performed by: STUDENT IN AN ORGANIZED HEALTH CARE EDUCATION/TRAINING PROGRAM

## 2024-08-19 PROCEDURE — 3078F DIAST BP <80 MM HG: CPT | Performed by: STUDENT IN AN ORGANIZED HEALTH CARE EDUCATION/TRAINING PROGRAM

## 2024-08-19 PROCEDURE — G0439 PPPS, SUBSEQ VISIT: HCPCS | Performed by: STUDENT IN AN ORGANIZED HEALTH CARE EDUCATION/TRAINING PROGRAM

## 2024-08-19 NOTE — ASSESSMENT & PLAN NOTE
Patient was counseled in regards to maintaining a healthy lifestyle, rich in whole grains, fruits and vegetables. Limit high saturated fats and processed sugars. Maintain an active lifestyle to promote overall health and well being.

## 2024-08-19 NOTE — PROGRESS NOTES
Subjective   The ABCs of the Annual Wellness Visit  Medicare Wellness Visit      Nolan Mccoy Jr. is a 83 y.o. patient who presents for a Medicare Wellness Visit.    The following portions of the patient's history were reviewed and   updated as appropriate: allergies, current medications, past family history, past medical history, past social history, past surgical history, and problem list.    Compared to one year ago, the patient's physical   health is the same.  Compared to one year ago, the patient's mental   health is the same.    Recent Hospitalizations:  He was not admitted to the hospital during the last year.     Current Medical Providers:  Patient Care Team:  Dipika Lora MD as PCP - General (Family Medicine)  Cheng Ngo MD (Internal Medicine)    Outpatient Medications Prior to Visit   Medication Sig Dispense Refill    acetaminophen (TYLENOL) 325 MG tablet Take 2 tablets by mouth Every 6 (Six) Hours As Needed for Mild Pain.      aspirin 81 MG EC tablet Take 1 tablet by mouth Daily.      atorvastatin (LIPITOR) 40 MG tablet Take 1 tablet by mouth Daily. 90 tablet 2    B Complex Vitamins (VITAMIN B COMPLEX PO) Take  by mouth.      Ca Phosphate-Cholecalciferol (CALCIUM/VITAMIN D3 GUMMIES PO) Take  by mouth.      calcium carbonate (TUMS) 500 MG chewable tablet Chew 1 tablet Every 4 (Four) Hours As Needed for Indigestion or Heartburn.      dutasteride (AVODART) 0.5 MG capsule Take 1 capsule by mouth Daily.      gabapentin (NEURONTIN) 300 MG capsule Take 3 capsules by mouth 3 (Three) Times a Day for 186 days. 279 capsule 5    Multiple Vitamins-Minerals (MULTIVITAMIN WITH MINERALS) tablet Take 1 tablet by mouth Daily.      tamsulosin (FLOMAX) 0.4 MG capsule 24 hr capsule Take 1 capsule by mouth Daily.      Cyanocobalamin (VITAMIN B12 PO) Take  by mouth. (Patient not taking: Reported on 8/19/2024)       No facility-administered medications prior to visit.     No opioid medication identified on  "active medication list. I have reviewed chart for other potential  high risk medication/s and harmful drug interactions in the elderly.      Aspirin is on active medication list. Aspirin use is indicated based on review of current medical condition/s. Pros and cons of this therapy have been discussed today. Benefits of this medication outweigh potential harm.  Patient has been encouraged to continue taking this medication.  .      Patient Active Problem List   Diagnosis    BPH (benign prostatic hyperplasia)    Hypertension    Neuropathy    Screen for colon cancer    Right bundle branch block (RBBB)    GERD (gastroesophageal reflux disease)    Spinal stenosis of lumbar region with neurogenic claudication    Postoperative hypotension    Postoperative anemia due to acute blood loss    Thrombocytopenia due to blood loss    Atrial tachycardia    Thoracic compression fracture, closed, initial encounter    Chronic low back pain with sciatica    IFG (impaired fasting glucose)    Lumbar radiculopathy, chronic    Prediabetes    Other hyperlipidemia    S/P lumbar fusion    Varicose veins of leg with pain, right    Vitamin D deficiency    History of stroke    MCI (mild cognitive impairment)    Medicare annual wellness visit, subsequent     Advance Care Planning Advance Directive is on file.  ACP discussion was held with the patient during this visit. Patient has an advance directive in EMR which is still valid.             Objective   Vitals:    08/19/24 1358   BP: 130/72   Pulse: 74   Temp: 97.4 °F (36.3 °C)   SpO2: 95%   Weight: 94.3 kg (208 lb)   Height: 180.3 cm (71\")   PainSc:   2       Estimated body mass index is 29.01 kg/m² as calculated from the following:    Height as of this encounter: 180.3 cm (71\").    Weight as of this encounter: 94.3 kg (208 lb).    BMI is >= 25 and <30. (Overweight) The following options were offered after discussion;: nutrition counseling/recommendations       Does the patient have evidence of " cognitive impairment? Yes, mild cognitive impairment. Last MOCA .                                                                                                Health  Risk Assessment    Smoking Status:  Social History     Tobacco Use   Smoking Status Never    Passive exposure: Never   Smokeless Tobacco Never   Tobacco Comments    chew on cigars but do not light     Alcohol Consumption:  Social History     Substance and Sexual Activity   Alcohol Use Yes    Alcohol/week: 3.0 standard drinks of alcohol    Types: 3 Cans of beer per week    Comment: 1 drink every other day occasionally       Fall Risk Screen  STEADI Fall Risk Assessment was completed, and patient is at LOW risk for falls.Assessment completed on:2024    Depression Screenin/19/2024     9:18 AM   PHQ-2/PHQ-9 Depression Screening   Little Interest or Pleasure in Doing Things 0-->not at all   Feeling Down, Depressed or Hopeless 0-->not at all   PHQ-9: Brief Depression Severity Measure Score 0     Health Habits and Functional and Cognitive Screenin/19/2024     3:00 PM   Functional & Cognitive Status   Do you have difficulty preparing food and eating? No   Do you have difficulty bathing yourself, getting dressed or grooming yourself? No   Do you have difficulty using the toilet? No   Do you have difficulty moving around from place to place? Yes   Do you have trouble with steps or getting out of a bed or a chair? Yes   Current Diet Well Balanced Diet   Dental Exam Unknown   Eye Exam Unknown   Exercise (times per week) 0 times per week   Current Exercises Include No Regular Exercise   Do you need help using the phone?  No   Are you deaf or do you have serious difficulty hearing?  Yes   Do you need help to go to places out of walking distance? Yes   Do you need help shopping? No   Do you need help preparing meals?  No   Do you need help with housework?  No   Do you need help with laundry? No   Do you need help taking your medications?  No   Do you need help managing money? No   Do you ever drive or ride in a car without wearing a seat belt? No   Have you felt unusual stress, anger or loneliness in the last month? No   Who do you live with? Spouse   If you need help, do you have trouble finding someone available to you? No   Do you have difficulty concentrating, remembering or making decisions? No           Age-appropriate Screening Schedule:  Refer to the list below for future screening recommendations based on patient's age, sex and/or medical conditions. Orders for these recommended tests are listed in the plan section. The patient has been provided with a written plan.    Health Maintenance List  Health Maintenance   Topic Date Due    TDAP/TD VACCINES (1 - Tdap) Never done    ZOSTER VACCINE (1 of 2) Never done    RSV Vaccine - Adults (1 - 1-dose 60+ series) Never done    Pneumococcal Vaccine 65+ (1 of 1 - PCV) Never done    ANNUAL WELLNESS VISIT  01/09/2021    BMI FOLLOWUP  12/22/2021    COVID-19 Vaccine (6 - 2023-24 season) 02/13/2024    INFLUENZA VACCINE  08/01/2024    LIPID PANEL  04/04/2025    COLORECTAL CANCER SCREENING  07/22/2034                                                                                                                                                CMS Preventative Services Quick Reference  Risk Factors Identified During Encounter  Chronic Pain: Natural history and expected course discussed. Questions answered.  Fall Risk-High or Moderate: Discussed Fall Prevention in the home  Hearing Problem:  stable  Immunizations Discussed/Encouraged: Tdap, Influenza, Shingrix, and COVID19  Dental Screening Recommended  Vision Screening Recommended    The above risks/problems have been discussed with the patient.  Pertinent information has been shared with the patient in the After Visit Summary.  An After Visit Summary and PPPS were made available to the patient.    Follow Up:   Next Medicare Wellness visit to be scheduled in 1  year.     Assessment & Plan  Medicare annual wellness visit, subsequent  Patient was counseled in regards to maintaining a healthy lifestyle, rich in whole grains, fruits and vegetables. Limit high saturated fats and processed sugars. Maintain an active lifestyle to promote overall health and well being.     Neuropathy    Abnormal finding of blood chemistry, unspecified    Nutritional anemia, unspecified      Orders Placed This Encounter   Procedures    Comprehensive Metabolic Panel    ORDER: Hemoglobin A1c    TSH    Vitamin B12    Folate    CBC Auto Differential             Follow Up:   No follow-ups on file.

## 2024-08-20 LAB
ALBUMIN SERPL-MCNC: 4 G/DL (ref 3.5–5.2)
ALBUMIN/GLOB SERPL: 1.6 G/DL
ALP SERPL-CCNC: 80 U/L (ref 39–117)
ALT SERPL-CCNC: 15 U/L (ref 1–41)
AST SERPL-CCNC: 18 U/L (ref 1–40)
BASOPHILS # BLD AUTO: 0.06 10*3/MM3 (ref 0–0.2)
BASOPHILS NFR BLD AUTO: 1 % (ref 0–1.5)
BILIRUB SERPL-MCNC: 0.5 MG/DL (ref 0–1.2)
BUN SERPL-MCNC: 9 MG/DL (ref 8–23)
BUN/CREAT SERPL: 8.2 (ref 7–25)
CALCIUM SERPL-MCNC: 8.9 MG/DL (ref 8.6–10.5)
CHLORIDE SERPL-SCNC: 105 MMOL/L (ref 98–107)
CO2 SERPL-SCNC: 27.5 MMOL/L (ref 22–29)
CREAT SERPL-MCNC: 1.1 MG/DL (ref 0.76–1.27)
EGFRCR SERPLBLD CKD-EPI 2021: 67 ML/MIN/1.73
EOSINOPHIL # BLD AUTO: 0.16 10*3/MM3 (ref 0–0.4)
EOSINOPHIL NFR BLD AUTO: 2.7 % (ref 0.3–6.2)
ERYTHROCYTE [DISTWIDTH] IN BLOOD BY AUTOMATED COUNT: 13.9 % (ref 12.3–15.4)
FOLATE SERPL-MCNC: >20 NG/ML (ref 4.78–24.2)
GLOBULIN SER CALC-MCNC: 2.5 GM/DL
GLUCOSE SERPL-MCNC: 85 MG/DL (ref 65–99)
HBA1C MFR BLD: 5.8 % (ref 4.8–5.6)
HCT VFR BLD AUTO: 44.8 % (ref 37.5–51)
HGB BLD-MCNC: 15.4 G/DL (ref 13–17.7)
IMM GRANULOCYTES # BLD AUTO: 0.01 10*3/MM3 (ref 0–0.05)
IMM GRANULOCYTES NFR BLD AUTO: 0.2 % (ref 0–0.5)
LYMPHOCYTES # BLD AUTO: 0.8 10*3/MM3 (ref 0.7–3.1)
LYMPHOCYTES NFR BLD AUTO: 13.5 % (ref 19.6–45.3)
MCH RBC QN AUTO: 32.8 PG (ref 26.6–33)
MCHC RBC AUTO-ENTMCNC: 34.4 G/DL (ref 31.5–35.7)
MCV RBC AUTO: 95.5 FL (ref 79–97)
MONOCYTES # BLD AUTO: 0.55 10*3/MM3 (ref 0.1–0.9)
MONOCYTES NFR BLD AUTO: 9.3 % (ref 5–12)
NEUTROPHILS # BLD AUTO: 4.35 10*3/MM3 (ref 1.7–7)
NEUTROPHILS NFR BLD AUTO: 73.3 % (ref 42.7–76)
PLATELET # BLD AUTO: 111 10*3/MM3 (ref 140–450)
POTASSIUM SERPL-SCNC: 4.4 MMOL/L (ref 3.5–5.2)
PROT SERPL-MCNC: 6.5 G/DL (ref 6–8.5)
RBC # BLD AUTO: 4.69 10*6/MM3 (ref 4.14–5.8)
SODIUM SERPL-SCNC: 139 MMOL/L (ref 136–145)
TSH SERPL DL<=0.005 MIU/L-ACNC: 3.29 UIU/ML (ref 0.27–4.2)
VIT B12 SERPL-MCNC: 692 PG/ML (ref 211–946)
WBC # BLD AUTO: 5.93 10*3/MM3 (ref 3.4–10.8)

## 2024-11-11 DIAGNOSIS — G60.3 IDIOPATHIC PROGRESSIVE NEUROPATHY: ICD-10-CM

## 2024-11-11 RX ORDER — GABAPENTIN 300 MG/1
CAPSULE ORAL
Qty: 279 CAPSULE | Refills: 5 | Status: SHIPPED | OUTPATIENT
Start: 2024-11-11

## 2025-01-31 ENCOUNTER — OFFICE VISIT (OUTPATIENT)
Dept: FAMILY MEDICINE CLINIC | Facility: CLINIC | Age: 85
End: 2025-01-31
Payer: MEDICARE

## 2025-01-31 VITALS
HEART RATE: 85 BPM | DIASTOLIC BLOOD PRESSURE: 60 MMHG | WEIGHT: 203.4 LBS | HEIGHT: 71 IN | TEMPERATURE: 98 F | BODY MASS INDEX: 28.48 KG/M2 | SYSTOLIC BLOOD PRESSURE: 108 MMHG | OXYGEN SATURATION: 97 %

## 2025-01-31 DIAGNOSIS — J06.9 UPPER RESPIRATORY TRACT INFECTION DUE TO COVID-19 VIRUS: Primary | ICD-10-CM

## 2025-01-31 DIAGNOSIS — U07.1 UPPER RESPIRATORY TRACT INFECTION DUE TO COVID-19 VIRUS: Primary | ICD-10-CM

## 2025-01-31 DIAGNOSIS — J06.9 URI, ACUTE: ICD-10-CM

## 2025-01-31 LAB
EXPIRATION DATE: ABNORMAL
FLUAV AG UPPER RESP QL IA.RAPID: NOT DETECTED
FLUBV AG UPPER RESP QL IA.RAPID: NOT DETECTED
INTERNAL CONTROL: ABNORMAL
Lab: ABNORMAL
SARS-COV-2 AG UPPER RESP QL IA.RAPID: DETECTED

## 2025-01-31 PROCEDURE — 1125F AMNT PAIN NOTED PAIN PRSNT: CPT | Performed by: STUDENT IN AN ORGANIZED HEALTH CARE EDUCATION/TRAINING PROGRAM

## 2025-01-31 PROCEDURE — 87428 SARSCOV & INF VIR A&B AG IA: CPT | Performed by: STUDENT IN AN ORGANIZED HEALTH CARE EDUCATION/TRAINING PROGRAM

## 2025-01-31 PROCEDURE — 3078F DIAST BP <80 MM HG: CPT | Performed by: STUDENT IN AN ORGANIZED HEALTH CARE EDUCATION/TRAINING PROGRAM

## 2025-01-31 PROCEDURE — 3074F SYST BP LT 130 MM HG: CPT | Performed by: STUDENT IN AN ORGANIZED HEALTH CARE EDUCATION/TRAINING PROGRAM

## 2025-01-31 PROCEDURE — 99213 OFFICE O/P EST LOW 20 MIN: CPT | Performed by: STUDENT IN AN ORGANIZED HEALTH CARE EDUCATION/TRAINING PROGRAM

## 2025-01-31 RX ORDER — NIRMATRELVIR AND RITONAVIR 150-100 MG
2 KIT ORAL 2 TIMES DAILY
Qty: 20 TABLET | Refills: 0 | Status: SHIPPED | OUTPATIENT
Start: 2025-01-31 | End: 2025-01-31 | Stop reason: SDUPTHER

## 2025-01-31 RX ORDER — AMOXICILLIN 875 MG/1
875 TABLET, COATED ORAL 2 TIMES DAILY
COMMUNITY
Start: 2025-01-28

## 2025-01-31 RX ORDER — NIRMATRELVIR AND RITONAVIR 150-100 MG
2 KIT ORAL 2 TIMES DAILY
Qty: 20 TABLET | Refills: 0 | Status: SHIPPED | OUTPATIENT
Start: 2025-01-31

## 2025-01-31 NOTE — PATIENT INSTRUCTIONS
STOP Amoxicillin (antibiotic)  While on Paxlovid, STOP BOTH ATORVASTATIN & FLOMAX  OK to start again in 5 days after completed Paxlovid

## 2025-01-31 NOTE — PROGRESS NOTES
"Chief Complaint  Covid Test Only    Subjective        Nolan Mccoy Jr. presents to [facility]  History of Present Illness    Pt here today for nasal congestion and cough in the setting of known COVID exposure  Pt's wife was seen today for URI symptoms and tested positive for COVID.  However she noted during the appointment that her  was sick first, his symptoms started 4 days ago.  He was seen at the [facility] and wife did not know if patient was tested for COVID.  Says she believes he was only tested for influenza, but then was prescribed amoxicillin.  Therefore, advised patient to schedule appointment to be officially tested and chart reviewed before sending in Paxlovid prescription that patient's wife requested for him    Patient is a poor historian and wife answers most the questions for him  When asked what symptoms patient has, he said \"I do not have any symptoms\" but then on further questioning does endorse nasal congestion and cough  He was prescribed Tessalon Perles, which she says are helping with the cough  Patient does not remember if he was tested for COVID at the [facility]  He denies fevers, chills, or shortness of breath      Objective   Vital Signs:  /60   Pulse 85   Temp 98 °F (36.7 °C)   Ht 180.3 cm (71\")   Wt 92.3 kg (203 lb 6.4 oz)   SpO2 97%   BMI 28.37 kg/m²   Estimated body mass index is 28.37 kg/m² as calculated from the following:    Height as of this encounter: 180.3 cm (71\").    Weight as of this encounter: 92.3 kg (203 lb 6.4 oz).            Physical Exam  Constitutional:       General: He is not in acute distress.     Appearance: Normal appearance. He is not ill-appearing.   HENT:      Head: Normocephalic and atraumatic.      Mouth/Throat:      Mouth: Mucous membranes are moist.      Pharynx: Oropharynx is clear. Posterior oropharyngeal erythema (mild) present. No oropharyngeal exudate.   Eyes:      Extraocular Movements: " Extraocular movements intact.   Cardiovascular:      Rate and Rhythm: Normal rate.   Pulmonary:      Effort: Pulmonary effort is normal. No respiratory distress.      Breath sounds: Normal breath sounds. No wheezing or rhonchi.   Neurological:      Mental Status: He is alert.        Result Review :                Assessment and Plan   Diagnoses and all orders for this visit:    1. Upper respiratory tract infection due to COVID-19 virus (Primary)  2. URI, acute  -As expected, patient did test positive for COVID.  Unfortunately little clinic note is not available to review  -Patient advised do not believe antibiotics are necessary, as his symptoms are likely due to the COVID infection  -Therefore, counseled to discontinue the amoxicillin and start Paxlovid  -CMP review over last 6 months demonstrates EGFR ranging between 58-67  -To be safe given EGFR borderline, will prescribe decreased dose Paxlovid as below  -Patient and wife counseled to hold his Flomax and atorvastatin while taking Paxlovid and okay to resume both once the Paxlovid course is complete  -Continue Tessalon Perles prn, acetaminophen, and maintaining good hydration    -     Nirmatrelvir&Ritonavir 150/100 (Paxlovid, 150/100,) 10 x 150 MG & 10 x 100MG tablet therapy pack tablet (for renal adjustment); Take 2 tablets by mouth 2 (Two) Times a Day.  Dispense: 20 tablet; Refill: 0  -     POCT SARS-CoV-2 Antigen SURJIT + Flu           Follow Up   Return if symptoms worsen or fail to improve.  Patient was given instructions and counseling regarding his condition or for health maintenance advice. Please see specific information pulled into the AVS if appropriate.

## 2025-02-07 NOTE — PROGRESS NOTES
CC: F/U h/o stroke, memory loss, peripheral neuropathy    HPI:  Nolan Mccoy Jr. is a  84 y.o.  right-handed malehypertension (no longer requiring medications), hyperlipidemia, prediabetes, BPH, and chronic low back pain status post previous laminectomy and fusion who is being seen in follow-up today for history of stroke, memory loss, and peripheral neuropathy.  He is accompanied by his wife.     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  CTA head/neck showed plaque moderately narrowing the left vertebral artery as well as stenotic right vertebral artery.  2D echo showed EF of 61 to 65%, normal left atrial size, and saline test results were negative.  He was started on DAPT for 90 days then recommended to continue aspirin.  He was also started on atorvastatin 80 mg though his wife felt he had cognitive side effects to this so he was switched to Zetia. They also tried lower dose crestor at one point. Now taking atorvastatin 40 mg daily. There was no atrial fibrillation on 48-hour Holter.     Following his stroke his wife reported issues with memory loss and the patient.  In September 2020 MoCA score was 19 out of 30 and it was 20 out of 30 in April 2021.  He underwent neuropsych testing with [facility] in October 2021 and received a diagnosis of mild cognitive impairment.  In November 2021 MoCA score was improved to 24/30, and was 24 out of 30 May 2022.  We had tried him on Aricept but at 10 mg he developed confusion and has stopped taking this.  Most recent MoCA score was 24 out of 30 in April 2023. Last visit tried Namenda. He is no longer taking this but doesn't recall side effects and wants to try again. Feels memory a little worse since last visit but still completely functional with ADLs. No issues driving.     He has had symptoms of peripheral neuropathy for over 10 years.  He had an L2-S1 laminectomy and fusion by Dr. Luong and Dr. Little in 2019 in hopes  that it would improve his symptoms but it did not.His B12 was borderline at 392 in 2019.  He took B vitamins for a while after that.  In September 2020 I checked additional labs for treatable causes of peripheral neuropathy: TSH was 3.1, free T4 was 1.01, heavy metal screen was normal, sedimentation rate was 3, folate was greater than 20, immunofixation showed no monoclonality/M spike, cryoglobulin was not detected, and RPR was nonreactive.  He has been maintained on gabapentin.  Previously tried Lyrica but had worsening symptoms so was switched back to gabapentin.     EMG done in February 2022 showed severe polyneuropathy, superimposed bilateral L5 or S1 radiculopathies cannot be entirely excluded.  There was acute denervation.  Based on this I ordered an MRI lumbar spine without contrast completed 3/2022 which showed his previous L2-L5 laminectomy with L2-S1 posterior fusion with degenerative changes but no recurrent stenosis.  There was a mild T12 compression fracture noted which was felt to be chronic.     Today he reports he has not had any TIA or stroke symptoms since his last visit.  He continues to take aspirin 81 mg daily and atorvastatin 40 mg daily.  As noted above he wants to retry Namenda as he does not recall why he stopped taking it.  Reports he has seen a [place] neurologist in May for neuropathy evaluation.  Continues to take gabapentin 900 mg 3 times daily for painful neuropathy symptoms.  Uses a cane.  No falls since I last saw him though he feels balance is worsening.     Most recent labs completed in August 2024: B12 level 692, CMP unremarkable, CBC notable for platelets of 111, Hgb A1c 5.8%,  folate greater than 20, TSH 3.2.    The above history was reviewed and updated.     Past Medical History:   Diagnosis Date    Arthritis     BPH (benign prostatic hyperplasia)     Cataracts, bilateral     Erectile dysfunction 2022    Fractures     GERD (gastroesophageal reflux disease)     History of  transfusion     HL (hearing loss)     Hypertension     Injury of back     Low back pain     Long story    Numbness     Peripheral neuropathy     Prostatitis     Right bundle branch block     Spinal stenosis of lumbar region     NUMBNESS/ TINGLING BILAT FEET, PAIN DOWN LEFT LEG     Stroke          Past Surgical History:   Procedure Laterality Date    BACK SURGERY      COLONOSCOPY      FRACTURE SURGERY      LEFT LEG COMPOUND FRACTURE AGE 10    LUMBAR DISCECTOMY FUSION INSTRUMENTATION N/A 08/12/2019    Procedure: L2-S1 Laminectomy and Fusion with Instrumentation with Dr. Luong and Dr. Little;  Surgeon: Alex Little MD;  Location: [facility];  Service: Neurosurgery    LUMBAR LAMINECTOMY DISCECTOMY DECOMPRESSION N/A 08/12/2019    Procedure: L2-S1 laminectomy with a fusion by orthopedics;  Surgeon: Adin Luong MD;  Location: [facility];  Service: Neurosurgery    TONSILLECTOMY      age 7    UMBILICAL HERNIA REPAIR             Current Outpatient Medications:     acetaminophen (TYLENOL) 325 MG tablet, Take 2 tablets by mouth Every 6 (Six) Hours As Needed for Mild Pain., Disp: , Rfl:     aspirin 81 MG EC tablet, Take 1 tablet by mouth Daily., Disp: , Rfl:     atorvastatin (LIPITOR) 40 MG tablet, Take 1 tablet by mouth Daily., Disp: 90 tablet, Rfl: 2    B Complex Vitamins (VITAMIN B COMPLEX PO), Take  by mouth., Disp: , Rfl:     Ca Phosphate-Cholecalciferol (CALCIUM/VITAMIN D3 GUMMIES PO), Take  by mouth., Disp: , Rfl:     calcium carbonate (TUMS) 500 MG chewable tablet, Chew 1 tablet Every 4 (Four) Hours As Needed for Indigestion or Heartburn., Disp: , Rfl:     dutasteride (AVODART) 0.5 MG capsule, Take 1 capsule by mouth Daily., Disp: , Rfl:     gabapentin (NEURONTIN) 300 MG capsule, Take 3 capsules by mouth 3 (Three) Times a Day., Disp: 279 capsule, Rfl: 5    Multiple Vitamins-Minerals (MULTIVITAMIN WITH MINERALS) tablet, Take 1 tablet by mouth Daily., Disp: , Rfl:     tamsulosin (FLOMAX) 0.4 MG capsule 24  "hr capsule, Take 1 capsule by mouth Daily., Disp: , Rfl:     memantine (Namenda Titration Alan) 28 x 5 MG & 21 x 10 MG tablet pack, Follow package directions. To be followed with prescription for 10 mg BID, Disp: 1 each, Rfl: 0      Family History   Problem Relation Age of Onset    Hypertension Father     Neuropathy Father     Diabetes Sister     Alzheimer's disease Mother     Cancer Son     Malmike Hyperthermia Neg Hx          Social History     Socioeconomic History    Marital status:    Tobacco Use    Smoking status: Never     Passive exposure: Never    Smokeless tobacco: Never    Tobacco comments:     chew on cigars but do not light   Vaping Use    Vaping status: Never Used   Substance and Sexual Activity    Alcohol use: Yes     Alcohol/week: 3.0 standard drinks of alcohol     Types: 3 Cans of beer per week     Comment: 1 drink every other day occasionally    Drug use: Never     Comment: CBD Oil    Sexual activity: Not Currently         Allergies   Allergen Reactions    Sulfa Antibiotics Rash    Penicillins Other (See Comments)     Tolerated cefazolin August 2019  CAUSED RASH BACK OF THROAT    Sulfa Antibiotics Hives             Physical Exam:  Vitals:    02/12/25 1328   BP: 120/80   Pulse: 90   SpO2: 98%   Weight: 90.3 kg (199 lb)   Height: 180.3 cm (71\")     Orthostatic BP:    Body mass index is 27.75 kg/m².    General appearance: Well developed, well nourished, well groomed, alert and cooperative.   HEENT: Normocephalic.   Cardiac: Regular rate and rhythm.   Chest Exam: Clear to auscultation bilaterally, no wheezes, no rhonchi.  Extremities: Normal, no edema.       Higher integrative function: Oriented x3 , mildly impaired recent/remote memory, intact attention span, concentration and language. Spontaneous speech, fund of vocabulary are normal.   Cranial nerves: Visual fields intact bilaterally, extraocular movements intact without nystagmus, PERRL, normal facial sensation, face symmetric, tongue midline, " no dysarthria.  Motor: Normal muscle strength, bulk, and tone in upper and lower extremities except bilateral dorsiflexion 4/5. No fasciculations, rigidity, spasticity or abnormal movements.   Sensation: Decreased to LT in BLE distal to knees.  Station and gait: Broad-based, antalgic gait, uses cane.  Coordination: Finger to nose test showed no dysmetria.     Results:      Lab Results   Component Value Date    GLUCOSE 85 08/19/2024    BUN 9 08/19/2024    CREATININE 1.10 08/19/2024    EGFRIFNONA 71 04/09/2021    EGFRIFAFRI >60 10/10/2022    BCR 8.2 08/19/2024    CO2 27.5 08/19/2024    CALCIUM 8.9 08/19/2024    ALBUMIN 4.0 08/19/2024    LABIL2 1.6 08/19/2024    AST 18 08/19/2024    ALT 15 08/19/2024       Lab Results   Component Value Date    WBC 5.93 08/19/2024    HGB 15.4 08/19/2024    HCT 44.8 08/19/2024    MCV 95.5 08/19/2024     (L) 08/19/2024         .  Lab Results   Component Value Date    RPR Non-Reactive 09/22/2020         Lab Results   Component Value Date    TSH 3.290 08/19/2024         Lab Results   Component Value Date    [id number] 692 08/19/2024         Lab Results   Component Value Date    FOLATE >20.00 08/19/2024         Lab Results   Component Value Date    HGBA1C 5.80 (H) 08/19/2024         Lab Results   Component Value Date    GLUCOSE 85 08/19/2024    BUN 9 08/19/2024    CREATININE 1.10 08/19/2024    EGFRIFNONA 71 04/09/2021    EGFRIFAFRI >60 10/10/2022    BCR 8.2 08/19/2024    K 4.4 08/19/2024    CO2 27.5 08/19/2024    CALCIUM 8.9 08/19/2024    ALBUMIN 4.0 08/19/2024    LABIL2 1.6 08/19/2024    AST 18 08/19/2024    ALT 15 08/19/2024         Lab Results   Component Value Date    WBC 5.93 08/19/2024    HGB 15.4 08/19/2024    HCT 44.8 08/19/2024    MCV 95.5 08/19/2024     (L) 08/19/2024             Assessment/Plan:       Diagnoses and all orders for this visit:    1. Idiopathic progressive neuropathy  -     gabapentin (NEURONTIN) 300 MG capsule; Take 3 capsules by mouth 3 (Three) Times  a Day.  Dispense: 279 capsule; Refill: 5    Other orders  -     memantine (Namenda Titration Alan) 28 x 5 MG & 21 x 10 MG tablet pack; Follow package directions. To be followed with prescription for 10 mg BID  Dispense: 1 each; Refill: 0      For neuropathy will refill gabapentin 900 mg TID. To see a Neurologist at [place] for further evaluation.    Will retry namenda for memory. Reviewed potential S/Es.    For stroke :  Continue asa 81 mg daily  Blood pressure control to <130/80  Goal LDL <70-recommend high dose statins-Cont atorvastatin 40 mg   Serum glucose < 140  Call 911 for stroke any stroke symptoms     Will continue to follow regularly for above complaints. Follow-up in 6 mo or sooner if needed. Repeat MoCA next visit.    Total time:30 min          Dictated utilizing Dragon dictation.

## 2025-02-11 ENCOUNTER — TELEPHONE (OUTPATIENT)
Dept: NEUROLOGY | Facility: CLINIC | Age: 85
End: 2025-02-11
Payer: MEDICARE

## 2025-02-12 ENCOUNTER — OFFICE VISIT (OUTPATIENT)
Dept: NEUROLOGY | Facility: CLINIC | Age: 85
End: 2025-02-12
Payer: MEDICARE

## 2025-02-12 VITALS
HEART RATE: 90 BPM | HEIGHT: 71 IN | SYSTOLIC BLOOD PRESSURE: 120 MMHG | DIASTOLIC BLOOD PRESSURE: 80 MMHG | BODY MASS INDEX: 27.86 KG/M2 | WEIGHT: 199 LBS | OXYGEN SATURATION: 98 %

## 2025-02-12 DIAGNOSIS — G60.3 IDIOPATHIC PROGRESSIVE NEUROPATHY: ICD-10-CM

## 2025-02-12 RX ORDER — GABAPENTIN 300 MG/1
900 CAPSULE ORAL 3 TIMES DAILY
Qty: 279 CAPSULE | Refills: 5 | Status: SHIPPED | OUTPATIENT
Start: 2025-02-12

## 2025-02-12 RX ORDER — MEMANTINE HYDROCHLORIDE 5 MG-10 MG
KIT ORAL
Qty: 1 EACH | Refills: 0 | Status: SHIPPED | OUTPATIENT
Start: 2025-02-12

## 2025-02-27 RX ORDER — MEMANTINE HYDROCHLORIDE 10 MG/1
10 TABLET ORAL 2 TIMES DAILY
Qty: 60 TABLET | Refills: 5 | Status: SHIPPED | OUTPATIENT
Start: 2025-02-27 | End: 2025-08-26

## 2025-03-10 DIAGNOSIS — Z86.73 HISTORY OF STROKE: ICD-10-CM

## 2025-03-10 RX ORDER — ATORVASTATIN CALCIUM 40 MG/1
40 TABLET, FILM COATED ORAL DAILY
Qty: 90 TABLET | Refills: 2 | Status: SHIPPED | OUTPATIENT
Start: 2025-03-10

## 2025-03-10 NOTE — TELEPHONE ENCOUNTER
Rx Refill Note  Requested Prescriptions     Pending Prescriptions Disp Refills    atorvastatin (LIPITOR) 40 MG tablet [Pharmacy Med Name: ATORVASTATIN 40 MG TABLET] 90 tablet 2     Sig: TAKE 1 TABLET BY MOUTH DAILY      Last office visit with prescribing clinician: 8/19/2024   Last telemedicine visit with prescribing clinician: Visit date not found   Next office visit with prescribing clinician: 4/7/2025                         Would you like a call back once the refill request has been completed: [] Yes [] No    If the office needs to give you a call back, can they leave a voicemail: [] Yes [] No    Jeannie Coburn MA  03/10/25, 10:17 EDT

## 2025-04-07 ENCOUNTER — OFFICE VISIT (OUTPATIENT)
Dept: FAMILY MEDICINE CLINIC | Facility: CLINIC | Age: 85
End: 2025-04-07
Payer: MEDICARE

## 2025-04-07 VITALS
SYSTOLIC BLOOD PRESSURE: 110 MMHG | HEART RATE: 102 BPM | WEIGHT: 205 LBS | TEMPERATURE: 97.3 F | BODY MASS INDEX: 28.7 KG/M2 | HEIGHT: 71 IN | DIASTOLIC BLOOD PRESSURE: 68 MMHG | OXYGEN SATURATION: 97 %

## 2025-04-07 DIAGNOSIS — Z00.00 MEDICARE ANNUAL WELLNESS VISIT, SUBSEQUENT: Primary | ICD-10-CM

## 2025-04-07 DIAGNOSIS — Z13.0 SCREENING FOR DEFICIENCY ANEMIA: ICD-10-CM

## 2025-04-07 DIAGNOSIS — R73.9 ELEVATED RANDOM BLOOD GLUCOSE LEVEL: ICD-10-CM

## 2025-04-07 DIAGNOSIS — Z13.6 SCREENING FOR ISCHEMIC HEART DISEASE: ICD-10-CM

## 2025-04-07 NOTE — PROGRESS NOTES
Subjective   The ABCs of the Annual Wellness Visit  Medicare Wellness Visit      Nolan Mccoy Jr. is a 84 y.o. patient who presents for a Medicare Wellness Visit.    The following portions of the patient's history were reviewed and   updated as appropriate: allergies, current medications, past family history, past medical history, past social history, past surgical history, and problem list.    Compared to one year ago, the patient's physical   health is the same.  Compared to one year ago, the patient's mental   health is the same.    Recent Hospitalizations:  He was not admitted to the hospital during the last year.     Current Medical Providers:  Patient Care Team:  Dipika Lora MD as PCP - General (Family Medicine)  Cheng Ngo MD (Internal Medicine)    Outpatient Medications Prior to Visit   Medication Sig Dispense Refill    acetaminophen (TYLENOL) 325 MG tablet Take 2 tablets by mouth Every 6 (Six) Hours As Needed for Mild Pain.      aspirin 81 MG EC tablet Take 1 tablet by mouth Daily.      atorvastatin (LIPITOR) 40 MG tablet TAKE 1 TABLET BY MOUTH DAILY 90 tablet 2    B Complex Vitamins (VITAMIN B COMPLEX PO) Take  by mouth.      Ca Phosphate-Cholecalciferol (CALCIUM/VITAMIN D3 GUMMIES PO) Take  by mouth.      calcium carbonate (TUMS) 500 MG chewable tablet Chew 1 tablet Every 4 (Four) Hours As Needed for Indigestion or Heartburn.      dutasteride (AVODART) 0.5 MG capsule Take 1 capsule by mouth Daily.      gabapentin (NEURONTIN) 300 MG capsule Take 3 capsules by mouth 3 (Three) Times a Day. 279 capsule 5    memantine (NAMENDA) 10 MG tablet Take 1 tablet by mouth 2 (Two) Times a Day for 180 days. Start after titration pack completed. 60 tablet 5    Multiple Vitamins-Minerals (MULTIVITAMIN WITH MINERALS) tablet Take 1 tablet by mouth Daily.      tamsulosin (FLOMAX) 0.4 MG capsule 24 hr capsule Take 1 capsule by mouth Daily.       No facility-administered medications prior to visit.     No  "opioid medication identified on active medication list. I have reviewed chart for other potential  high risk medication/s and harmful drug interactions in the elderly.      Aspirin is on active medication list. Aspirin use is indicated based on review of current medical condition/s. Pros and cons of this therapy have been discussed today. Benefits of this medication outweigh potential harm.  Patient has been encouraged to continue taking this medication.  .      Patient Active Problem List   Diagnosis    BPH (benign prostatic hyperplasia)    Hypertension    Neuropathy    Screen for colon cancer    Right bundle branch block (RBBB)    GERD (gastroesophageal reflux disease)    Spinal stenosis of lumbar region with neurogenic claudication    Postoperative hypotension    Postoperative anemia due to acute blood loss    Thrombocytopenia due to blood loss    Atrial tachycardia    Thoracic compression fracture, closed, initial encounter    Chronic low back pain with sciatica    IFG (impaired fasting glucose)    Lumbar radiculopathy, chronic    Prediabetes    Other hyperlipidemia    S/P lumbar fusion    Varicose veins of leg with pain, right    Vitamin D deficiency    History of stroke    MCI (mild cognitive impairment)    Medicare annual wellness visit, subsequent     Advance Care Planning Advance Directive is on file.  ACP discussion was held with the patient during this visit. Patient has an advance directive in EMR which is still valid.             Objective   Vitals:    04/07/25 1405   BP: 110/68   Pulse: 102   Temp: 97.3 °F (36.3 °C)   SpO2: 97%   Weight: 93 kg (205 lb)   Height: 180.3 cm (71\")   PainSc: 4        Estimated body mass index is 28.59 kg/m² as calculated from the following:    Height as of this encounter: 180.3 cm (71\").    Weight as of this encounter: 93 kg (205 lb).                Does the patient have evidence of cognitive impairment? Yes                                                                    "                            Health  Risk Assessment    Smoking Status:  Social History     Tobacco Use   Smoking Status Never    Passive exposure: Never   Smokeless Tobacco Never   Tobacco Comments    chew on cigars but do not light     Alcohol Consumption:  Social History     Substance and Sexual Activity   Alcohol Use Yes    Alcohol/week: 3.0 standard drinks of alcohol    Types: 3 Cans of beer per week    Comment: 1 drink every other day occasionally       Fall Risk Screen  TRAVIS Fall Risk Assessment was completed, and patient is at MODERATE risk for falls. Assessment completed on:2025    Depression Screening   Little interest or pleasure in doing things? Not at all   Feeling down, depressed, or hopeless? Not at all   PHQ-2 Total Score 0      Health Habits and Functional and Cognitive Screenin/7/2025     2:16 PM   Functional & Cognitive Status   Do you have difficulty preparing food and eating? No   Do you have difficulty bathing yourself, getting dressed or grooming yourself? No   Do you have difficulty using the toilet? No   Do you have difficulty moving around from place to place? Yes   Do you have trouble with steps or getting out of a bed or a chair? Yes   Current Diet Well Balanced Diet   Dental Exam Up to date   Eye Exam Up to date   Exercise (times per week) 0 times per week   Current Exercises Include No Regular Exercise   Do you need help using the phone?  No   Are you deaf or do you have serious difficulty hearing?  Yes   Do you need help to go to places out of walking distance? Yes   Do you need help shopping? No   Do you need help preparing meals?  No   Do you need help with housework?  No   Do you need help with laundry? No   Do you need help taking your medications? No   Do you need help managing money? No   Do you ever drive or ride in a car without wearing a seat belt? No   Have you felt unusual stress, anger or loneliness in the last month? No   Who do you live with? Spouse   If you  need help, do you have trouble finding someone available to you? No   Have you been bothered in the last four weeks by sexual problems? No   Do you have difficulty concentrating, remembering or making decisions? No           Age-appropriate Screening Schedule:  Refer to the list below for future screening recommendations based on patient's age, sex and/or medical conditions. Orders for these recommended tests are listed in the plan section. The patient has been provided with a written plan.    Health Maintenance List  Health Maintenance   Topic Date Due    TDAP/TD VACCINES (1 - Tdap) Never done    Pneumococcal Vaccine 50+ (1 of 1 - PCV) Never done    RSV Vaccine - Adults (1 - 1-dose 75+ series) Never done    ZOSTER VACCINE (2 of 2) 01/07/2025    LIPID PANEL  04/04/2025    COVID-19 Vaccine (7 - 2024-25 season) 04/23/2025    INFLUENZA VACCINE  07/01/2025    ANNUAL WELLNESS VISIT  08/19/2025    COLORECTAL CANCER SCREENING  07/22/2034                                                                                                                                                CMS Preventative Services Quick Reference  Risk Factors Identified During Encounter  Chronic Pain: Natural history and expected course discussed. Questions answered.  Neuropathy treated with gabapentin.  Fall Risk-High or Moderate: Discussed Fall Prevention in the home  Hearing Problem:  stable, hearing aid use encouraged.  Immunizations Discussed/Encouraged: Influenza, Shingrix, COVID19, and RSV (Respiratory Syncytial Virus)  Dental Screening Recommended  Vision Screening Recommended    The above risks/problems have been discussed with the patient.  Pertinent information has been shared with the patient in the After Visit Summary.  An After Visit Summary and PPPS were made available to the patient.    Follow Up:   Next Medicare Wellness visit to be scheduled in 1 year.     Assessment & Plan  Medicare annual wellness visit, subsequent  Patient was  counseled in regards to maintaining a healthy lifestyle, rich in whole grains, fruits and vegetables. Limit high saturated fats and processed sugars. Maintain an active lifestyle to promote overall health and well being.            Screening for deficiency anemia    Orders:    CBC Auto Differential    Screening for ischemic heart disease    Orders:    Comprehensive Metabolic Panel    Lipid Panel    Elevated random blood glucose level    Orders:    ORDER: Hemoglobin A1c         Follow Up:   No follow-ups on file.

## 2025-04-08 LAB
ALBUMIN SERPL-MCNC: 4.1 G/DL (ref 3.5–5.2)
ALBUMIN/GLOB SERPL: 1.6 G/DL
ALP SERPL-CCNC: 80 U/L (ref 39–117)
ALT SERPL-CCNC: 15 U/L (ref 1–41)
AST SERPL-CCNC: 19 U/L (ref 1–40)
BASOPHILS # BLD AUTO: 0.05 10*3/MM3 (ref 0–0.2)
BASOPHILS NFR BLD AUTO: 0.8 % (ref 0–1.5)
BILIRUB SERPL-MCNC: 0.6 MG/DL (ref 0–1.2)
BUN SERPL-MCNC: 14 MG/DL (ref 8–23)
BUN/CREAT SERPL: 12.3 (ref 7–25)
CALCIUM SERPL-MCNC: 9.4 MG/DL (ref 8.6–10.5)
CHLORIDE SERPL-SCNC: 104 MMOL/L (ref 98–107)
CHOLEST SERPL-MCNC: 104 MG/DL (ref 0–200)
CO2 SERPL-SCNC: 26.2 MMOL/L (ref 22–29)
CREAT SERPL-MCNC: 1.14 MG/DL (ref 0.76–1.27)
EGFRCR SERPLBLD CKD-EPI 2021: 63.8 ML/MIN/1.73
EOSINOPHIL # BLD AUTO: 0.08 10*3/MM3 (ref 0–0.4)
EOSINOPHIL NFR BLD AUTO: 1.3 % (ref 0.3–6.2)
ERYTHROCYTE [DISTWIDTH] IN BLOOD BY AUTOMATED COUNT: 14.2 % (ref 12.3–15.4)
GLOBULIN SER CALC-MCNC: 2.6 GM/DL
GLUCOSE SERPL-MCNC: 102 MG/DL (ref 65–99)
HBA1C MFR BLD: 5.9 % (ref 4.8–5.6)
HCT VFR BLD AUTO: 44.1 % (ref 37.5–51)
HDLC SERPL-MCNC: 40 MG/DL (ref 40–60)
HGB BLD-MCNC: 15.5 G/DL (ref 13–17.7)
IMM GRANULOCYTES # BLD AUTO: 0.01 10*3/MM3 (ref 0–0.05)
IMM GRANULOCYTES NFR BLD AUTO: 0.2 % (ref 0–0.5)
LDLC SERPL CALC-MCNC: 46 MG/DL (ref 0–100)
LYMPHOCYTES # BLD AUTO: 0.74 10*3/MM3 (ref 0.7–3.1)
LYMPHOCYTES NFR BLD AUTO: 12 % (ref 19.6–45.3)
MCH RBC QN AUTO: 33.4 PG (ref 26.6–33)
MCHC RBC AUTO-ENTMCNC: 35.1 G/DL (ref 31.5–35.7)
MCV RBC AUTO: 95 FL (ref 79–97)
MONOCYTES # BLD AUTO: 0.41 10*3/MM3 (ref 0.1–0.9)
MONOCYTES NFR BLD AUTO: 6.6 % (ref 5–12)
NEUTROPHILS # BLD AUTO: 4.9 10*3/MM3 (ref 1.7–7)
NEUTROPHILS NFR BLD AUTO: 79.1 % (ref 42.7–76)
PLATELET # BLD AUTO: 142 10*3/MM3 (ref 140–450)
POTASSIUM SERPL-SCNC: 4.7 MMOL/L (ref 3.5–5.2)
PROT SERPL-MCNC: 6.7 G/DL (ref 6–8.5)
RBC # BLD AUTO: 4.64 10*6/MM3 (ref 4.14–5.8)
SODIUM SERPL-SCNC: 139 MMOL/L (ref 136–145)
TRIGL SERPL-MCNC: 95 MG/DL (ref 0–150)
VLDLC SERPL CALC-MCNC: 18 MG/DL (ref 5–40)
WBC # BLD AUTO: 6.19 10*3/MM3 (ref 3.4–10.8)

## 2025-07-11 ENCOUNTER — TELEPHONE (OUTPATIENT)
Dept: NEUROLOGY | Facility: CLINIC | Age: 85
End: 2025-07-11
Payer: MEDICARE

## 2025-07-11 NOTE — TELEPHONE ENCOUNTER
LVM to Pt about new r/s appt  on 9/25 with tori fowler due to provider being out of office.     SENT [employer] Message     SENT letter

## 2025-08-20 DIAGNOSIS — G60.3 IDIOPATHIC PROGRESSIVE NEUROPATHY: ICD-10-CM

## 2025-08-20 RX ORDER — GABAPENTIN 300 MG/1
900 CAPSULE ORAL 3 TIMES DAILY
Qty: 270 CAPSULE | Refills: 1 | Status: SHIPPED | OUTPATIENT
Start: 2025-08-20

## (undated) DEVICE — SUT VIC 1 OS8 27IN J699H

## (undated) DEVICE — 1 ML TUBERCULIN SYRINGE REGULAR TIP: Brand: MONOJECT

## (undated) DEVICE — TOTAL TRAY, 16FR 10ML SIL FOLEY, URN: Brand: MEDLINE

## (undated) DEVICE — BONE MARROW ASPIRATION NEEDLES ASPIRATOR KIT 3-HOLE 4 INCHES NDLE

## (undated) DEVICE — SUT MNCRYL PLS ANTIB UD 4/0 PS2 18IN

## (undated) DEVICE — TUBING, SUCTION, 1/4" X 20', STRAIGHT: Brand: MEDLINE INDUSTRIES, INC.

## (undated) DEVICE — DISPOSABLE IRRIGATION BIPOLAR CORD, M1000 TYPE: Brand: KIRWAN

## (undated) DEVICE — PK NEURO SPINE 40

## (undated) DEVICE — GLV SURG BIOGEL LTX PF 7 1/2

## (undated) DEVICE — HANDPIECE SET WITH COAXIAL HIGH FLOW TIP AND SUCTION TUBE: Brand: INTERPULSE

## (undated) DEVICE — Device

## (undated) DEVICE — 3.0MM NEURO (MATCH HEAD) LESS AGGRESSIVE

## (undated) DEVICE — 6.0MM PRECISION ROUND

## (undated) DEVICE — GLV SURG SENSICARE W/ALOE PF LF 7.5 STRL

## (undated) DEVICE — SPNG GZ WOVN 4X4IN 12PLY 10/BX STRL

## (undated) DEVICE — OCCLUSIVE GAUZE STRIP OVERWRAP,3% BISMUTH TRIBROMOPHENATE IN PETROLATUM BLEND: Brand: XEROFORM

## (undated) DEVICE — SPONGE,LAP,12"X12",XR,ST,5/PK,40PK/CS: Brand: MEDLINE

## (undated) DEVICE — NDL SPINE 20G 3 1/2 YEL STRL 1P/U

## (undated) DEVICE — ANTIBACTERIAL UNDYED BRAIDED (POLYGLACTIN 910), SYNTHETIC ABSORBABLE SUTURE: Brand: COATED VICRYL

## (undated) DEVICE — DRP MICROSCOPE 4 BINOCULAR CV 54X150IN

## (undated) DEVICE — SUT VIC 0 CT1 CR8 27IN JJ41G

## (undated) DEVICE — PAD,ABDOMINAL,8"X10",ST,LF: Brand: MEDLINE

## (undated) DEVICE — GOWN,ECLIPSE,FABRIC-REINFORCED,X-LARGE: Brand: MEDLINE

## (undated) DEVICE — THE MILL DISPOSABLE - MEDIUM

## (undated) DEVICE — CODMAN® SURGICAL PATTIES 3/4" X 3/4" (1.91CM X 1.91CM): Brand: CODMAN®

## (undated) DEVICE — SOL NACL 0.9PCT 1000ML

## (undated) DEVICE — APPL CHLORAPREP W/TINT 26ML ORNG

## (undated) DEVICE — ADHS SKIN DERMABOND TOP ADVANCED

## (undated) DEVICE — DRSNG GZ PETROLTM XEROFORM CURAD 1X8IN STRL

## (undated) DEVICE — APPL DURAPREP IODOPHOR APL 26ML

## (undated) DEVICE — CONN TBG Y 5 IN 1 LF STRL

## (undated) DEVICE — MEDI-VAC YANKAUER SUCTION HANDLE W/BULBOUS TIP: Brand: CARDINAL HEALTH

## (undated) DEVICE — CODMAN® SURGICAL PATTIES 1/2" X 3" (1.27CM X 7.62CM): Brand: CODMAN®

## (undated) DEVICE — GLV SURG PREMIERPRO ORTHO LTX PF SZ8 BRN

## (undated) DEVICE — SMOKE EVACUATION TUBING WITH 7/8 IN TO 1/4 IN REDUCER: Brand: BUFFALO FILTER

## (undated) DEVICE — GLV SURG BIOGEL LTX PF 7